# Patient Record
Sex: FEMALE | Race: WHITE | NOT HISPANIC OR LATINO
[De-identification: names, ages, dates, MRNs, and addresses within clinical notes are randomized per-mention and may not be internally consistent; named-entity substitution may affect disease eponyms.]

---

## 2018-02-27 ENCOUNTER — APPOINTMENT (OUTPATIENT)
Dept: MRI IMAGING | Facility: HOSPITAL | Age: 83
End: 2018-02-27
Payer: MEDICARE

## 2018-02-27 ENCOUNTER — OUTPATIENT (OUTPATIENT)
Dept: OUTPATIENT SERVICES | Facility: HOSPITAL | Age: 83
LOS: 1 days | End: 2018-02-27
Payer: MEDICARE

## 2018-02-27 DIAGNOSIS — Z00.8 ENCOUNTER FOR OTHER GENERAL EXAMINATION: ICD-10-CM

## 2018-02-27 DIAGNOSIS — Z98.89 OTHER SPECIFIED POSTPROCEDURAL STATES: Chronic | ICD-10-CM

## 2018-02-27 DIAGNOSIS — M24.842: ICD-10-CM

## 2018-02-27 PROCEDURE — 73218 MRI UPPER EXTREMITY W/O DYE: CPT | Mod: 26,LT

## 2018-02-27 PROCEDURE — 73218 MRI UPPER EXTREMITY W/O DYE: CPT

## 2018-03-08 ENCOUNTER — OUTPATIENT (OUTPATIENT)
Dept: OUTPATIENT SERVICES | Facility: HOSPITAL | Age: 83
LOS: 1 days | End: 2018-03-08
Payer: MEDICARE

## 2018-03-08 DIAGNOSIS — S66.822A LACERATION OF OTHER SPECIFIED MUSCLES, FASCIA AND TENDONS AT WRIST AND HAND LEVEL, LEFT HAND, INITIAL ENCOUNTER: ICD-10-CM

## 2018-03-08 DIAGNOSIS — Z01.818 ENCOUNTER FOR OTHER PREPROCEDURAL EXAMINATION: ICD-10-CM

## 2018-03-08 DIAGNOSIS — I10 ESSENTIAL (PRIMARY) HYPERTENSION: ICD-10-CM

## 2018-03-08 DIAGNOSIS — Z98.89 OTHER SPECIFIED POSTPROCEDURAL STATES: Chronic | ICD-10-CM

## 2018-03-08 PROCEDURE — 93010 ELECTROCARDIOGRAM REPORT: CPT | Mod: NC

## 2018-03-08 PROCEDURE — 85027 COMPLETE CBC AUTOMATED: CPT

## 2018-03-08 PROCEDURE — G0463: CPT

## 2018-03-08 PROCEDURE — 36415 COLL VENOUS BLD VENIPUNCTURE: CPT

## 2018-03-08 PROCEDURE — 93005 ELECTROCARDIOGRAM TRACING: CPT

## 2018-03-08 PROCEDURE — 80048 BASIC METABOLIC PNL TOTAL CA: CPT

## 2018-03-12 ENCOUNTER — APPOINTMENT (OUTPATIENT)
Dept: ORTHOPEDIC SURGERY | Facility: CLINIC | Age: 83
End: 2018-03-12

## 2018-03-13 ENCOUNTER — OUTPATIENT (OUTPATIENT)
Dept: OUTPATIENT SERVICES | Facility: HOSPITAL | Age: 83
LOS: 1 days | End: 2018-03-13
Payer: MEDICARE

## 2018-03-13 DIAGNOSIS — M17.0 BILATERAL PRIMARY OSTEOARTHRITIS OF KNEE: ICD-10-CM

## 2018-03-13 DIAGNOSIS — Z98.89 OTHER SPECIFIED POSTPROCEDURAL STATES: Chronic | ICD-10-CM

## 2018-03-13 DIAGNOSIS — H35.30 UNSPECIFIED MACULAR DEGENERATION: ICD-10-CM

## 2018-03-13 DIAGNOSIS — E03.9 HYPOTHYROIDISM, UNSPECIFIED: ICD-10-CM

## 2018-03-13 DIAGNOSIS — S66.222A LACERATION OF EXTENSOR MUSCLE, FASCIA AND TENDON OF LEFT THUMB AT WRIST AND HAND LEVEL, INITIAL ENCOUNTER: ICD-10-CM
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
## 2018-03-13 DIAGNOSIS — E78.00 PURE HYPERCHOLESTEROLEMIA, UNSPECIFIED: ICD-10-CM

## 2018-03-13 DIAGNOSIS — S66.822A LACERATION OF OTHER SPECIFIED MUSCLES, FASCIA AND TENDONS AT WRIST AND HAND LEVEL, LEFT HAND, INITIAL ENCOUNTER: ICD-10-CM

## 2018-03-13 DIAGNOSIS — I10 ESSENTIAL (PRIMARY) HYPERTENSION: ICD-10-CM
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
## 2018-03-14 ENCOUNTER — TRANSCRIPTION ENCOUNTER (OUTPATIENT)
Age: 83
End: 2018-03-14

## 2018-03-14 PROCEDURE — C1713: CPT

## 2018-03-14 PROCEDURE — 26418 REPAIR FINGER TENDON: CPT | Mod: FA

## 2018-08-16 ENCOUNTER — INPATIENT (INPATIENT)
Facility: HOSPITAL | Age: 83
LOS: 0 days | Discharge: HOME | DRG: 313 | End: 2018-08-17
Attending: INTERNAL MEDICINE | Admitting: INTERNAL MEDICINE
Payer: COMMERCIAL

## 2018-08-16 VITALS
RESPIRATION RATE: 18 BRPM | WEIGHT: 129.19 LBS | DIASTOLIC BLOOD PRESSURE: 79 MMHG | SYSTOLIC BLOOD PRESSURE: 139 MMHG | TEMPERATURE: 98 F | OXYGEN SATURATION: 94 % | HEART RATE: 70 BPM | HEIGHT: 63 IN

## 2018-08-16 DIAGNOSIS — R07.9 CHEST PAIN, UNSPECIFIED: ICD-10-CM

## 2018-08-16 DIAGNOSIS — Z98.89 OTHER SPECIFIED POSTPROCEDURAL STATES: Chronic | ICD-10-CM

## 2018-08-16 DIAGNOSIS — I10 ESSENTIAL (PRIMARY) HYPERTENSION: ICD-10-CM

## 2018-08-16 DIAGNOSIS — E78.5 HYPERLIPIDEMIA, UNSPECIFIED: ICD-10-CM

## 2018-08-16 DIAGNOSIS — Z29.9 ENCOUNTER FOR PROPHYLACTIC MEASURES, UNSPECIFIED: ICD-10-CM

## 2018-08-16 LAB
ALBUMIN SERPL ELPH-MCNC: 4.2 G/DL — SIGNIFICANT CHANGE UP (ref 3.3–5)
ALP SERPL-CCNC: 88 U/L — SIGNIFICANT CHANGE UP (ref 40–120)
ALT FLD-CCNC: 29 U/L DA — SIGNIFICANT CHANGE UP (ref 10–45)
ANION GAP SERPL CALC-SCNC: 11 MMOL/L — SIGNIFICANT CHANGE UP (ref 5–17)
AST SERPL-CCNC: 23 U/L — SIGNIFICANT CHANGE UP (ref 10–40)
BASOPHILS # BLD AUTO: 0 K/UL — SIGNIFICANT CHANGE UP (ref 0–0.2)
BASOPHILS NFR BLD AUTO: 0.6 % — SIGNIFICANT CHANGE UP (ref 0–2)
BILIRUB SERPL-MCNC: 0.9 MG/DL — SIGNIFICANT CHANGE UP (ref 0.2–1.2)
BUN SERPL-MCNC: 18 MG/DL — SIGNIFICANT CHANGE UP (ref 7–23)
CALCIUM SERPL-MCNC: 9.8 MG/DL — SIGNIFICANT CHANGE UP (ref 8.4–10.5)
CHLORIDE SERPL-SCNC: 105 MMOL/L — SIGNIFICANT CHANGE UP (ref 96–108)
CK SERPL-CCNC: 71 U/L — SIGNIFICANT CHANGE UP (ref 25–170)
CO2 SERPL-SCNC: 24 MMOL/L — SIGNIFICANT CHANGE UP (ref 22–31)
CREAT SERPL-MCNC: 0.72 MG/DL — SIGNIFICANT CHANGE UP (ref 0.5–1.3)
EOSINOPHIL # BLD AUTO: 0 K/UL — SIGNIFICANT CHANGE UP (ref 0–0.5)
EOSINOPHIL NFR BLD AUTO: 0.3 % — SIGNIFICANT CHANGE UP (ref 0–6)
GLUCOSE SERPL-MCNC: 104 MG/DL — HIGH (ref 70–99)
HCT VFR BLD CALC: 40 % — SIGNIFICANT CHANGE UP (ref 34.5–45)
HGB BLD-MCNC: 14.2 G/DL — SIGNIFICANT CHANGE UP (ref 11.5–15.5)
LYMPHOCYTES # BLD AUTO: 1.3 K/UL — SIGNIFICANT CHANGE UP (ref 1–3.3)
LYMPHOCYTES # BLD AUTO: 18.9 % — SIGNIFICANT CHANGE UP (ref 13–44)
MCHC RBC-ENTMCNC: 32.9 PG — SIGNIFICANT CHANGE UP (ref 27–34)
MCHC RBC-ENTMCNC: 35.6 GM/DL — SIGNIFICANT CHANGE UP (ref 32–36)
MCV RBC AUTO: 92.4 FL — SIGNIFICANT CHANGE UP (ref 80–100)
MONOCYTES # BLD AUTO: 0.4 K/UL — SIGNIFICANT CHANGE UP (ref 0–0.9)
MONOCYTES NFR BLD AUTO: 6 % — SIGNIFICANT CHANGE UP (ref 2–14)
NEUTROPHILS # BLD AUTO: 4.9 K/UL — SIGNIFICANT CHANGE UP (ref 1.8–7.4)
NEUTROPHILS NFR BLD AUTO: 74.1 % — SIGNIFICANT CHANGE UP (ref 43–77)
NT-PROBNP SERPL-SCNC: 287 PG/ML — SIGNIFICANT CHANGE UP (ref 0–300)
PLATELET # BLD AUTO: 203 K/UL — SIGNIFICANT CHANGE UP (ref 150–400)
POTASSIUM SERPL-MCNC: 3.8 MMOL/L — SIGNIFICANT CHANGE UP (ref 3.5–5.3)
POTASSIUM SERPL-SCNC: 3.8 MMOL/L — SIGNIFICANT CHANGE UP (ref 3.5–5.3)
PROT SERPL-MCNC: 7.5 G/DL — SIGNIFICANT CHANGE UP (ref 6–8.3)
RBC # BLD: 4.33 M/UL — SIGNIFICANT CHANGE UP (ref 3.8–5.2)
RBC # FLD: 11.7 % — SIGNIFICANT CHANGE UP (ref 10.3–14.5)
SODIUM SERPL-SCNC: 140 MMOL/L — SIGNIFICANT CHANGE UP (ref 135–145)
TROPONIN I SERPL-MCNC: <.017 NG/ML — LOW (ref 0.02–0.06)
WBC # BLD: 6.6 K/UL — SIGNIFICANT CHANGE UP (ref 3.8–10.5)
WBC # FLD AUTO: 6.6 K/UL — SIGNIFICANT CHANGE UP (ref 3.8–10.5)

## 2018-08-16 PROCEDURE — 71045 X-RAY EXAM CHEST 1 VIEW: CPT | Mod: 26

## 2018-08-16 PROCEDURE — 93010 ELECTROCARDIOGRAM REPORT: CPT

## 2018-08-16 PROCEDURE — 99222 1ST HOSP IP/OBS MODERATE 55: CPT

## 2018-08-16 PROCEDURE — 99285 EMERGENCY DEPT VISIT HI MDM: CPT

## 2018-08-16 PROCEDURE — 99223 1ST HOSP IP/OBS HIGH 75: CPT

## 2018-08-16 RX ORDER — LEVOTHYROXINE SODIUM 125 MCG
50 TABLET ORAL DAILY
Qty: 0 | Refills: 0 | Status: DISCONTINUED | OUTPATIENT
Start: 2018-08-16 | End: 2018-08-17

## 2018-08-16 RX ORDER — NITROGLYCERIN 6.5 MG
1 CAPSULE, EXTENDED RELEASE ORAL ONCE
Qty: 0 | Refills: 0 | Status: COMPLETED | OUTPATIENT
Start: 2018-08-16 | End: 2018-08-16

## 2018-08-16 RX ORDER — ATORVASTATIN CALCIUM 80 MG/1
20 TABLET, FILM COATED ORAL AT BEDTIME
Qty: 0 | Refills: 0 | Status: DISCONTINUED | OUTPATIENT
Start: 2018-08-16 | End: 2018-08-17

## 2018-08-16 RX ORDER — SODIUM CHLORIDE 9 MG/ML
3 INJECTION INTRAMUSCULAR; INTRAVENOUS; SUBCUTANEOUS ONCE
Qty: 0 | Refills: 0 | Status: COMPLETED | OUTPATIENT
Start: 2018-08-16 | End: 2018-08-16

## 2018-08-16 RX ORDER — REGADENOSON 0.08 MG/ML
0.4 INJECTION, SOLUTION INTRAVENOUS ONCE
Qty: 0 | Refills: 0 | Status: COMPLETED | OUTPATIENT
Start: 2018-08-16 | End: 2018-08-17

## 2018-08-16 RX ORDER — TELMISARTAN 20 MG/1
1 TABLET ORAL
Qty: 0 | Refills: 0 | COMMUNITY

## 2018-08-16 RX ORDER — SIMVASTATIN 20 MG/1
1 TABLET, FILM COATED ORAL
Qty: 0 | Refills: 0 | COMMUNITY

## 2018-08-16 RX ORDER — ASPIRIN/CALCIUM CARB/MAGNESIUM 324 MG
81 TABLET ORAL DAILY
Qty: 0 | Refills: 0 | Status: DISCONTINUED | OUTPATIENT
Start: 2018-08-16 | End: 2018-08-17

## 2018-08-16 RX ORDER — LEVOTHYROXINE SODIUM 125 MCG
40 TABLET ORAL
Qty: 0 | Refills: 0 | COMMUNITY

## 2018-08-16 RX ORDER — LOSARTAN POTASSIUM 100 MG/1
50 TABLET, FILM COATED ORAL DAILY
Qty: 0 | Refills: 0 | Status: DISCONTINUED | OUTPATIENT
Start: 2018-08-16 | End: 2018-08-17

## 2018-08-16 RX ORDER — ASPIRIN/CALCIUM CARB/MAGNESIUM 324 MG
325 TABLET ORAL ONCE
Qty: 0 | Refills: 0 | Status: COMPLETED | OUTPATIENT
Start: 2018-08-16 | End: 2018-08-16

## 2018-08-16 RX ADMIN — LOSARTAN POTASSIUM 50 MILLIGRAM(S): 100 TABLET, FILM COATED ORAL at 21:56

## 2018-08-16 RX ADMIN — Medication 1 INCH(S): at 12:27

## 2018-08-16 RX ADMIN — ATORVASTATIN CALCIUM 20 MILLIGRAM(S): 80 TABLET, FILM COATED ORAL at 21:55

## 2018-08-16 RX ADMIN — SODIUM CHLORIDE 3 MILLILITER(S): 9 INJECTION INTRAMUSCULAR; INTRAVENOUS; SUBCUTANEOUS at 11:50

## 2018-08-16 NOTE — ED ADULT NURSE NOTE - CHPI ED NUR SYMPTOMS NEG
no congestion/no chills/no nausea/no shortness of breath/no dizziness/no fever/no syncope/no vomiting/no diaphoresis/no back pain

## 2018-08-16 NOTE — ED ADULT NURSE REASSESSMENT NOTE - NS ED NURSE REASSESS COMMENT FT1
Pt placed in hospital bed, trop sent to lab, pending bed assignment. Pt made aware of plan of care. denies any concerns at this time, pt made comfortable, will continue to monitor.

## 2018-08-16 NOTE — ED PROVIDER NOTE - PMH
Acromegaly    Dyslipidemia    Hypertension    Parotid mass  left. s/p biopsy ("inconclusive) instructed to have repeat scan done in March 2015.being monitored by Dr. Sung Oscar (ENT)  Pituitary macroadenoma Attending Only

## 2018-08-16 NOTE — ED ADULT NURSE NOTE - OBJECTIVE STATEMENT
The patient is a 84y Female complaining of chest discomfort since yesterday. Pt stated it is not pain just discomfort. C/o nausea Denies any vomiting, HA, dizziness, fever, or any other concerns at this time.

## 2018-08-16 NOTE — H&P ADULT - NSHPREVIEWOFSYSTEMS_GEN_ALL_CORE
REVIEW OF SYSTEMS:  CONSTITUTIONAL: No fever, weight loss, or fatigue  RESPIRATORY: No cough, wheezing, chills or hemoptysis; No shortness of breath  CARDIOVASCULAR: + chest pain, No palpitations, + dizziness  GASTROINTESTINAL: +abdominal pain, +nausea, No vomiting, or hematemesis; No diarrhea or constipation  GENITOURINARY: No dysuria, frequency, hematuria, or incontinence  NEUROLOGICAL: No headaches, memory loss, loss of strength, numbness, or tremors  SKIN: No itching, burning, rashes, or lesions   MUSCULOSKELETAL: No joint pain or swelling; No muscle, back, or extremity pain          All other ROS reviewed and negative except as otherwise stated

## 2018-08-16 NOTE — CONSULT NOTE ADULT - ASSESSMENT
In summary the pt is an elderly woman with 2 known risk factors for cad with atypical prolonged chest pain    suggest:  serial troponin  serial ekg    if no further symptoms vmpi in am

## 2018-08-16 NOTE — H&P ADULT - PROBLEM SELECTOR PLAN 4
IMPROVE VTE Individual Risk Assessment    RISK                                                                Points    [  ] Previous VTE                                                  3    [  ] Thrombophilia                                               2    [  ] Lower limb paralysis                                      2        (unable to hold up >15 seconds)      [  ] Current Cancer                                              2         (within 6 months)  [  ] Immobilization   [  ] ICU/CCU stay > 24 hours                              1  [X  ] Age > 60                                                      1  IMPROVE VTE Score _________

## 2018-08-16 NOTE — H&P ADULT - NSHPLABSRESULTS_GEN_ALL_CORE
14.2   6.6   )-----------( 203      ( 16 Aug 2018 11:55 )             40.0       08-16    140  |  105  |  18  ----------------------------<  104<H>  3.8   |  24  |  0.72    Ca    9.8      16 Aug 2018 11:55    TPro  7.5  /  Alb  4.2  /  TBili  0.9  /  DBili  x   /  AST  23  /  ALT  29  /  AlkPhos  88  08-16          CARDIAC MARKERS ( 16 Aug 2018 11:55 )  <.017 ng/mL / x     / 71 U/L / x     / x          < from: Xray Chest 1 View AP/PA (08.16.18 @ 12:06) >    IMPRESSION: No acute lung finding. Other findings stable as above.    < end of copied text >    EKG- reviewed nsr

## 2018-08-16 NOTE — ED PROVIDER NOTE - NS ED ROS FT
Constitutional: (-) fever  (-)chills  (-)sweats  Eyes/ENT: (-) blurry vision, (-) epistaxis  (-)rhinorrhea   (-) sore throat    Cardiovascular: (+) chest pain, (-) palpitations (-) edema   Respiratory: (-) cough, (-) shortness of breath   Gastrointestinal: (-)nausea  (-)vomiting, (-) diarrhea  (-) abdominal pain   :  (-)dysuria, (-)frequency, (-)urgency, (-)hematuria  Musculoskeletal: (-) neck pain, (-) back pain, (-) joint pain  Integumentary: (-) rash, (-) edema  Neurological: (-) headache, (-) altered mental status  (-)LOC + dizziness

## 2018-08-16 NOTE — H&P ADULT - HISTORY OF PRESENT ILLNESS
85 y/o F with PMG hypothyroidism, HTN, HLD presents with chest pain associated with nausea and diaphoresis. Patient states it started yesterday while she was relaxing at home and the pain went across her chest and she felt nauseous and had abdominal pain along with it. Denies any SOB, GI symptoms, headache, fever, HA, chills, etc.

## 2018-08-16 NOTE — ED ADULT NURSE NOTE - NSIMPLEMENTINTERV_GEN_ALL_ED
Implemented All Universal Safety Interventions:  [place] to call system. Call bell, personal items and telephone within reach. Instruct patient to call for assistance. Room bathroom lighting operational. Non-slip footwear when patient is off stretcher. Physically safe environment: no spills, clutter or unnecessary equipment. Stretcher in lowest position, wheels locked, appropriate side rails in place.

## 2018-08-16 NOTE — ED ADULT NURSE NOTE - PMH
Acromegaly    Dyslipidemia    Hypertension    Parotid mass  left. s/p biopsy ("inconclusive) instructed to have repeat scan done in March 2015.being monitored by Dr. Sung Oscar (ENT)  Pituitary macroadenoma

## 2018-08-16 NOTE — H&P ADULT - FAMILY HISTORY
Sibling  Still living? Unknown  Family history of colon cancer, Age at diagnosis: Age Unknown  Family history of brain tumor, Age at diagnosis: Age Unknown  Family history of lung cancer, Age at diagnosis: Age Unknown

## 2018-08-16 NOTE — CONSULT NOTE ADULT - SUBJECTIVE AND OBJECTIVE BOX
This is an 84 yr old white woman with hx of htn and hyperlipidemia with sevral hours of squeezing left sided chest pain radiating to abdomen. Pain occurred yesterday but patient could not get anyone to drive her" until today. She had some assoc sob. No n/v but did have diaphoresis      PMH:   Acromegaly  Pituitary macroadenoma  Parotid mass  Dyslipidemia  Hypertension    PSH:   History of cervical discectomy    Family History:  FAMILY HISTORY:  Family history of lung cancer (Sibling)  Family history of brain tumor (Sibling)  Family history of colon cancer (Sibling)      Social History:  Smoking:NO  Alcohol:OCC  Drugs:NO    Allergies:  No Known Allergies      Medications:  aspirin  chewable 81 milliGRAM(s) Oral daily  atorvastatin 20 milliGRAM(s) Oral at bedtime  levothyroxine 50 MICROGram(s) Oral daily  losartan 50 milliGRAM(s) Oral daily      REVIEW OF SYSTEMS:  CONSTITUTIONAL: No fever, weight loss, or fatigue  EYES: No eye pain, visual disturbances, or discharge  ENMT:  No difficulty hearing, tinnitus, vertigo; No sinus or throat pain  NECK: No pain or stiffness  BREASTS: No pain, masses, or nipple discharge  RESPIRATORY: No cough, wheezing, chills or hemoptysis; No shortness of breath  CARDIOVASCULAR: as per hpi  GASTROINTESTINAL:as per hpi  GENITOURINARY: No dysuria, frequency, hematuria, or incontinence  NEUROLOGICAL: No headaches, memory loss, loss of strength, numbness, or tremors  SKIN: No itching, burning, rashes, or lesions   LYMPH NODES: No enlarged glands  ENDOCRINE: No heat or cold intolerance; No hair loss  MUSCULOSKELETAL: No joint pain or swelling; No muscle, back, or extremity pain  PSYCHIATRIC: No depression, anxiety, mood swings, or difficulty sleeping  HEME/LYMPH: No easy bruising, or bleeding gums  ALLERY AND IMMUNOLOGIC: No hives or eczema    Physical Exam:  T(C): 36.8 (08-16-18 @ 11:31), Max: 36.8 (08-16-18 @ 11:31)  HR: 59 (08-16-18 @ 16:05) (59 - 70)  BP: 150/73 (08-16-18 @ 16:05) (117/61 - 150/73)  RR: 17 (08-16-18 @ 16:05) (17 - 18)  SpO2: 100% (08-16-18 @ 16:05) (94% - 100%)  Wt(kg): --    GENERAL: NAD, well-groomed, well-developed  HEAD:  Atraumatic, Normocephalic  EYES: EOMI, conjunctiva and sclera clear  ENT: Moist mucous membranes,  NECK: Supple, No JVD, no bruits  CHEST/LUNG: Clear to percussion bilaterally; No rales, rhonchi, wheezing, or rubs  HEART: Regular rate and rhythm; No murmurs, rubs, or gallops PMI non displaced.  ABDOMEN: Soft, Nontender, Nondistended; Bowel sounds present  EXTREMITIES:  2+ Peripheral Pulses, No clubbing, cyanosis, or edema  SKIN: No rashes or lesions  NERVOUS SYSTEM:  Alert & Oriented X3, Good concentration; Motor Strength 5/5 B/L upper and lower extremities; DTRs 2+ intact and symmetric    Cardiovascular Diagnostic Testing:  ECG:NORMAL    Labs:                        14.2   6.6   )-----------( 203      ( 16 Aug 2018 11:55 )             40.0     08-16    140  |  105  |  18  ----------------------------<  104<H>  3.8   |  24  |  0.72    Ca    9.8      16 Aug 2018 11:55    TPro  7.5  /  Alb  4.2  /  TBili  0.9  /  DBili  x   /  AST  23  /  ALT  29  /  AlkPhos  88  08-16      CARDIAC MARKERS ( 16 Aug 2018 11:55 )  <.017 ng/mL / x     / 71 U/L / x     / x          Serum Pro-Brain Natriuretic Peptide: 287 pg/mL (08-16 @ 11:55)            Imaging:cxr normal

## 2018-08-17 ENCOUNTER — TRANSCRIPTION ENCOUNTER (OUTPATIENT)
Age: 83
End: 2018-08-17

## 2018-08-17 VITALS
TEMPERATURE: 98 F | DIASTOLIC BLOOD PRESSURE: 67 MMHG | RESPIRATION RATE: 16 BRPM | HEART RATE: 56 BPM | OXYGEN SATURATION: 98 % | SYSTOLIC BLOOD PRESSURE: 116 MMHG

## 2018-08-17 LAB
ANION GAP SERPL CALC-SCNC: 8 MMOL/L — SIGNIFICANT CHANGE UP (ref 5–17)
BUN SERPL-MCNC: 20 MG/DL — SIGNIFICANT CHANGE UP (ref 7–23)
CALCIUM SERPL-MCNC: 8.9 MG/DL — SIGNIFICANT CHANGE UP (ref 8.4–10.5)
CHLORIDE SERPL-SCNC: 109 MMOL/L — HIGH (ref 96–108)
CHOLEST SERPL-MCNC: 130 MG/DL — SIGNIFICANT CHANGE UP (ref 10–199)
CO2 SERPL-SCNC: 26 MMOL/L — SIGNIFICANT CHANGE UP (ref 22–31)
CREAT SERPL-MCNC: 0.69 MG/DL — SIGNIFICANT CHANGE UP (ref 0.5–1.3)
GLUCOSE SERPL-MCNC: 89 MG/DL — SIGNIFICANT CHANGE UP (ref 70–99)
HCT VFR BLD CALC: 36.1 % — SIGNIFICANT CHANGE UP (ref 34.5–45)
HDLC SERPL-MCNC: 54 MG/DL — SIGNIFICANT CHANGE UP
HGB BLD-MCNC: 12.5 G/DL — SIGNIFICANT CHANGE UP (ref 11.5–15.5)
LIPID PNL WITH DIRECT LDL SERPL: 61 MG/DL — SIGNIFICANT CHANGE UP
MCHC RBC-ENTMCNC: 32.5 PG — SIGNIFICANT CHANGE UP (ref 27–34)
MCHC RBC-ENTMCNC: 34.6 GM/DL — SIGNIFICANT CHANGE UP (ref 32–36)
MCV RBC AUTO: 93.9 FL — SIGNIFICANT CHANGE UP (ref 80–100)
PLATELET # BLD AUTO: 176 K/UL — SIGNIFICANT CHANGE UP (ref 150–400)
POTASSIUM SERPL-MCNC: 3.5 MMOL/L — SIGNIFICANT CHANGE UP (ref 3.5–5.3)
POTASSIUM SERPL-SCNC: 3.5 MMOL/L — SIGNIFICANT CHANGE UP (ref 3.5–5.3)
RBC # BLD: 3.84 M/UL — SIGNIFICANT CHANGE UP (ref 3.8–5.2)
RBC # FLD: 12 % — SIGNIFICANT CHANGE UP (ref 10.3–14.5)
SODIUM SERPL-SCNC: 143 MMOL/L — SIGNIFICANT CHANGE UP (ref 135–145)
TOTAL CHOLESTEROL/HDL RATIO MEASUREMENT: 2.4 RATIO — LOW (ref 3.3–7.1)
TRIGL SERPL-MCNC: 77 MG/DL — SIGNIFICANT CHANGE UP (ref 10–149)
WBC # BLD: 5.7 K/UL — SIGNIFICANT CHANGE UP (ref 3.8–10.5)
WBC # FLD AUTO: 5.7 K/UL — SIGNIFICANT CHANGE UP (ref 3.8–10.5)

## 2018-08-17 PROCEDURE — 82550 ASSAY OF CK (CPK): CPT

## 2018-08-17 PROCEDURE — 84484 ASSAY OF TROPONIN QUANT: CPT

## 2018-08-17 PROCEDURE — 80053 COMPREHEN METABOLIC PANEL: CPT

## 2018-08-17 PROCEDURE — 85027 COMPLETE CBC AUTOMATED: CPT

## 2018-08-17 PROCEDURE — 99285 EMERGENCY DEPT VISIT HI MDM: CPT | Mod: 25

## 2018-08-17 PROCEDURE — 93018 CV STRESS TEST I&R ONLY: CPT

## 2018-08-17 PROCEDURE — 80061 LIPID PANEL: CPT

## 2018-08-17 PROCEDURE — A9500: CPT

## 2018-08-17 PROCEDURE — 78452 HT MUSCLE IMAGE SPECT MULT: CPT | Mod: 26

## 2018-08-17 PROCEDURE — 93016 CV STRESS TEST SUPVJ ONLY: CPT

## 2018-08-17 PROCEDURE — 78452 HT MUSCLE IMAGE SPECT MULT: CPT

## 2018-08-17 PROCEDURE — 80048 BASIC METABOLIC PNL TOTAL CA: CPT

## 2018-08-17 PROCEDURE — 71045 X-RAY EXAM CHEST 1 VIEW: CPT

## 2018-08-17 PROCEDURE — 99232 SBSQ HOSP IP/OBS MODERATE 35: CPT | Mod: 25

## 2018-08-17 PROCEDURE — 93306 TTE W/DOPPLER COMPLETE: CPT | Mod: 26

## 2018-08-17 PROCEDURE — 93306 TTE W/DOPPLER COMPLETE: CPT

## 2018-08-17 PROCEDURE — 93005 ELECTROCARDIOGRAM TRACING: CPT

## 2018-08-17 PROCEDURE — 83880 ASSAY OF NATRIURETIC PEPTIDE: CPT

## 2018-08-17 RX ADMIN — Medication 1 INCH(S): at 00:05

## 2018-08-17 RX ADMIN — Medication 50 MICROGRAM(S): at 05:14

## 2018-08-17 RX ADMIN — Medication 81 MILLIGRAM(S): at 12:03

## 2018-08-17 RX ADMIN — LOSARTAN POTASSIUM 50 MILLIGRAM(S): 100 TABLET, FILM COATED ORAL at 05:14

## 2018-08-17 RX ADMIN — REGADENOSON 0.4 MILLIGRAM(S): 0.08 INJECTION, SOLUTION INTRAVENOUS at 09:55

## 2018-08-17 NOTE — PROGRESS NOTE ADULT - SUBJECTIVE AND OBJECTIVE BOX
Follow up for   chest pain    SUBJ:   No chest pain, dyspnea, palpitations    PMH  Acromegaly  Pituitary macroadenoma  Parotid mass  Dyslipidemia  Hypertension      MEDICATIONS  (STANDING):  aspirin  chewable 81 milliGRAM(s) Oral daily  atorvastatin 20 milliGRAM(s) Oral at bedtime  levothyroxine 50 MICROGram(s) Oral daily  losartan 50 milliGRAM(s) Oral daily  regadenoson Injectable 0.4 milliGRAM(s) IV Push once    MEDICATIONS  (PRN):        PHYSICAL EXAM:  Vital Signs Last 24 Hrs  T(C): 36.3 (17 Aug 2018 06:41), Max: 36.8 (16 Aug 2018 11:31)  T(F): 97.4 (17 Aug 2018 06:41), Max: 98.3 (16 Aug 2018 11:31)  HR: 64 (17 Aug 2018 06:41) (52 - 70)  BP: 126/72 (17 Aug 2018 06:41) (117/61 - 150/73)  BP(mean): --  RR: 16 (17 Aug 2018 06:41) (16 - 18)  SpO2: 99% (16 Aug 2018 21:18) (94% - 100%)    GENERAL: NAD, well-groomed, well-developed  HEAD:  Atraumatic, Normocephalic  EYES: EOMI, PERRLA, conjunctiva and sclera clear  ENT: Moist mucous membranes,  NECK: Supple, No JVD, no bruits  CHEST/LUNG: Clear to percussion and auscultation bilaterally; No rales, rhonchi, wheezing, or rubs  HEART: Regular rate and rhythm; No murmurs, rubs, or gallops PMI non displaced.  ABDOMEN: Soft, Nontender, Nondistended; Bowel sounds present  EXTREMITIES:   No clubbing, cyanosis, or edema  SKIN: No rashes or lesions  NERVOUS SYSTEM:  Alert & Oriented      TELEMETRY:   sinus bradycardia    ECG: < from: 12 Lead ECG (08.16.18 @ 11:42) >    Ventricular Rate 67 BPM    Atrial Rate 67 BPM    P-R Interval 158 ms    QRS Duration 90 ms    Q-T Interval 416 ms    QTC Calculation(Bezet) 439 ms    P Axis 69 degrees    R Axis -45 degrees    T Axis 44 degrees    Diagnosis Line Normal sinus rhythm  Left anterior fascicular block  borderline st t appearance  Abnormal ECG  When compared with ECG of 08-MAR-2018 11:11,  No significant change was found  Confirmed by YASMANY ORLANDO, MITESH MACIAS ([id number]) on 8/17/2018 8:13:36 AM    < end of copied text >      LABS:                        12.5   5.7   )-----------( 176      ( 17 Aug 2018 05:50 )             36.1     08-17    143  |  109<H>  |  20  ----------------------------<  89  3.5   |  26  |  0.69    Ca    8.9      17 Aug 2018 05:50    TPro  7.5  /  Alb  4.2  /  TBili  0.9  /  DBili  x   /  AST  23  /  ALT  29  /  AlkPhos  88  08-16    CARDIAC MARKERS ( 16 Aug 2018 22:10 )  <.017 ng/mL / x     / x     / x     / x      CARDIAC MARKERS ( 16 Aug 2018 18:35 )  <.017 ng/mL / x     / x     / x     / x      CARDIAC MARKERS ( 16 Aug 2018 11:55 )  <.017 ng/mL / x     / 71 U/L / x     / x              I&O's Summary    BNPSerum Pro-Brain Natriuretic Peptide: 287 pg/mL (08-16 @ 11:55)      RADIOLOGY & ADDITIONAL STUDIES:    < from: Xray Chest 1 View AP/PA (08.16.18 @ 12:06) >    EXAM:  XR CHEST AP OR PA 1V      PROCEDURE DATE:  08/16/2018        INTERPRETATION:  AP erect chest on August 16, 2018 at 11:54 AM. Patient   has chest pain since yesterday.    Heart is magnified by technique but could be enlarged.    There is mildapical scarring which does not suggest activity.    There is no sense of active lung or pleural finding.    The chest is similar to February 21, 2015.    IMPRESSION: No acute lung finding. Other findings stable as above.    < end of copied text >

## 2018-08-17 NOTE — DISCHARGE NOTE ADULT - MEDICATION SUMMARY - MEDICATIONS TO TAKE
I will START or STAY ON the medications listed below when I get home from the hospital:    losartan 50 mg oral tablet  -- 1 tab(s) by mouth once a day  -- Indication: For HTN    atorvastatin 20 mg oral tablet  -- 1 tab(s) by mouth once a day  -- Indication: For HLD    Synthroid 50 mcg (0.05 mg) oral tablet  -- 1 tab(s) by mouth once a day  -- Indication: For Hypothyroidism

## 2018-08-17 NOTE — DISCHARGE NOTE ADULT - CARE PROVIDER_API CALL
Katie Cabrera), Family Medicine  [address] [place] [address]  Phone: [phone]  Fax: [phone]

## 2018-08-17 NOTE — DISCHARGE NOTE ADULT - HOSPITAL COURSE
83 y/o female with PMH of HTN, HLD, Hypothyroidism, p/w chest pain. Cardiac enzymes negative x 3, nuclear stress test normal. pt. was cleared for discharge by cardiology - Dr. Rodrigues. Pt. stable for discharge, will follow up with PMD - Dr. Cabrera,

## 2018-08-17 NOTE — DISCHARGE NOTE ADULT - CARE PLAN
Principal Discharge DX:	Chest pain, unspecified type  Goal:	prevent cardiac ischemia  Assessment and plan of treatment:	Atypical chest pain suspect 2/2 musculoskeletal  trops negative x 3  Nculear stress test normal  Cleared for discharge by cardiology - Ravi Shah  Secondary Diagnosis:	Dyslipidemia  Goal:	maintain HDL - wnl  Assessment and plan of treatment:	Cont. statin  Secondary Diagnosis:	Essential hypertension  Goal:	Maintain BP within normal range  Assessment and plan of treatment:	Controlled on current regimen  Secondary Diagnosis:	Acquired hypothyroidism  Goal:	Stable  Assessment and plan of treatment:	Cont. synthroid

## 2018-08-17 NOTE — PROGRESS NOTE ADULT - SUBJECTIVE AND OBJECTIVE BOX
Patient is a 84y old  Female who presents with a chief complaint of Chest pain (16 Aug 2018 14:47)  Patient feeling ok. No further discomfort since Wed. Had felt the chest discomfort, nausea and almost faint on Wed that lasted a while. Had started after breakfast of tea and english muffin. Had no diarrhea or sweating. No radiation. Head did feel heavy    INTERVAL HPI/OVERNIGHT EVENTS:    HEALTH ISSUES - PROBLEM Dx:  Prophylactic measure: Prophylactic measure  Dyslipidemia: Dyslipidemia  Essential hypertension: Essential hypertension  Chest pain due to CAD: Chest pain due to CAD    FAMILY HISTORY:  Family history of lung cancer (Sibling)  Family history of brain tumor (Sibling)  Family history of colon cancer (Sibling)    Vital Signs Last 24 Hrs  T(C): 36.3 (17 Aug 2018 06:41), Max: 36.8 (16 Aug 2018 11:31)  T(F): 97.4 (17 Aug 2018 06:41), Max: 98.3 (16 Aug 2018 11:31)  HR: 64 (17 Aug 2018 06:41) (52 - 70)  BP: 126/72 (17 Aug 2018 06:41) (117/61 - 150/73)  BP(mean): --  RR: 16 (17 Aug 2018 06:41) (16 - 18)  SpO2: 99% (16 Aug 2018 21:18) (94% - 100%)  No Known Allergies    MEDICATIONS  (STANDING):  aspirin  chewable 81 milliGRAM(s) Oral daily  atorvastatin 20 milliGRAM(s) Oral at bedtime  levothyroxine 50 MICROGram(s) Oral daily  losartan 50 milliGRAM(s) Oral daily  regadenoson Injectable 0.4 milliGRAM(s) IV Push once    MEDICATIONS  (PRN):      PHYSICAL EXAM:  GENERAL: NAD, well-groomed, well-developed  HEAD:  Atraumatic, Normocephalic  NECK: Supple, No JVD, Normal thyroid  NERVOUS SYSTEM:  Alert & Oriented X3, Good concentration  CHEST/LUNG: Clear to percussion bilaterally; No rales, rhonchi, wheezing, or rubs  HEART: Regular rate and rhythm; No murmurs, rubs, or gallops  ABDOMEN: Soft, Nontender, Nondistended; Bowel sounds present  EXTREMITIES:  2+ Peripheral Pulses, No clubbing, cyanosis, or edema  LYMPH: No lymphadenopathy noted  SKIN: No rashes or lesions    Consultant(s) Notes Reviewed:  [x ] YES  [ ] NO  Care Discussed with Consultants/Other Providers [ x] YES  [ ] NO    LABS:                        12.5   5.7   )-----------( 176      ( 17 Aug 2018 05:50 )             36.1     08-17    143  |  109<H>  |  20  ----------------------------<  89  3.5   |  26  |  0.69    Ca    8.9      17 Aug 2018 05:50    TPro  7.5  /  Alb  4.2  /  TBili  0.9  /  DBili  x   /  AST  23  /  ALT  29  /  AlkPhos  88  08-16              RADIOLOGY & ADDITIONAL TESTS:    Imaging Personally Reviewed:  [ ] YES  [ ] NO

## 2018-08-17 NOTE — PROGRESS NOTE ADULT - ASSESSMENT
Chest pain, resolved, negative markers. Having nuclear stress today.  Echo pending  Discussed with patient and Dr. Cabrera.

## 2018-08-17 NOTE — DISCHARGE NOTE ADULT - PLAN OF CARE
Cont. synthroid prevent cardiac ischemia Atypical chest pain suspect 2/2 musculoskeletal  trops negative x 3  Nculear stress test normal  Cleared for discharge by cardiology - Ravi Shah maintain HDL - wnl Cont. statin Maintain BP within normal range Controlled on current regimen Stable

## 2018-08-17 NOTE — DISCHARGE NOTE ADULT - PATIENT PORTAL LINK FT
You can access the [facility] Patient Portal, offered by [facility], by registering with the following website: [email or web address]

## 2018-08-17 NOTE — DISCHARGE NOTE ADULT - CARE PROVIDERS DIRECT ADDRESSES
[email or web address]

## 2018-08-17 NOTE — PROGRESS NOTE ADULT - ASSESSMENT
chest pain - in process of nuclear stress test. Await results. Likely noncardiac.   hyperlipidemia - lipitor  hypothyroidism - synthroid  HTN - losartan.   If stress neg, will discharge later.

## 2019-01-22 ENCOUNTER — OUTPATIENT (OUTPATIENT)
Dept: OUTPATIENT SERVICES | Facility: HOSPITAL | Age: 84
LOS: 1 days | End: 2019-01-22
Payer: MEDICARE

## 2019-01-22 ENCOUNTER — APPOINTMENT (OUTPATIENT)
Dept: MRI IMAGING | Facility: HOSPITAL | Age: 84
End: 2019-01-22
Payer: MEDICARE

## 2019-01-22 DIAGNOSIS — Z98.89 OTHER SPECIFIED POSTPROCEDURAL STATES: Chronic | ICD-10-CM

## 2019-01-22 DIAGNOSIS — Z00.8 ENCOUNTER FOR OTHER GENERAL EXAMINATION: ICD-10-CM

## 2019-01-22 PROCEDURE — 70551 MRI BRAIN STEM W/O DYE: CPT | Mod: 26

## 2019-01-22 PROCEDURE — 70551 MRI BRAIN STEM W/O DYE: CPT

## 2019-03-07 ENCOUNTER — OUTPATIENT (OUTPATIENT)
Dept: OUTPATIENT SERVICES | Facility: HOSPITAL | Age: 84
LOS: 1 days | End: 2019-03-07
Payer: MEDICARE

## 2019-03-07 ENCOUNTER — APPOINTMENT (OUTPATIENT)
Dept: ULTRASOUND IMAGING | Facility: HOSPITAL | Age: 84
End: 2019-03-07
Payer: MEDICARE

## 2019-03-07 DIAGNOSIS — Z98.89 OTHER SPECIFIED POSTPROCEDURAL STATES: Chronic | ICD-10-CM

## 2019-03-07 DIAGNOSIS — Z00.8 ENCOUNTER FOR OTHER GENERAL EXAMINATION: ICD-10-CM

## 2019-03-07 PROCEDURE — 76700 US EXAM ABDOM COMPLETE: CPT

## 2019-03-07 PROCEDURE — 76700 US EXAM ABDOM COMPLETE: CPT | Mod: 26

## 2019-03-22 ENCOUNTER — APPOINTMENT (OUTPATIENT)
Dept: SURGERY | Facility: CLINIC | Age: 84
End: 2019-03-22
Payer: MEDICARE

## 2019-03-22 VITALS — BODY MASS INDEX: 23.21 KG/M2 | HEIGHT: 63 IN | WEIGHT: 131 LBS

## 2019-03-22 PROCEDURE — 99203 OFFICE O/P NEW LOW 30 MIN: CPT

## 2019-03-23 NOTE — HISTORY OF PRESENT ILLNESS
[de-identified] : This 84 year old woman experienced one week of nausea one week ago. She denied abdominal pain, fever or chills, nor previous similar problems. Ultrasonography revelaed a 4.5 cm., mobile gallstone without any evidence of cholecystitis. Presently, the patient is asymptomatic regarding her GI tract.

## 2019-03-23 NOTE — REVIEW OF SYSTEMS
[Heart Rate Is Slow] : the heart rate was not slow [Chest Pain] : no chest pain [Shortness Of Breath] : no shortness of breath [Abdominal Pain] : no abdominal pain [Vomiting] : no vomiting [Constipation] : no constipation [Negative] : Eyes

## 2019-03-23 NOTE — PHYSICAL EXAM
[Normal Breath Sounds] : Normal breath sounds [Normal Heart Sounds] : normal heart sounds [Normal Rate and Rhythm] : normal rate and rhythm [Abdominal Masses] : No abdominal masses [Abdomen Tenderness] : ~T ~M No abdominal tenderness [No Rash or Lesion] : No rash or lesion [de-identified] : nl [de-identified] : nl [de-identified] : nl

## 2019-03-23 NOTE — ASSESSMENT
[FreeTextEntry1] : Long discussion regarding all options and risks\par Advised to adhere to a low fat diet\par To return in one month\par No surgery recommended at this time.

## 2020-02-04 ENCOUNTER — EMERGENCY (EMERGENCY)
Facility: HOSPITAL | Age: 85
LOS: 1 days | Discharge: HOME | End: 2020-02-04
Attending: INTERNAL MEDICINE | Admitting: INTERNAL MEDICINE
Payer: MEDICARE

## 2020-02-04 VITALS
TEMPERATURE: 98 F | WEIGHT: 134.04 LBS | SYSTOLIC BLOOD PRESSURE: 141 MMHG | DIASTOLIC BLOOD PRESSURE: 75 MMHG | OXYGEN SATURATION: 99 % | HEIGHT: 63 IN | HEART RATE: 57 BPM | RESPIRATION RATE: 19 BRPM

## 2020-02-04 DIAGNOSIS — Z98.89 OTHER SPECIFIED POSTPROCEDURAL STATES: Chronic | ICD-10-CM

## 2020-02-04 DIAGNOSIS — M54.2 CERVICALGIA: ICD-10-CM

## 2020-02-04 PROCEDURE — 72040 X-RAY EXAM NECK SPINE 2-3 VW: CPT | Mod: 26

## 2020-02-04 PROCEDURE — 72110 X-RAY EXAM L-2 SPINE 4/>VWS: CPT

## 2020-02-04 PROCEDURE — 99284 EMERGENCY DEPT VISIT MOD MDM: CPT

## 2020-02-04 PROCEDURE — 73562 X-RAY EXAM OF KNEE 3: CPT | Mod: 26,LT

## 2020-02-04 PROCEDURE — 73562 X-RAY EXAM OF KNEE 3: CPT

## 2020-02-04 PROCEDURE — 73030 X-RAY EXAM OF SHOULDER: CPT | Mod: 26,LT

## 2020-02-04 PROCEDURE — 73030 X-RAY EXAM OF SHOULDER: CPT

## 2020-02-04 PROCEDURE — 72040 X-RAY EXAM NECK SPINE 2-3 VW: CPT

## 2020-02-04 PROCEDURE — 72110 X-RAY EXAM L-2 SPINE 4/>VWS: CPT | Mod: 26

## 2020-02-04 RX ORDER — ACETAMINOPHEN 500 MG
975 TABLET ORAL ONCE
Refills: 0 | Status: COMPLETED | OUTPATIENT
Start: 2020-02-04 | End: 2020-02-04

## 2020-02-04 RX ADMIN — Medication 975 MILLIGRAM(S): at 13:48

## 2020-02-04 RX ADMIN — Medication 975 MILLIGRAM(S): at 14:05

## 2020-02-04 NOTE — ED ADULT NURSE NOTE - NSIMPLEMENTINTERV_GEN_ALL_ED
Implemented All Fall Risk Interventions:  [place] to call system. Call bell, personal items and telephone within reach. Instruct patient to call for assistance. Room bathroom lighting operational. Non-slip footwear when patient is off stretcher. Physically safe environment: no spills, clutter or unnecessary equipment. Stretcher in lowest position, wheels locked, appropriate side rails in place. Provide visual cue, wrist band, yellow gown, etc. Monitor gait and stability. Monitor for mental status changes and reorient to person, place, and time. Review medications for side effects contributing to fall risk. Reinforce activity limits and safety measures with patient and family.

## 2020-02-04 NOTE — ED PROVIDER NOTE - CARE PLAN
Principal Discharge DX:	Accidental fall, initial encounter  Secondary Diagnosis:	Lumbar strain, initial encounter  Secondary Diagnosis:	Cervical strain, acute, initial encounter

## 2020-02-04 NOTE — ED ADULT NURSE NOTE - LOCATION
[facility]   Nephrology progress note  1/28/2020, 1:50 PM      Pt Name:    Mary Crane  MRN:     [id number]     YOB: 1953  Admit Date:    1/26/2020  5:28 PM  Primary Care Physician:  Harrison Pitts MD   Room number  5R-19/220-L    Chief Complaint: Nephrology following for GRANT    Subjective:  Patient seen and examined   seen earlier this morning during rounds  Tachycardic  Reports some palpitations  No chest pain  Excellent urine output in last 24 hours    Objective:  24HR INTAKE/OUTPUT:      Intake/Output Summary (Last 24 hours) at 1/28/2020 1350  Last data filed at 1/28/2020 1330  Gross per 24 hour   Intake 3537.37 ml   Output 3000 ml   Net 537.37 ml     I/O last 3 completed shifts: In: 4603.4 [P.O.:1490; I.V.:3113.4]  Out: 2500 [Urine:2500]  I/O this shift:  In: 8728 [P.O.:170; I.V.:1264]  Out: 800 [Urine:800]  Admission weight: 215 lb 8 oz (97.8 kg)  Wt Readings from Last 3 Encounters:   01/28/20 216 lb 12.8 oz (98.3 kg)   09/24/12 190 lb (86.2 kg)   07/05/12 173 lb 8 oz (78.7 kg)     Body mass index is 43.79 kg/m².     Physical examination  VITALS:   /68, , RR 18, T 98.4  Vitals:    01/28/20 1342   BP:    Pulse: 81   Resp:    Temp:    SpO2:      General Appearance: Somewhat anxious but appears comfortable, no distress  Mouth/Throat: Oral mucosa moist  Neck: No JVD  Lungs: Air entry B/L, no rales, no use of accessory muscles  Heart:  S1, S2 heard, irregular  GI: soft, non-tender, no guarding  Extremities: Trace LE edema      Lab Data  CBC:   Recent Labs     01/27/20  0056 01/27/20  0558 01/28/20  0742   WBC 13.3* 11.9* 9.4   HGB 12.2 12.2 11.7*   HCT 39.3 39.8 38.4    202 192     BMP:  Recent Labs     01/26/20  1919 01/27/20  0558 01/28/20  0742    140 143   K 4.5 5.2 4.3    106 110   CO2 21* 20* 21*   BUN 46* 51* 45*   CREATININE 2.1* 2.6* 1.4*   GLUCOSE 97 100 93   CALCIUM 8.4* 8.2* 8.0*   PHOS  --  4.7  --      Hepatic:   Recent Labs neck/shoulder/lower back

## 2020-02-04 NOTE — ED PROVIDER NOTE - PATIENT PORTAL LINK FT
You can access the FollowMyHealth Patient Portal offered by [facility] by registering at the following website: [email or web address]. By joining [employer]’s FollowMyHealth portal, you will also be able to view your health information using other applications (apps) compatible with our system.

## 2020-02-04 NOTE — ED ADULT TRIAGE NOTE - CHIEF COMPLAINT QUOTE
Pt c/o left sided shoulder, leg and hand pain, and lower back pain s/p fall on Saturday. Pt states that her leg gave out on her. Pt denies dizziness, LOC, or use of blood thinners.

## 2020-02-04 NOTE — ED PROVIDER NOTE - PHYSICAL EXAMINATION
+ c7 midline tenderness , + l spine midline tenderness + L shoulder tenderness full rom , + tenderness L knee  full rom + wt bearing

## 2020-02-04 NOTE — ED PROVIDER NOTE - CHPI ED SYMPTOMS NEG
no weakness/no confusion/no vomiting/no loss of consciousness/no numbness/no tingling/no fever/no deformity/no bleeding/no abrasion

## 2020-02-04 NOTE — ED PROVIDER NOTE - CARE PROVIDER_API CALL
PRINCIPAL DISCHARGE DIAGNOSIS  Diagnosis: Acute respiratory failure, unspecified whether with hypoxia or hypercapnia  Assessment and Plan of Treatment: Acute respiratory failure, unspecified whether with hypoxia or hypercapnia      SECONDARY DISCHARGE DIAGNOSES  Diagnosis: Fluid overload  Assessment and Plan of Treatment:     Diagnosis: Chronic systolic right heart failure  Assessment and Plan of Treatment:     Diagnosis: Adjustment disorder with depressed mood  Assessment and Plan of Treatment:     Diagnosis: Acute deep vein thrombosis (DVT) of femoral vein of left lower extremity  Assessment and Plan of Treatment: Acute deep vein thrombosis (DVT) of femoral vein of left lower extremity    Diagnosis: Essential hypertension  Assessment and Plan of Treatment: Essential hypertension    Diagnosis: Acquired hypothyroidism  Assessment and Plan of Treatment: Acquired hypothyroidism    Diagnosis: Pulmonary hypertension  Assessment and Plan of Treatment: Pulmonary hypertension    Diagnosis: Hyponatremia  Assessment and Plan of Treatment: Hyponatremia
Sung Colby)  Orthopaedic Surgery  [address], [address]  [place], [place] [id number]  Phone: [phone]  Fax: [phone]  Follow Up Time:

## 2020-02-14 ENCOUNTER — OUTPATIENT (OUTPATIENT)
Dept: OUTPATIENT SERVICES | Facility: HOSPITAL | Age: 85
LOS: 1 days | End: 2020-02-14
Payer: MEDICARE

## 2020-02-14 ENCOUNTER — APPOINTMENT (OUTPATIENT)
Dept: MRI IMAGING | Facility: HOSPITAL | Age: 85
End: 2020-02-14
Payer: MEDICARE

## 2020-02-14 DIAGNOSIS — M51.36 OTHER INTERVERTEBRAL DISC DEGENERATION, LUMBAR REGION: ICD-10-CM

## 2020-02-14 DIAGNOSIS — Z98.89 OTHER SPECIFIED POSTPROCEDURAL STATES: Chronic | ICD-10-CM

## 2020-02-14 PROCEDURE — 72148 MRI LUMBAR SPINE W/O DYE: CPT | Mod: 26

## 2020-02-14 PROCEDURE — 72148 MRI LUMBAR SPINE W/O DYE: CPT

## 2020-03-03 ENCOUNTER — APPOINTMENT (OUTPATIENT)
Dept: ULTRASOUND IMAGING | Facility: HOSPITAL | Age: 85
End: 2020-03-03
Payer: MEDICARE

## 2020-03-03 ENCOUNTER — OUTPATIENT (OUTPATIENT)
Dept: OUTPATIENT SERVICES | Facility: HOSPITAL | Age: 85
LOS: 1 days | End: 2020-03-03
Payer: MEDICARE

## 2020-03-03 DIAGNOSIS — R42 DIZZINESS AND GIDDINESS: ICD-10-CM

## 2020-03-03 DIAGNOSIS — Z98.89 OTHER SPECIFIED POSTPROCEDURAL STATES: Chronic | ICD-10-CM

## 2020-03-03 PROCEDURE — 93880 EXTRACRANIAL BILAT STUDY: CPT | Mod: 26

## 2020-03-03 PROCEDURE — 93880 EXTRACRANIAL BILAT STUDY: CPT

## 2020-07-10 ENCOUNTER — EMERGENCY (EMERGENCY)
Facility: HOSPITAL | Age: 85
LOS: 1 days | Discharge: HOME | End: 2020-07-10
Attending: INTERNAL MEDICINE | Admitting: INTERNAL MEDICINE
Payer: MEDICARE

## 2020-07-10 VITALS
DIASTOLIC BLOOD PRESSURE: 88 MMHG | RESPIRATION RATE: 17 BRPM | HEART RATE: 81 BPM | OXYGEN SATURATION: 96 % | SYSTOLIC BLOOD PRESSURE: 135 MMHG

## 2020-07-10 VITALS
WEIGHT: 130.07 LBS | SYSTOLIC BLOOD PRESSURE: 139 MMHG | DIASTOLIC BLOOD PRESSURE: 85 MMHG | OXYGEN SATURATION: 96 % | HEART RATE: 85 BPM | TEMPERATURE: 98 F | HEIGHT: 63 IN | RESPIRATION RATE: 15 BRPM

## 2020-07-10 DIAGNOSIS — Z98.89 OTHER SPECIFIED POSTPROCEDURAL STATES: Chronic | ICD-10-CM

## 2020-07-10 DIAGNOSIS — R11.0 NAUSEA: ICD-10-CM

## 2020-07-10 LAB
ALBUMIN SERPL ELPH-MCNC: 4 G/DL — SIGNIFICANT CHANGE UP (ref 3.3–5)
ALP SERPL-CCNC: 79 U/L — SIGNIFICANT CHANGE UP (ref 40–120)
ALT FLD-CCNC: 37 U/L — SIGNIFICANT CHANGE UP (ref 10–45)
ANION GAP SERPL CALC-SCNC: 12 MMOL/L — SIGNIFICANT CHANGE UP (ref 5–17)
AST SERPL-CCNC: 29 U/L — SIGNIFICANT CHANGE UP (ref 10–40)
BASOPHILS # BLD AUTO: 0.03 K/UL — SIGNIFICANT CHANGE UP (ref 0–0.2)
BASOPHILS NFR BLD AUTO: 0.4 % — SIGNIFICANT CHANGE UP (ref 0–2)
BILIRUB SERPL-MCNC: 0.8 MG/DL — SIGNIFICANT CHANGE UP (ref 0.2–1.2)
BUN SERPL-MCNC: 20 MG/DL — SIGNIFICANT CHANGE UP (ref 7–23)
CALCIUM SERPL-MCNC: 9.2 MG/DL — SIGNIFICANT CHANGE UP (ref 8.4–10.5)
CHLORIDE SERPL-SCNC: 106 MMOL/L — SIGNIFICANT CHANGE UP (ref 96–108)
CO2 SERPL-SCNC: 25 MMOL/L — SIGNIFICANT CHANGE UP (ref 22–31)
CREAT SERPL-MCNC: 0.86 MG/DL — SIGNIFICANT CHANGE UP (ref 0.5–1.3)
EOSINOPHIL # BLD AUTO: 0.02 K/UL — SIGNIFICANT CHANGE UP (ref 0–0.5)
EOSINOPHIL NFR BLD AUTO: 0.3 % — SIGNIFICANT CHANGE UP (ref 0–6)
GLUCOSE SERPL-MCNC: 108 MG/DL — HIGH (ref 70–99)
HCT VFR BLD CALC: 39.6 % — SIGNIFICANT CHANGE UP (ref 34.5–45)
HGB BLD-MCNC: 13.6 G/DL — SIGNIFICANT CHANGE UP (ref 11.5–15.5)
IMM GRANULOCYTES NFR BLD AUTO: 0.3 % — SIGNIFICANT CHANGE UP (ref 0–1.5)
LIDOCAIN IGE QN: 241 U/L — SIGNIFICANT CHANGE UP (ref 73–393)
LYMPHOCYTES # BLD AUTO: 1 K/UL — SIGNIFICANT CHANGE UP (ref 1–3.3)
LYMPHOCYTES # BLD AUTO: 14.1 % — SIGNIFICANT CHANGE UP (ref 13–44)
MCHC RBC-ENTMCNC: 31.8 PG — SIGNIFICANT CHANGE UP (ref 27–34)
MCHC RBC-ENTMCNC: 34.3 GM/DL — SIGNIFICANT CHANGE UP (ref 32–36)
MCV RBC AUTO: 92.5 FL — SIGNIFICANT CHANGE UP (ref 80–100)
MONOCYTES # BLD AUTO: 0.48 K/UL — SIGNIFICANT CHANGE UP (ref 0–0.9)
MONOCYTES NFR BLD AUTO: 6.8 % — SIGNIFICANT CHANGE UP (ref 2–14)
NEUTROPHILS # BLD AUTO: 5.52 K/UL — SIGNIFICANT CHANGE UP (ref 1.8–7.4)
NEUTROPHILS NFR BLD AUTO: 78.1 % — HIGH (ref 43–77)
NRBC # BLD: 0 /100 WBCS — SIGNIFICANT CHANGE UP (ref 0–0)
PLATELET # BLD AUTO: 210 K/UL — SIGNIFICANT CHANGE UP (ref 150–400)
POTASSIUM SERPL-MCNC: 3.7 MMOL/L — SIGNIFICANT CHANGE UP (ref 3.5–5.3)
POTASSIUM SERPL-SCNC: 3.7 MMOL/L — SIGNIFICANT CHANGE UP (ref 3.5–5.3)
PROT SERPL-MCNC: 6.5 G/DL — SIGNIFICANT CHANGE UP (ref 6–8.3)
RBC # BLD: 4.28 M/UL — SIGNIFICANT CHANGE UP (ref 3.8–5.2)
RBC # FLD: 12.6 % — SIGNIFICANT CHANGE UP (ref 10.3–14.5)
SODIUM SERPL-SCNC: 143 MMOL/L — SIGNIFICANT CHANGE UP (ref 135–145)
TROPONIN I SERPL-MCNC: <.017 NG/ML — LOW (ref 0.02–0.06)
WBC # BLD: 7.07 K/UL — SIGNIFICANT CHANGE UP (ref 3.8–10.5)
WBC # FLD AUTO: 7.07 K/UL — SIGNIFICANT CHANGE UP (ref 3.8–10.5)

## 2020-07-10 PROCEDURE — 99285 EMERGENCY DEPT VISIT HI MDM: CPT

## 2020-07-10 PROCEDURE — 71045 X-RAY EXAM CHEST 1 VIEW: CPT | Mod: 26

## 2020-07-10 PROCEDURE — 84484 ASSAY OF TROPONIN QUANT: CPT

## 2020-07-10 PROCEDURE — 76705 ECHO EXAM OF ABDOMEN: CPT | Mod: 26

## 2020-07-10 PROCEDURE — 36415 COLL VENOUS BLD VENIPUNCTURE: CPT

## 2020-07-10 PROCEDURE — 99284 EMERGENCY DEPT VISIT MOD MDM: CPT | Mod: 25

## 2020-07-10 PROCEDURE — 83690 ASSAY OF LIPASE: CPT

## 2020-07-10 PROCEDURE — 93010 ELECTROCARDIOGRAM REPORT: CPT

## 2020-07-10 PROCEDURE — 80053 COMPREHEN METABOLIC PANEL: CPT

## 2020-07-10 PROCEDURE — 93005 ELECTROCARDIOGRAM TRACING: CPT

## 2020-07-10 PROCEDURE — 96361 HYDRATE IV INFUSION ADD-ON: CPT

## 2020-07-10 PROCEDURE — 85027 COMPLETE CBC AUTOMATED: CPT

## 2020-07-10 PROCEDURE — 96375 TX/PRO/DX INJ NEW DRUG ADDON: CPT

## 2020-07-10 PROCEDURE — 96365 THER/PROPH/DIAG IV INF INIT: CPT

## 2020-07-10 PROCEDURE — 76705 ECHO EXAM OF ABDOMEN: CPT

## 2020-07-10 PROCEDURE — 71045 X-RAY EXAM CHEST 1 VIEW: CPT

## 2020-07-10 RX ORDER — ONDANSETRON 8 MG/1
1 TABLET, FILM COATED ORAL
Qty: 21 | Refills: 0
Start: 2020-07-10 | End: 2020-07-16

## 2020-07-10 RX ORDER — SODIUM CHLORIDE 9 MG/ML
1850 INJECTION INTRAMUSCULAR; INTRAVENOUS; SUBCUTANEOUS ONCE
Refills: 0 | Status: COMPLETED | OUTPATIENT
Start: 2020-07-10 | End: 2020-07-10

## 2020-07-10 RX ORDER — FAMOTIDINE 10 MG/ML
1 INJECTION INTRAVENOUS
Qty: 20 | Refills: 0
Start: 2020-07-10 | End: 2020-07-19

## 2020-07-10 RX ORDER — ONDANSETRON 8 MG/1
4 TABLET, FILM COATED ORAL ONCE
Refills: 0 | Status: COMPLETED | OUTPATIENT
Start: 2020-07-10 | End: 2020-07-10

## 2020-07-10 RX ORDER — FAMOTIDINE 10 MG/ML
20 INJECTION INTRAVENOUS ONCE
Refills: 0 | Status: COMPLETED | OUTPATIENT
Start: 2020-07-10 | End: 2020-07-10

## 2020-07-10 RX ADMIN — SODIUM CHLORIDE 1850 MILLILITER(S): 9 INJECTION INTRAMUSCULAR; INTRAVENOUS; SUBCUTANEOUS at 11:50

## 2020-07-10 RX ADMIN — FAMOTIDINE 100 MILLIGRAM(S): 10 INJECTION INTRAVENOUS at 11:50

## 2020-07-10 RX ADMIN — ONDANSETRON 4 MILLIGRAM(S): 8 TABLET, FILM COATED ORAL at 11:50

## 2020-07-10 RX ADMIN — SODIUM CHLORIDE 1850 MILLILITER(S): 9 INJECTION INTRAMUSCULAR; INTRAVENOUS; SUBCUTANEOUS at 13:55

## 2020-07-10 RX ADMIN — FAMOTIDINE 20 MILLIGRAM(S): 10 INJECTION INTRAVENOUS at 12:20

## 2020-07-10 NOTE — ED ADULT NURSE NOTE - OBJECTIVE STATEMENT
Pt sent in by PCP from home for constant nausea and stomach upset for about 1 week. pt reports she feels like she has to vomit but cannot and reports she has not been able to eat or drink much of anything the last few days. Pt reports "I've had gall bladder problems in the past". pt with PMH of HLD, HRN and PSH of neck surgery in the past. Pt denies any v/d, chest pain, SOB, blurred vision, headache, dizziness, fever, chills or any other complaint at this time. Pt sent in by PCP from home for constant nausea, and intermittent RUQ pain for about 1 week. pt reports she feels like she has to vomit but cannot and reports she has not been able to eat or drink much of anything the last few days. Pt reports "I've had gall bladder problems in the past". pt with PMH of HLD, HRN and PSH of neck surgery in the past. Pt denies any v/d, chest pain, SOB, blurred vision, headache, dizziness, fever, chills or any other complaint at this time.

## 2020-07-10 NOTE — ED PROVIDER NOTE - PATIENT PORTAL LINK FT
You can access the FollowMyHealth Patient Portal offered by [facility] by registering at the following website: [email or web address]. By joining [employer]’s FollowMyHealth portal, you will also be able to view your health information using other applications (apps) compatible with our system.

## 2020-07-10 NOTE — ED PROVIDER NOTE - CARE PROVIDER_API CALL
Jose Leiva  COLON/RECTAL SURGERY  [address] [id number]  Phone: [phone]  Fax: [phone]  Follow Up Time:

## 2020-07-10 NOTE — ED ADULT NURSE REASSESSMENT NOTE - NS ED NURSE REASSESS COMMENT FT1
pt taken by transport to US at this time for US of abdomen. pt resting comfortably in stretcher, will continue to monitor closely when returns.

## 2020-07-17 ENCOUNTER — APPOINTMENT (OUTPATIENT)
Dept: SURGERY | Facility: CLINIC | Age: 85
End: 2020-07-17
Payer: MEDICARE

## 2020-07-17 DIAGNOSIS — E78.00 PURE HYPERCHOLESTEROLEMIA, UNSPECIFIED: ICD-10-CM

## 2020-07-17 DIAGNOSIS — Z86.79 PERSONAL HISTORY OF OTHER DISEASES OF THE CIRCULATORY SYSTEM: ICD-10-CM

## 2020-07-17 PROCEDURE — 99214 OFFICE O/P EST MOD 30 MIN: CPT

## 2020-07-18 NOTE — ASSESSMENT
[FreeTextEntry1] : Long discussion regarding all options and risks\par Scheduled for [facility] on 7/23/20

## 2020-07-18 NOTE — HISTORY OF PRESENT ILLNESS
[de-identified] : The patient continues with intermittent nausea and epigastric discomfort. She denies vomiting, fever or chills

## 2020-07-18 NOTE — PHYSICAL EXAM
[Normal Rate and Rhythm] : normal rate and rhythm [Normal Breath Sounds] : Normal breath sounds [Normal Heart Sounds] : normal heart sounds [No Rash or Lesion] : No rash or lesion [Abdomen Tenderness] : ~T ~M No abdominal tenderness [Abdominal Masses] : No abdominal masses [de-identified] : nl [de-identified] : nl

## 2020-07-20 ENCOUNTER — LABORATORY RESULT (OUTPATIENT)
Age: 85
End: 2020-07-20

## 2020-07-21 ENCOUNTER — OUTPATIENT (OUTPATIENT)
Dept: OUTPATIENT SERVICES | Facility: HOSPITAL | Age: 85
LOS: 1 days | End: 2020-07-21
Payer: MEDICARE

## 2020-07-21 VITALS
TEMPERATURE: 98 F | RESPIRATION RATE: 16 BRPM | DIASTOLIC BLOOD PRESSURE: 81 MMHG | SYSTOLIC BLOOD PRESSURE: 127 MMHG | WEIGHT: 138.89 LBS | HEIGHT: 63.5 IN | OXYGEN SATURATION: 97 % | HEART RATE: 58 BPM

## 2020-07-21 DIAGNOSIS — Z98.89 OTHER SPECIFIED POSTPROCEDURAL STATES: Chronic | ICD-10-CM

## 2020-07-21 DIAGNOSIS — I10 ESSENTIAL (PRIMARY) HYPERTENSION: ICD-10-CM

## 2020-07-21 DIAGNOSIS — Z98.890 OTHER SPECIFIED POSTPROCEDURAL STATES: Chronic | ICD-10-CM

## 2020-07-21 DIAGNOSIS — D49.7 NEOPLASM OF UNSPECIFIED BEHAVIOR OF ENDOCRINE GLANDS AND OTHER PARTS OF NERVOUS SYSTEM: Chronic | ICD-10-CM

## 2020-07-21 DIAGNOSIS — Z01.818 ENCOUNTER FOR OTHER PREPROCEDURAL EXAMINATION: ICD-10-CM

## 2020-07-21 DIAGNOSIS — K80.20 CALCULUS OF GALLBLADDER WITHOUT CHOLECYSTITIS WITHOUT OBSTRUCTION: ICD-10-CM

## 2020-07-21 DIAGNOSIS — E03.9 HYPOTHYROIDISM, UNSPECIFIED: ICD-10-CM

## 2020-07-21 DIAGNOSIS — E78.5 HYPERLIPIDEMIA, UNSPECIFIED: ICD-10-CM

## 2020-07-21 DIAGNOSIS — K81.1 CHRONIC CHOLECYSTITIS: ICD-10-CM

## 2020-07-21 LAB — BLD GP AB SCN SERPL QL: SIGNIFICANT CHANGE UP

## 2020-07-21 PROCEDURE — 86900 BLOOD TYPING SEROLOGIC ABO: CPT

## 2020-07-21 PROCEDURE — 86850 RBC ANTIBODY SCREEN: CPT

## 2020-07-21 PROCEDURE — G0463: CPT

## 2020-07-21 PROCEDURE — 86901 BLOOD TYPING SEROLOGIC RH(D): CPT

## 2020-07-21 PROCEDURE — 36415 COLL VENOUS BLD VENIPUNCTURE: CPT

## 2020-07-21 RX ORDER — ATORVASTATIN CALCIUM 80 MG/1
1 TABLET, FILM COATED ORAL
Qty: 0 | Refills: 0 | DISCHARGE

## 2020-07-21 RX ORDER — LOSARTAN POTASSIUM 100 MG/1
1 TABLET, FILM COATED ORAL
Qty: 0 | Refills: 0 | DISCHARGE

## 2020-07-21 NOTE — H&P PST ADULT - NSICDXFAMILYHX_GEN_ALL_CORE_FT
FAMILY HISTORY:  Sibling  Still living? Unknown  Family history of brain tumor, Age at diagnosis: Age Unknown  Family history of colon cancer, Age at diagnosis: Age Unknown  Family history of lung cancer, Age at diagnosis: Age Unknown

## 2020-07-21 NOTE — H&P PST ADULT - NEGATIVE CARDIOVASCULAR SYMPTOMS
no dyspnea on exertion/no paroxysmal nocturnal dyspnea/no chest pain/no orthopnea/no peripheral edema/no claudication/no palpitations

## 2020-07-21 NOTE — H&P PST ADULT - PSYCHIATRIC
details… Affect and characteristics of appearance, verbalizations, behaviors are appropriate negative detailed exam

## 2020-07-21 NOTE — H&P PST ADULT - NSICDXPASTSURGICALHX_GEN_ALL_CORE_FT
PAST SURGICAL HISTORY:  H/O thumb surgery left    History of cervical discectomy and fusion in 2007    Neoplasm of pituitary gland

## 2020-07-21 NOTE — H&P PST ADULT - MUSCULOSKELETAL
details… detailed exam ROM intact/normal strength no joint swelling/no joint erythema/normal strength/no joint warmth/ROM intact/no calf tenderness

## 2020-07-21 NOTE — H&P PST ADULT - NEGATIVE NEUROLOGICAL SYMPTOMS
no focal seizures/no syncope/no headache/no vertigo/no generalized seizures/no loss of sensation/no loss of consciousness/no transient paralysis/no tremors/no weakness/no paresthesias

## 2020-07-21 NOTE — H&P PST ADULT - NSANTHOSAYNRD_GEN_A_CORE
neck 13.5inches/No. ADRIENNE screening performed.  STOP BANG Legend: 0-2 = LOW Risk; 3-4 = INTERMEDIATE Risk; 5-8 = HIGH Risk

## 2020-07-21 NOTE — H&P PST ADULT - NSICDXPASTMEDICALHX_GEN_ALL_CORE_FT
PAST MEDICAL HISTORY:  Acromegaly     Calculus of gallbladder without cholecystitis without obstruction     Dyslipidemia     Hypertension     Parotid mass left. s/p biopsy ("inconclusive) instructed to have repeat scan done in March 2015.being monitored by Dr. Sung Oscar (ENT)    Pituitary macroadenoma PAST MEDICAL HISTORY:  Acromegaly     Calculus of gallbladder without cholecystitis without obstruction     Dyslipidemia     Hypertension     Murmur cardiac    Parotid mass left. s/p biopsy ("inconclusive) instructed to have repeat scan done in March 2015.being monitored by Dr. Sung Oscar (ENT)    Pituitary macroadenoma

## 2020-07-21 NOTE — H&P PST ADULT - NEGATIVE MUSCULOSKELETAL SYMPTOMS
no neck pain/no myalgia/no muscle cramps/no joint swelling/no muscle weakness/no arthralgia/no arm pain L

## 2020-07-21 NOTE — H&P PST ADULT - ACTIVITY
2 flights of stairs (16 steps) walk at least 5x/day including carrying groceries and laundry adls, chores, walks with a cane

## 2020-07-21 NOTE — H&P PST ADULT - NSICDXPROBLEM_GEN_ALL_CORE_FT
PROBLEM DIAGNOSES  Problem: Calculus of gallbladder without cholecystitis without obstruction  Assessment and Plan: Pre-op instructions given. Pt verbalized understanding  Chlorhexidine wash instructions given  Pending: M/C + Covid results, done 7/20    Problem: Hypothyroid  Assessment and Plan: Pt instructed to take meds as prescribed     Problem: Hypertension  Assessment and Plan: Pt instructed to take meds as prescribed     Problem: Hyperlipidemia  Assessment and Plan: Pt instructed to take meds as prescribed

## 2020-07-21 NOTE — H&P PST ADULT - HISTORY OF PRESENT ILLNESS
79 y/o F PMHx HTN, dyslipidemia, hypothyroid reports left parotid mass with MRI incidental finding of pituitary maccroadenoma. pt reports recent notice of acromegaly of bilateral hands. Plan for steoreotactic endoscopic endonasal resection of pituitary tumor 84yo female with medical h/o HTN, Dyslipidemia, Hypothyroid and Calculus of gallbladder. Pt reports recent bout of abdominal pain and was evaluated in ER for and d/c. Pt presents today for PST for Laparoscopic Cholecystectomy, Possible Open scheduled for 7/23/2020

## 2020-07-21 NOTE — H&P PST ADULT - MALLAMPATI CLASS
poor visualization with phonation/Class IV (difficult) - the soft palate is not visible at all Class II - visualization of the soft palate, fauces, and uvula

## 2020-07-21 NOTE — H&P PST ADULT - VISION (WITH CORRECTIVE LENSES IF THE PATIENT USUALLY WEARS THEM):
+ corrective glasses/Partially impaired: cannot see medication labels or newsprint, but can see obstacles in path, and the surrounding layout; can count fingers at arm's length

## 2020-07-22 ENCOUNTER — TRANSCRIPTION ENCOUNTER (OUTPATIENT)
Age: 85
End: 2020-07-22

## 2020-07-23 ENCOUNTER — RESULT REVIEW (OUTPATIENT)
Age: 85
End: 2020-07-23

## 2020-07-23 ENCOUNTER — OUTPATIENT (OUTPATIENT)
Dept: OUTPATIENT SERVICES | Facility: HOSPITAL | Age: 85
LOS: 1 days | End: 2020-07-23
Payer: MEDICARE

## 2020-07-23 VITALS
HEART RATE: 60 BPM | RESPIRATION RATE: 16 BRPM | TEMPERATURE: 98 F | DIASTOLIC BLOOD PRESSURE: 64 MMHG | HEIGHT: 63.5 IN | OXYGEN SATURATION: 98 % | SYSTOLIC BLOOD PRESSURE: 119 MMHG | WEIGHT: 138.89 LBS

## 2020-07-23 VITALS
SYSTOLIC BLOOD PRESSURE: 165 MMHG | TEMPERATURE: 97 F | HEART RATE: 62 BPM | OXYGEN SATURATION: 98 % | DIASTOLIC BLOOD PRESSURE: 85 MMHG | RESPIRATION RATE: 16 BRPM

## 2020-07-23 DIAGNOSIS — Z98.890 OTHER SPECIFIED POSTPROCEDURAL STATES: Chronic | ICD-10-CM

## 2020-07-23 DIAGNOSIS — D49.7 NEOPLASM OF UNSPECIFIED BEHAVIOR OF ENDOCRINE GLANDS AND OTHER PARTS OF NERVOUS SYSTEM: Chronic | ICD-10-CM

## 2020-07-23 DIAGNOSIS — Z98.89 OTHER SPECIFIED POSTPROCEDURAL STATES: Chronic | ICD-10-CM

## 2020-07-23 DIAGNOSIS — K80.20 CALCULUS OF GALLBLADDER WITHOUT CHOLECYSTITIS WITHOUT OBSTRUCTION: ICD-10-CM

## 2020-07-23 DIAGNOSIS — K81.1 CHRONIC CHOLECYSTITIS: ICD-10-CM

## 2020-07-23 PROCEDURE — 47562 LAPAROSCOPIC CHOLECYSTECTOMY: CPT

## 2020-07-23 PROCEDURE — 47562 LAPAROSCOPIC CHOLECYSTECTOMY: CPT | Mod: AS

## 2020-07-23 PROCEDURE — 88304 TISSUE EXAM BY PATHOLOGIST: CPT

## 2020-07-23 PROCEDURE — 88304 TISSUE EXAM BY PATHOLOGIST: CPT | Mod: 26

## 2020-07-23 PROCEDURE — C1889: CPT

## 2020-07-23 RX ORDER — ONDANSETRON 8 MG/1
4 TABLET, FILM COATED ORAL ONCE
Refills: 0 | Status: DISCONTINUED | OUTPATIENT
Start: 2020-07-23 | End: 2020-07-23

## 2020-07-23 RX ORDER — SODIUM CHLORIDE 9 MG/ML
1000 INJECTION, SOLUTION INTRAVENOUS
Refills: 0 | Status: DISCONTINUED | OUTPATIENT
Start: 2020-07-23 | End: 2020-07-23

## 2020-07-23 RX ORDER — HYDROMORPHONE HYDROCHLORIDE 2 MG/ML
0.25 INJECTION INTRAMUSCULAR; INTRAVENOUS; SUBCUTANEOUS ONCE
Refills: 0 | Status: DISCONTINUED | OUTPATIENT
Start: 2020-07-23 | End: 2020-07-23

## 2020-07-23 RX ORDER — ACETAMINOPHEN 500 MG
650 TABLET ORAL EVERY 6 HOURS
Refills: 0 | Status: DISCONTINUED | OUTPATIENT
Start: 2020-07-23 | End: 2020-08-07

## 2020-07-23 RX ORDER — OXYCODONE HYDROCHLORIDE 5 MG/1
5 TABLET ORAL EVERY 4 HOURS
Refills: 0 | Status: DISCONTINUED | OUTPATIENT
Start: 2020-07-23 | End: 2020-07-23

## 2020-07-23 RX ORDER — OXYCODONE AND ACETAMINOPHEN 5; 325 MG/1; MG/1
1 TABLET ORAL
Qty: 12 | Refills: 0
Start: 2020-07-23 | End: 2020-07-25

## 2020-07-23 RX ORDER — HYDROMORPHONE HYDROCHLORIDE 2 MG/ML
0.5 INJECTION INTRAMUSCULAR; INTRAVENOUS; SUBCUTANEOUS ONCE
Refills: 0 | Status: DISCONTINUED | OUTPATIENT
Start: 2020-07-23 | End: 2020-07-23

## 2020-07-23 RX ADMIN — SODIUM CHLORIDE 30 MILLILITER(S): 9 INJECTION, SOLUTION INTRAVENOUS at 10:39

## 2020-07-23 RX ADMIN — HYDROMORPHONE HYDROCHLORIDE 0.5 MILLIGRAM(S): 2 INJECTION INTRAMUSCULAR; INTRAVENOUS; SUBCUTANEOUS at 15:00

## 2020-07-23 RX ADMIN — HYDROMORPHONE HYDROCHLORIDE 0.25 MILLIGRAM(S): 2 INJECTION INTRAMUSCULAR; INTRAVENOUS; SUBCUTANEOUS at 15:31

## 2020-07-23 RX ADMIN — HYDROMORPHONE HYDROCHLORIDE 0.5 MILLIGRAM(S): 2 INJECTION INTRAMUSCULAR; INTRAVENOUS; SUBCUTANEOUS at 14:50

## 2020-07-23 NOTE — ASU PATIENT PROFILE, ADULT - PMH
Acromegaly    Calculus of gallbladder without cholecystitis without obstruction    Dyslipidemia    Hypertension    Murmur  cardiac  Parotid mass  left. s/p biopsy ("inconclusive) instructed to have repeat scan done in March 2015.being monitored by Dr. Sung Oscar (ENT)  Pituitary macroadenoma

## 2020-07-23 NOTE — BRIEF OPERATIVE NOTE - NSICDXBRIEFPOSTOP_GEN_ALL_CORE_FT
POST-OP DIAGNOSIS:  S/P laparoscopic cholecystectomy 23-Jul-2020 14:26:57  Rosa Whitt  Calculus of gallbladder with acute on chronic cholecystitis 23-Jul-2020 14:26:37  Rosa Whitt

## 2020-07-23 NOTE — ASU DISCHARGE PLAN (ADULT/PEDIATRIC) - ASU DC SPECIAL INSTRUCTIONSFT
Keep dressings dry for 24 to 48 hours then may shower over steri strips.  If they fall off may replace with bandaids if drainage.  Drink plenty of fluids and take a stool softener or laxative to avoid constipation while taking   narcotics.    Expect some right shoulder pain from the gas insufflation during procedure should resolve in 24 -48 hours  Take Percocet for moderate to severe pain, if mild pain take only tylenol   Follow up with Dr Leiva in one week call office for appointment

## 2020-07-23 NOTE — ASU DISCHARGE PLAN (ADULT/PEDIATRIC) - CARE PROVIDER_API CALL
Jose Leiva  COLON/RECTAL SURGERY  [address] [id number]  Phone: [phone]  Fax: [phone]  Follow Up Time: 1 week

## 2020-07-23 NOTE — ASU PATIENT PROFILE, ADULT - VISION (WITH CORRECTIVE LENSES IF THE PATIENT USUALLY WEARS THEM):
+ corrective glasses/Partially impaired: cannot see medication labels or newsprint, but can see obstacles in path, and the surrounding layout; can count fingers at arm's length + corrective glasses/Normal vision: sees adequately in most situations; can see medication labels, newsprint

## 2020-07-23 NOTE — ASU PREOP CHECKLIST - LOOSE TEETH
Pt arrived amb for Tecentriq infusion  Using personal O2 @ 2L via nasal cannula  Made comfortable  Denies c/o  Port accessed LCW, NSS to kvo  Waiting for med from pharmacy 
Pt tolerated tecentriq infusion with no adverse reactions  Pt educated to notify MD office s/s of reaction or concerns  Pt then d/c'd home with  
no

## 2020-07-23 NOTE — PRE-ANESTHESIA EVALUATION ADULT - NSANTHADDINFOFT_GEN_ALL_CORE
Discussed GA with ETT in detail with patient and all questions sought and answered. Pt. agrees to all plans and wishes to proceed with surgical care.    Medical evaluation/optimization and clearance noted and appreciated.

## 2020-07-23 NOTE — ASU PATIENT PROFILE, ADULT - PSH
H/O thumb surgery  left  History of cervical discectomy  and fusion in 2007  Neoplasm of pituitary gland

## 2020-07-28 LAB — SURGICAL PATHOLOGY STUDY: SIGNIFICANT CHANGE UP

## 2020-07-30 PROBLEM — R01.1 CARDIAC MURMUR, UNSPECIFIED: Chronic | Status: ACTIVE | Noted: 2020-07-21

## 2020-07-30 PROBLEM — K80.20 CALCULUS OF GALLBLADDER WITHOUT CHOLECYSTITIS WITHOUT OBSTRUCTION: Chronic | Status: ACTIVE | Noted: 2020-07-21

## 2020-07-31 ENCOUNTER — APPOINTMENT (OUTPATIENT)
Dept: SURGERY | Facility: CLINIC | Age: 85
End: 2020-07-31
Payer: MEDICARE

## 2020-07-31 DIAGNOSIS — R10.13 EPIGASTRIC PAIN: ICD-10-CM

## 2020-07-31 PROCEDURE — 99024 POSTOP FOLLOW-UP VISIT: CPT

## 2020-08-02 PROBLEM — R10.13 ABDOMINAL PAIN, EPIGASTRIC: Status: ACTIVE | Noted: 2020-07-18

## 2020-08-02 NOTE — PHYSICAL EXAM
[de-identified] : wounds healing well [Abdomen Tenderness] : ~T ~M No abdominal tenderness [Abdominal Masses] : No abdominal masses

## 2020-11-15 NOTE — DISCHARGE NOTE ADULT - VISION (WITH CORRECTIVE LENSES IF THE PATIENT USUALLY WEARS THEM):
BP dropping. Order to restart levophed at 0.01mcg/kg/hr   Normal vision: sees adequately in most situations; can see medication labels, newsprint

## 2021-09-29 ENCOUNTER — APPOINTMENT (OUTPATIENT)
Dept: FAMILY MEDICINE | Facility: CLINIC | Age: 86
End: 2021-09-29
Payer: MEDICARE

## 2021-09-29 VITALS
OXYGEN SATURATION: 99 % | SYSTOLIC BLOOD PRESSURE: 130 MMHG | WEIGHT: 135 LBS | BODY MASS INDEX: 23.91 KG/M2 | RESPIRATION RATE: 16 BRPM | DIASTOLIC BLOOD PRESSURE: 80 MMHG | TEMPERATURE: 96.4 F | HEART RATE: 64 BPM

## 2021-09-29 DIAGNOSIS — Z82.49 FAMILY HISTORY OF ISCHEMIC HEART DISEASE AND OTHER DISEASES OF THE CIRCULATORY SYSTEM: ICD-10-CM

## 2021-09-29 DIAGNOSIS — D49.7 NEOPLASM OF UNSPECIFIED BEHAVIOR OF ENDOCRINE GLANDS AND OTHER PARTS OF NERVOUS SYSTEM: ICD-10-CM

## 2021-09-29 DIAGNOSIS — Z87.898 PERSONAL HISTORY OF OTHER SPECIFIED CONDITIONS: ICD-10-CM

## 2021-09-29 PROCEDURE — 99204 OFFICE O/P NEW MOD 45 MIN: CPT

## 2021-09-29 RX ORDER — RANITIDINE 150 MG/1
150 TABLET ORAL
Qty: 60 | Refills: 0 | Status: DISCONTINUED | COMMUNITY
Start: 2019-02-06 | End: 2021-09-29

## 2021-09-30 LAB — PROLACTIN SERPL-MCNC: 5 NG/ML

## 2021-10-03 NOTE — ASSESSMENT
[FreeTextEntry1] : prolactin level drawn in office\par referral to [facility] care program, patient states she cannot come in to office for visits\par RTO 3 months for f/u if unable to start home care program

## 2021-10-03 NOTE — PHYSICAL EXAM
[No Acute Distress] : no acute distress [Well Nourished] : well nourished [Well Developed] : well developed [Well-Appearing] : well-appearing [Normal Voice/Communication] : normal voice/communication [Normal Sclera/Conjunctiva] : normal sclera/conjunctiva [PERRL] : pupils equal round and reactive to light [Normal Oropharynx] : the oropharynx was normal [No Respiratory Distress] : no respiratory distress  [Supple] : supple [No Accessory Muscle Use] : no accessory muscle use [Clear to Auscultation] : lungs were clear to auscultation bilaterally [Normal Rate] : normal rate  [Regular Rhythm] : with a regular rhythm [Normal S1, S2] : normal S1 and S2 [No Edema] : there was no peripheral edema [Speech Grossly Normal] : speech grossly normal [Memory Grossly Normal] : memory grossly normal [Normal Affect] : the affect was normal [Alert and Oriented x3] : oriented to person, place, and time [Normal Mood] : the mood was normal [Normal Insight/Judgement] : insight and judgment were intact [de-identified] : toe web spaces: sl macerated

## 2021-11-09 RX ORDER — LEVOTHYROXINE SODIUM 0.05 MG/1
50 TABLET ORAL
Qty: 90 | Refills: 0 | Status: DISCONTINUED | COMMUNITY
Start: 2018-09-06 | End: 2021-11-09

## 2022-01-05 ENCOUNTER — APPOINTMENT (OUTPATIENT)
Dept: FAMILY MEDICINE | Facility: CLINIC | Age: 87
End: 2022-01-05
Payer: MEDICARE

## 2022-01-05 VITALS
SYSTOLIC BLOOD PRESSURE: 132 MMHG | RESPIRATION RATE: 14 BRPM | BODY MASS INDEX: 23.74 KG/M2 | TEMPERATURE: 96.8 F | HEART RATE: 60 BPM | DIASTOLIC BLOOD PRESSURE: 78 MMHG | WEIGHT: 134 LBS | OXYGEN SATURATION: 100 %

## 2022-01-05 DIAGNOSIS — E03.9 HYPOTHYROIDISM, UNSPECIFIED: ICD-10-CM

## 2022-01-05 DIAGNOSIS — G62.9 POLYNEUROPATHY, UNSPECIFIED: ICD-10-CM

## 2022-01-05 DIAGNOSIS — Z13.220 ENCOUNTER FOR SCREENING FOR LIPOID DISORDERS: ICD-10-CM

## 2022-01-05 PROCEDURE — 99214 OFFICE O/P EST MOD 30 MIN: CPT

## 2022-01-05 NOTE — HISTORY OF PRESENT ILLNESS
[FreeTextEntry1] : Nita presents with her aid for f/u HTN and hypothyroid. She states she is feeling ok, has not yet been able to start with a homecare physician. She states she usually has home blood draws as it is hard for her to walk to get to appointments. No ha, blurry vision or epistaxis reported. She was told by previous physician that she had nerve problems in her legs. \par \par ROS: negative except as noted above\par

## 2022-01-05 NOTE — PHYSICAL EXAM
[No Acute Distress] : no acute distress [Well Nourished] : well nourished [Well Developed] : well developed [Well-Appearing] : well-appearing [Normal Voice/Communication] : normal voice/communication [Normal Sclera/Conjunctiva] : normal sclera/conjunctiva [Normal Outer Ear/Nose] : the outer ears and nose were normal in appearance [Supple] : supple [No Respiratory Distress] : no respiratory distress  [No Accessory Muscle Use] : no accessory muscle use [Clear to Auscultation] : lungs were clear to auscultation bilaterally [Normal Rate] : normal rate  [Regular Rhythm] : with a regular rhythm [Normal S1, S2] : normal S1 and S2 [de-identified] : refusing to remove her boots for foot exam. Using cane to ambulate, also with assistance of aid.

## 2022-01-05 NOTE — ASSESSMENT
[FreeTextEntry1] : blood work ordered, home draw to be set up by \par referal to [facility] homecare program\par RTO 3 months if unable to connect with home care doctor team

## 2022-01-21 DIAGNOSIS — E78.00 PURE HYPERCHOLESTEROLEMIA, UNSPECIFIED: ICD-10-CM

## 2022-01-21 DIAGNOSIS — G47.00 INSOMNIA, UNSPECIFIED: ICD-10-CM

## 2022-01-21 LAB
ALBUMIN SERPL ELPH-MCNC: 4.4 G/DL
ALP BLD-CCNC: 105 U/L
ALT SERPL-CCNC: 16 U/L
ANION GAP SERPL CALC-SCNC: 11 MMOL/L
AST SERPL-CCNC: 21 U/L
BASOPHILS # BLD AUTO: 0.04 K/UL
BASOPHILS NFR BLD AUTO: 0.5 %
BILIRUB SERPL-MCNC: 0.6 MG/DL
BUN SERPL-MCNC: 25 MG/DL
CALCIUM SERPL-MCNC: 10.3 MG/DL
CHLORIDE SERPL-SCNC: 105 MMOL/L
CHOLEST SERPL-MCNC: 167 MG/DL
CO2 SERPL-SCNC: 25 MMOL/L
CREAT SERPL-MCNC: 0.75 MG/DL
EOSINOPHIL # BLD AUTO: 0.1 K/UL
EOSINOPHIL NFR BLD AUTO: 1.2 %
GLUCOSE SERPL-MCNC: 91 MG/DL
HCT VFR BLD CALC: 42.3 %
HDLC SERPL-MCNC: 58 MG/DL
HGB BLD-MCNC: 13.4 G/DL
IMM GRANULOCYTES NFR BLD AUTO: 0.2 %
LDLC SERPL CALC-MCNC: 89 MG/DL
LYMPHOCYTES # BLD AUTO: 1.82 K/UL
LYMPHOCYTES NFR BLD AUTO: 21.6 %
MAN DIFF?: NORMAL
MCHC RBC-ENTMCNC: 30.9 PG
MCHC RBC-ENTMCNC: 31.7 GM/DL
MCV RBC AUTO: 97.5 FL
MONOCYTES # BLD AUTO: 0.67 K/UL
MONOCYTES NFR BLD AUTO: 7.9 %
NEUTROPHILS # BLD AUTO: 5.78 K/UL
NEUTROPHILS NFR BLD AUTO: 68.6 %
NONHDLC SERPL-MCNC: 109 MG/DL
PLATELET # BLD AUTO: 270 K/UL
POTASSIUM SERPL-SCNC: 4.7 MMOL/L
PROT SERPL-MCNC: 6.4 G/DL
RBC # BLD: 4.34 M/UL
RBC # FLD: 12.9 %
SODIUM SERPL-SCNC: 141 MMOL/L
TRIGL SERPL-MCNC: 101 MG/DL
TSH SERPL-ACNC: 1.91 UIU/ML
WBC # FLD AUTO: 8.43 K/UL

## 2022-01-21 RX ORDER — GABAPENTIN 100 MG/1
100 CAPSULE ORAL
Qty: 30 | Refills: 0 | Status: DISCONTINUED | COMMUNITY
Start: 2022-01-05 | End: 2022-01-21

## 2022-01-21 RX ORDER — ATORVASTATIN CALCIUM 20 MG/1
20 TABLET, FILM COATED ORAL
Qty: 90 | Refills: 0 | Status: DISCONTINUED | COMMUNITY
Start: 2018-11-09 | End: 2022-01-21

## 2022-04-25 ENCOUNTER — APPOINTMENT (OUTPATIENT)
Dept: FAMILY MEDICINE | Facility: CLINIC | Age: 87
End: 2022-04-25
Payer: MEDICARE

## 2022-04-25 VITALS
OXYGEN SATURATION: 98 % | RESPIRATION RATE: 14 BRPM | TEMPERATURE: 97.1 F | SYSTOLIC BLOOD PRESSURE: 155 MMHG | HEART RATE: 66 BPM | DIASTOLIC BLOOD PRESSURE: 77 MMHG | BODY MASS INDEX: 24.66 KG/M2 | WEIGHT: 134 LBS | HEIGHT: 62 IN

## 2022-04-25 DIAGNOSIS — H35.30 UNSPECIFIED MACULAR DEGENERATION: ICD-10-CM

## 2022-04-25 PROCEDURE — 99214 OFFICE O/P EST MOD 30 MIN: CPT

## 2022-05-02 NOTE — HISTORY OF PRESENT ILLNESS
[FreeTextEntry1] : Presents with aid c/o lower back pain bothering her more lately. Had bothered her during physical therapy. Last 3 days lower back is worse. No bowel or bladder incontinence, no urinary retention, no numbness or tingling in legs, no weakness. H/o surgery on neck with Dr. Cohn. \par \par will order MRI LS spine\par \par ROS: negative except as noted above\par

## 2022-05-02 NOTE — ASSESSMENT
[FreeTextEntry1] : MRI ls spine ordered \par RTO 3 months for f/u earlier if symptoms worsen or persist

## 2022-05-02 NOTE — PHYSICAL EXAM
[No Acute Distress] : no acute distress [Well Nourished] : well nourished [Well Developed] : well developed [Well-Appearing] : well-appearing [Normal Voice/Communication] : normal voice/communication [Normal Sclera/Conjunctiva] : normal sclera/conjunctiva [Normal Outer Ear/Nose] : the outer ears and nose were normal in appearance [Normal Oropharynx] : the oropharynx was normal [No Lymphadenopathy] : no lymphadenopathy [Supple] : supple [No Respiratory Distress] : no respiratory distress  [No Accessory Muscle Use] : no accessory muscle use [Clear to Auscultation] : lungs were clear to auscultation bilaterally [Normal Rate] : normal rate  [Regular Rhythm] : with a regular rhythm [Normal S1, S2] : normal S1 and S2 [Coordination Grossly Intact] : coordination grossly intact [No Focal Deficits] : no focal deficits [Speech Grossly Normal] : speech grossly normal [Memory Grossly Normal] : memory grossly normal [Normal Affect] : the affect was normal [Alert and Oriented x3] : oriented to person, place, and time [Normal Mood] : the mood was normal [Normal Insight/Judgement] : insight and judgment were intact [de-identified] : using cane to ambulate, aid also assists patient to walk [de-identified] : 5/5 strength b/l LE, unsteady gait [de-identified] : using cane and assistance to ambulate

## 2022-05-04 ENCOUNTER — APPOINTMENT (OUTPATIENT)
Dept: MRI IMAGING | Facility: HOSPITAL | Age: 87
End: 2022-05-04
Payer: MEDICARE

## 2022-05-04 ENCOUNTER — RESULT REVIEW (OUTPATIENT)
Age: 87
End: 2022-05-04

## 2022-05-04 ENCOUNTER — OUTPATIENT (OUTPATIENT)
Dept: OUTPATIENT SERVICES | Facility: HOSPITAL | Age: 87
LOS: 1 days | End: 2022-05-04
Payer: MEDICARE

## 2022-05-04 DIAGNOSIS — Z98.890 OTHER SPECIFIED POSTPROCEDURAL STATES: Chronic | ICD-10-CM

## 2022-05-04 DIAGNOSIS — D49.7 NEOPLASM OF UNSPECIFIED BEHAVIOR OF ENDOCRINE GLANDS AND OTHER PARTS OF NERVOUS SYSTEM: Chronic | ICD-10-CM

## 2022-05-04 DIAGNOSIS — Z98.89 OTHER SPECIFIED POSTPROCEDURAL STATES: Chronic | ICD-10-CM

## 2022-05-04 DIAGNOSIS — M54.50 LOW BACK PAIN, UNSPECIFIED: ICD-10-CM

## 2022-05-04 PROCEDURE — 72148 MRI LUMBAR SPINE W/O DYE: CPT | Mod: 26,MH

## 2022-05-04 PROCEDURE — 72148 MRI LUMBAR SPINE W/O DYE: CPT

## 2022-06-09 ENCOUNTER — APPOINTMENT (OUTPATIENT)
Dept: ULTRASOUND IMAGING | Facility: HOSPITAL | Age: 87
End: 2022-06-09
Payer: MEDICARE

## 2022-06-09 ENCOUNTER — OUTPATIENT (OUTPATIENT)
Dept: OUTPATIENT SERVICES | Facility: HOSPITAL | Age: 87
LOS: 1 days | End: 2022-06-09
Payer: MEDICARE

## 2022-06-09 DIAGNOSIS — N94.9 UNSPECIFIED CONDITION ASSOCIATED WITH FEMALE GENITAL ORGANS AND MENSTRUAL CYCLE: ICD-10-CM

## 2022-06-09 DIAGNOSIS — Z98.890 OTHER SPECIFIED POSTPROCEDURAL STATES: Chronic | ICD-10-CM

## 2022-06-09 DIAGNOSIS — Z98.89 OTHER SPECIFIED POSTPROCEDURAL STATES: Chronic | ICD-10-CM

## 2022-06-09 DIAGNOSIS — D49.7 NEOPLASM OF UNSPECIFIED BEHAVIOR OF ENDOCRINE GLANDS AND OTHER PARTS OF NERVOUS SYSTEM: Chronic | ICD-10-CM

## 2022-06-09 PROCEDURE — 76856 US EXAM PELVIC COMPLETE: CPT

## 2022-06-09 PROCEDURE — 76856 US EXAM PELVIC COMPLETE: CPT | Mod: 26

## 2022-06-27 ENCOUNTER — OUTPATIENT (OUTPATIENT)
Dept: OUTPATIENT SERVICES | Facility: HOSPITAL | Age: 87
LOS: 1 days | End: 2022-06-27
Payer: MEDICARE

## 2022-06-27 ENCOUNTER — APPOINTMENT (OUTPATIENT)
Dept: MRI IMAGING | Facility: HOSPITAL | Age: 87
End: 2022-06-27

## 2022-06-27 DIAGNOSIS — Z98.890 OTHER SPECIFIED POSTPROCEDURAL STATES: Chronic | ICD-10-CM

## 2022-06-27 DIAGNOSIS — N94.9 UNSPECIFIED CONDITION ASSOCIATED WITH FEMALE GENITAL ORGANS AND MENSTRUAL CYCLE: ICD-10-CM

## 2022-06-27 DIAGNOSIS — D49.7 NEOPLASM OF UNSPECIFIED BEHAVIOR OF ENDOCRINE GLANDS AND OTHER PARTS OF NERVOUS SYSTEM: Chronic | ICD-10-CM

## 2022-06-27 DIAGNOSIS — Z98.89 OTHER SPECIFIED POSTPROCEDURAL STATES: Chronic | ICD-10-CM

## 2022-06-27 PROCEDURE — 72195 MRI PELVIS W/O DYE: CPT

## 2022-06-27 PROCEDURE — 72195 MRI PELVIS W/O DYE: CPT | Mod: 26,MH

## 2022-07-06 ENCOUNTER — TRANSCRIPTION ENCOUNTER (OUTPATIENT)
Age: 87
End: 2022-07-06

## 2022-07-11 ENCOUNTER — APPOINTMENT (OUTPATIENT)
Dept: FAMILY MEDICINE | Facility: CLINIC | Age: 87
End: 2022-07-11

## 2022-07-11 VITALS
SYSTOLIC BLOOD PRESSURE: 129 MMHG | WEIGHT: 130 LBS | DIASTOLIC BLOOD PRESSURE: 71 MMHG | RESPIRATION RATE: 14 BRPM | TEMPERATURE: 97.1 F | HEART RATE: 69 BPM | BODY MASS INDEX: 23.78 KG/M2

## 2022-07-11 DIAGNOSIS — I10 ESSENTIAL (PRIMARY) HYPERTENSION: ICD-10-CM

## 2022-07-11 DIAGNOSIS — N83.8 OTHER NONINFLAMMATORY DISORDERS OF OVARY, FALLOPIAN TUBE AND BROAD LIGAMENT: ICD-10-CM

## 2022-07-11 PROCEDURE — 99214 OFFICE O/P EST MOD 30 MIN: CPT

## 2022-07-15 PROBLEM — N83.8 OVARIAN MASS: Status: ACTIVE | Noted: 2022-07-15

## 2022-07-15 PROBLEM — I10 BENIGN ESSENTIAL HYPERTENSION: Status: ACTIVE | Noted: 2021-10-19

## 2022-07-15 NOTE — HISTORY OF PRESENT ILLNESS
[FreeTextEntry1] : Nita presents with aid for f/u HTN and ovarian mass. Has not yet made appointment with gyn, Dr. Josseline Watters. No ha, blurry vision or epistaxis. Feels well. \par \par ROS: negative except as noted above\par

## 2022-07-15 NOTE — PHYSICAL EXAM
[No Acute Distress] : no acute distress [Well Nourished] : well nourished [Well Developed] : well developed [Well-Appearing] : well-appearing [Normal Voice/Communication] : normal voice/communication [Normal Sclera/Conjunctiva] : normal sclera/conjunctiva [Normal Outer Ear/Nose] : the outer ears and nose were normal in appearance [Supple] : supple [No Respiratory Distress] : no respiratory distress  [No Accessory Muscle Use] : no accessory muscle use [Clear to Auscultation] : lungs were clear to auscultation bilaterally [Normal Rate] : normal rate  [Regular Rhythm] : with a regular rhythm [Normal S1, S2] : normal S1 and S2 [de-identified] : using cane to ambulate

## 2022-07-15 NOTE — ASSESSMENT
[FreeTextEntry1] : d/w Nita importance of consultation/visit with gyn re: ovarian mass, risk of cancer\par RTO 2 months for f/u\par

## 2022-08-16 ENCOUNTER — APPOINTMENT (OUTPATIENT)
Dept: OBGYN | Facility: CLINIC | Age: 87
End: 2022-08-16

## 2022-08-16 VITALS
TEMPERATURE: 97.6 F | HEART RATE: 65 BPM | WEIGHT: 130 LBS | HEIGHT: 62 IN | DIASTOLIC BLOOD PRESSURE: 68 MMHG | BODY MASS INDEX: 23.92 KG/M2 | SYSTOLIC BLOOD PRESSURE: 110 MMHG | OXYGEN SATURATION: 98 %

## 2022-08-16 DIAGNOSIS — N83.209 UNSPECIFIED OVARIAN CYST, UNSPECIFIED SIDE: ICD-10-CM

## 2022-08-16 PROCEDURE — 99203 OFFICE O/P NEW LOW 30 MIN: CPT

## 2022-08-16 NOTE — PLAN
[FreeTextEntry1] : Simple ROV cyst noted on Pelvic non contrast MRI. Pt asymptomatic, would fu with serum tumor markers. If negative and patient asymptomatic, would fu with TVUS at 6 mo then yearly.\par \par I spent the time noted on the day of this patient encounter preparing for, providing and documenting the above E/M service and counseling and educate patient on differential, workup, disease course, and treatment/management. Education was provided to the patient during this encounter. All questions and concerns were answered and addressed in detail.\par \par Josseline Watters MD\par

## 2022-08-16 NOTE — HISTORY OF PRESENT ILLNESS
[FreeTextEntry1] : 89 yo P3 with LMP 50s presents today for consultation for incidental finding of ROV cyst (simple ~3.4 cm) on MRI. No evidence of LAD. Denies pain, bloating, night sweats, fever, nausea, vomiting or unintentional weight loss. \par \par \par POB: SVDx3\par PGYN: , denies abl pap\par PMH: hypothyroid, HLD, HTN\par PSH: hanane, laminectomy C spine\par Med: per MAR\par All: NKMA\par SH: denies \par FH: father with throat CA, sister lung ca\par

## 2022-08-16 NOTE — COUNSELING
[Nutrition/ Exercise/ Weight Management] : nutrition, exercise, weight management [Breast Self Exam] : breast self exam [Bladder Hygiene] : bladder hygiene [Confidentiality] : confidentiality [STD (testing, results, tx)] : STD (testing, results, tx) [Lab Results] : lab results

## 2022-08-29 ENCOUNTER — TRANSCRIPTION ENCOUNTER (OUTPATIENT)
Age: 87
End: 2022-08-29

## 2022-08-29 ENCOUNTER — APPOINTMENT (OUTPATIENT)
Dept: FAMILY MEDICINE | Facility: CLINIC | Age: 87
End: 2022-08-29

## 2022-08-29 DIAGNOSIS — N76.0 ACUTE VAGINITIS: ICD-10-CM

## 2022-08-29 DIAGNOSIS — B96.89 ACUTE VAGINITIS: ICD-10-CM

## 2022-08-29 LAB
C TRACH RRNA SPEC QL NAA+PROBE: NOT DETECTED
CANDIDA VAG CYTO: NOT DETECTED
CYTOLOGY CVX/VAG DOC THIN PREP: ABNORMAL
G VAGINALIS+PREV SP MTYP VAG QL MICRO: DETECTED
HPV HIGH+LOW RISK DNA PNL CVX: NOT DETECTED
N GONORRHOEA RRNA SPEC QL NAA+PROBE: NOT DETECTED
SOURCE AMPLIFICATION: NORMAL
T VAGINALIS VAG QL WET PREP: NOT DETECTED

## 2022-08-29 RX ORDER — METRONIDAZOLE 7.5 MG/G
0.75 GEL VAGINAL
Qty: 1 | Refills: 0 | Status: ACTIVE | COMMUNITY
Start: 2022-08-29 | End: 1900-01-01

## 2022-09-11 NOTE — ED ADULT NURSE NOTE - PSH
Physician Documentation                                                                           

 [facility]                                                                 

Name: Jh Fuentes                                                                               

Age: 18 months                                                                                    

Sex: Male                                                                                         

: 2021                                                                                   

MRN: [id number]                                                                                   

Arrival Date: 2022                                                                          

Time: 20:34                                                                                       

Account#: [id number]                                                                            

Bed 9                                                                                             

Private MD:                                                                                       

ED Physician Javid Ortiz                                                                         

HPI:                                                                                              

                                                                                             

22:29 This 18 months old Male presents to ER via [facility] with complaints of Cat Bite, Cough,  snw 

      Fever, Diarrhea.                                                                            

22:29 The patient was bitten on the right hand, by a cat, for an unknown reason, in an        snw 

      unprovoked manner, on a street or driveway. Onset: The symptoms/episode began/occurred      

      suddenly, yesterday. Animal information: Patient/Caregiver unable to provide                

      information related to the animal. Secondary to the bite the patient reports multiple       

      puncture wounds, that are deep. Associated signs and symptoms: The patient has no           

      apparent associated signs or symptoms. Severity of symptoms: in the emergency               

      department the symptoms area with some surrounding erythema. The patient has not            

      experienced similar symptoms in the past. It is unknown whether or not the patient has      

      recently seen a physician.                                                                  

                                                                                                  

Historical:                                                                                       

- Allergies:                                                                                      

20:49 No Known Allergies;                                                                     ha1 

- Home Meds:                                                                                      

20:49 None [Active];                                                                          ha1 

- PMHx:                                                                                           

20:49 None;                                                                                   ha1 

                                                                                                  

- Immunization history:: Childhood immunizations are not up to date.                              

                                                                                                  

                                                                                                  

ROS:                                                                                              

22:27 Constitutional: Negative for fever, chills, and weight loss, Eyes: Negative for injury, snw 

      pain, redness, and discharge, ENT: Negative for injury, pain, and discharge, positive       

      for congestion Neck: Negative for injury, pain, and swelling, Cardiovascular: Negative      

      for chest pain, palpitations, and edema, Abdomen/GI: Negative for abdominal pain,           

      nausea, vomiting, diarrhea, and constipation, Back: Negative for injury and pain, :       

      Negative for injury, bleeding, discharge, and swelling, MS/Extremity: Negative for          

      injury and deformity, Neuro: Negative for headache, weakness, numbness, tingling, and       

      seizure, Psych: Negative for depression, anxiety, suicide ideation, homicidal ideation,     

      and hallucinations.                                                                         

22:27 Respiratory: Positive for cough.                                                            

22:27 Skin: Positive for Cat bite to right hand yesterday in [place], [place]. Pt 's Mom        

      cleansed wound with alcohol and placed Neosporin on the wound. The cat ran away. Pt is      

      due one immunization.                                                                       

                                                                                                  

Exam:                                                                                             

21:49 Head/Face:  Normocephalic, atraumatic. Eyes:  Pupils equal round and reactive to light, snw 

      extra-ocular motions intact.  Lids and lashes normal.  Conjunctiva and sclera are           

      non-icteric and not injected.  Cornea within normal limits.  Periorbital areas with no      

      swelling, redness, or edema. ENT:  Nares patent. No nasal discharge, no septal              

      abnormalities noted.  Tympanic membranes are normal and external auditory canals are        

      clear.  Oropharynx with no redness, swelling, or masses, exudates, or evidence of           

      obstruction, uvula midline.  Mucous membranes moist. Neck:  Trachea midline, no             

      thyromegaly or masses palpated, and no cervical lymphadenopathy.  Supple, full range of     

      motion without nuchal rigidity, or vertebral point tenderness.  No Meningismus.             

      Chest/axilla:  Normal symmetrical motion.  No tenderness.  No crepitus.  No axillary        

      masses or tenderness. Cardiovascular:  Regular rate and rhythm with a normal S1 and S2.     

       No gallops, murmurs, or rubs.  Normal PMI, no JVD.  No pulse deficits.                     

21:49 Abdomen/GI:  Soft, non-tender with normal bowel sounds.  No distension, tympany or          

      bruits.  No guarding, rebound or rigidity.  No palpable masses or evidence of               

      tenderness with thorough palpation. Back:  No spinal tenderness.  No costovertebral         

      tenderness.  Full range of motion. MS/ Extremity:  Pulses equal, no cyanosis.               

      Neurovascular intact.  Full, normal range of motion. Neuro:  Awake and alert, GCS 15,       

      responds to parent.  Cranial nerves II-XII grossly intact.  Motor strength 5/5 in all       

      extremities.  Sensory grossly intact.  Cerebellar exam normal.  Normal tone. Psych:         

      Behavior, mood, response, and affect are appropriate for age.                               

21:49 Constitutional: The patient appears alert, awake, comfortable.                              

21:49 Respiratory: the patient does not display signs of respiratory distress,  Respirations:     

      shallow respirations, Breath sounds: bronchial sounds, that are moderate, are heard         

      diffusely, worse on the right.                                                              

21:49 Skin: Appearance: normal except for affected area, cellulitis, that is minimal, well        

      demarcated, on the  right hand s/p cat bite (in [place]) no info on cat.                

                                                                                                  

Vital Signs:                                                                                      

20:54 Temp 97.8(A); Weight 10.3 kg;                                                           jb4 

23:01 Pulse 127; Resp 24; Pulse Ox 98% ;                                                      hb  

                                                                                                  

MDM:                                                                                              

20:57 Patient medically screened.                                                             snw 

22:56 Data reviewed: vital signs, nurses notes. Data interpreted: Pulse oximetry: on room air snw 

      is 98 %. Interpretation: normal. Counseling: I had a detailed discussion with the           

      patient and/or guardian regarding: the historical points, exam findings, and any            

      diagnostic results supporting the discharge/admit diagnosis, lab results, the need for      

      outpatient follow up, for definitive care. Response to treatment: There is no               

      appreciated change of the patient's symptoms at this time. Special discussion: I            

      discussed in detail with the patient the higher chance of wound infection based on his      

      presenting history. Based on the history and exam findings, there is no indication for      

      further emergent testing or inpatient evaluation. I discussed with the patient/guardian     

      the need to see the pediatrician for further evaluation of the symptoms.                    

                                                                                                  

                                                                                             

21:12 Order name: Flu                                                                         snw 

                                                                                             

21:12 Order name: RSV                                                                         snw 

                                                                                             

21:12 Order name: SARS-COV-2 RT PCR (Document "Date of Onset" if Symptomatic)                 snw 

                                                                                             

22:31 Order name: SARS-COV-2 RT PCR                                                           EDMS

                                                                                             

22:51 Order name: Influenza Screen (A                                                         EDMS

                                                                                             

22:51 Order name: Respiratory Syncytial Virus Ag                                              EDMS

                                                                                                  

Administered Medications:                                                                         

No medications were administered                                                                  

                                                                                                  

                                                                                                  

Disposition:                                                                                      

                                                                                             

02:32 Co-signature as Attending Physician, Javid Ortiz DO I agree with the assessment and     ms3 

      plan of care.                                                                               

                                                                                                  

Disposition Summary:                                                                              

22 22:54                                                                                    

Discharge Ordered                                                                                 

      Location: Home                                                                          snw 

      Condition: Stable                                                                       snw 

      Diagnosis                                                                                   

        - Bitten by cat                                                                       snw 

        - Acute bronchiolitis, unspecified                                                    snw 

      Followup:                                                                               snw 

        - With: Private Physician                                                                  

        - When: 2 - 3 days                                                                         

        - Reason: Recheck today's complaints, Continuance of care, Re-evaluation by your           

      physician                                                                                   

      Followup:                                                                               snw 

        - With: Emergency Department                                                               

        - When: As needed                                                                          

        - Reason: Worsening of condition                                                           

      Discharge Instructions:                                                                     

        - Discharge Summary Sheet                                                             snw 

        - Bronchiolitis, Pediatric                                                            snw 

        - Ibuprofen Dosage Chart, Pediatric                                                   snw 

        - Acetaminophen Dosage Chart, Pediatric                                               snw 

        - Fever, Pediatric                                                                    snw 

        - Animal Bite, Pediatric                                                              snw 

      Forms:                                                                                      

        - Medication Reconciliation Form                                                      snw 

        - Thank You Letter                                                                    snw 

        - Antibiotic Education                                                                snw 

        - Prescription Opioid Use                                                             snw 

      Prescriptions:                                                                              

        - Augmentin ES-600 600-42.9 mg/5 mL Oral Suspension for Reconstitution                     

            - take 3.75 milliliters by ORAL route every 12 hours for 10 days For Acute Otitis snw 

      Media or Severe Infections; 75 milliliter; Refills: 0, Product Selection Permitted          

        - cetirizine 1 mg/mL Oral Solution                                                         

            - take 2.5 milliliters by ORAL route once daily; 52.5 milliliter; Refills: 0,     snw 

      Product Selection Permitted                                                                 

Signatures:                                                                                       

Dispatcher [employer]                           EDMS                                                 

Fransisca Saldaña, EMERITA-C                   FNP-Csnw                                                  

Javid Ortiz DO                        DO   ms3                                                  

Kristina Bell RN                        RN   ha1                                                  

                                                                                                  

Corrections: (The following items were deleted from the chart)                                    

                                                                                             

22:31 22:27 Skin: Positive for Cat bite to left hand yesterday in [place], [place]. Pt 's   snw 

      Mom cleansed wound with alcohol and placed Neosporin on the wound. The cat ran away. Pt     

      is due one immunization, snw                                                                

                                                                                                  

**************************************************************************************************
History of cervical discectomy  and fusion in 2007

## 2023-05-15 ENCOUNTER — APPOINTMENT (OUTPATIENT)
Dept: FAMILY MEDICINE | Facility: CLINIC | Age: 88
End: 2023-05-15

## 2023-06-20 NOTE — DISCHARGE NOTE ADULT - NS MD DC FALL RISK RISK
[facility]  CLINIC NOTE    Patient Name: Isaak Rossi  YOB: 1952  Chief Complaint: none (Pt here today for f/u visit. No stress noted at this time. )     PRESENTING HISTORY   History of Present Illness:  Mr. Isaak Rossi is a 70 y.o. male w/ PMH HTN, HLD, erectile dysfunction who comes to  Clinic today for follow up. Patient is doing well today and has no complaints. He has started smoking again. Quit around mothers day and starting back around Father's day. Smoking about 1 pack every 2 days.  He states he will try quitting but it is difficult when his nerves are bad. Today he reports urinary hesitancy and incomplete emptying x 1 month. States it only occur early in the morning. Denies dysuria or urinary frequency. He reports compliance with his medications.  He denies chest pain, shortness a breath, abdominal pain, melena, hematochezia, dysuria, hematuria, nausea/vomiting, fever/chills.         Review of Systems:  12 point review of symptoms negative unless otherwise stated above    PAST HISTORY:     Past Medical History:   Diagnosis Date    BPH (benign prostatic hyperplasia)     Chronic pancreatitis     Hyperlipidemia     Hyperlipidemia 6/20/2022    Hypertension         History reviewed. No pertinent surgical history.    History reviewed. No pertinent family history.    Social History     Socioeconomic History    Marital status: Single   Tobacco Use    Smoking status: Some Days     Types: Cigarettes    Smokeless tobacco: Never   Substance and Sexual Activity    Alcohol use: Not Currently    Drug use: Never     Social Determinants of Health     Financial Resource Strain: Low Risk     Difficulty of Paying Living Expenses: Not hard at all   Food Insecurity: No Food Insecurity    Worried About Running Out of Food in the Last Year: Never true    Ran Out of Food in the Last Year: Never true   Transportation Needs: No Transportation Needs    Lack of Transportation (Medical): No  "   Lack of Transportation (Non-Medical): No   Physical Activity: Sufficiently Active    Days of Exercise per Week: 3 days    Minutes of Exercise per Session: 60 min   Stress: No Stress Concern Present    Feeling of Stress : Not at all   Social Connections: Moderately Isolated    Frequency of Communication with Friends and Family: More than three times a week    Frequency of Social Gatherings with Friends and Family: Once a week    Attends Anabaptist Services: 1 to 4 times per year    Active Member of Clubs or Organizations: No    Attends Club or Organization Meetings: Never    Marital Status:    Housing Stability: Low Risk     Unable to Pay for Housing in the Last Year: No    Number of Places Lived in the Last Year: 1    Unstable Housing in the Last Year: No       Review of patient's allergies indicates:  No Known Allergies    MEDICATIONS:     Current Outpatient Medications   Medication Instructions    amLODIPine (NORVASC) 10 mg, Oral, Daily    aspirin (ECOTRIN) 81 mg, Oral    lisinopriL-hydrochlorothiazide (PRINZIDE,ZESTORETIC) 20-25 mg Tab 1 tablet, Oral, Daily    neomycin-polymyxin-dexamethasone (MAXITROL) 3.5mg/mL-10,000 unit/mL-0.1 % DrpS INSTILL ONE DROP IN THE LEFT EYE TWICE DAILY FOR 10 DAYS    nicotine polacrilex 2 mg, Oral, As needed (PRN)    pravastatin (PRAVACHOL) 40 mg, Oral, Nightly    vardenafiL (LEVITRA) 10 mg, Oral, As needed (PRN)        OBJECTIVE:   Vital Signs:  Vitals:    06/20/23 0806   BP: (!) 156/70   Pulse: (!) 57   Resp: 20   Temp: 98.4 °F (36.9 °C)   TempSrc: Oral   SpO2: 100%   Weight: 93.2 kg (205 lb 6.4 oz)   Height: 6' 1" (1.854 m)        Physical Exam:  General: NAD  Eye: PERRLA, EOMI, clear conjunctiva, eyelids normal  HENT: Head-normocephalic and atraumatic  Neck: full range of motion, no thyromegaly or lymphadenopathy, trachea midline, supple, no palpable thyroid nodules  Respiratory: clear to auscultation bilaterally without wheezes, rales, rhonchi  Cardiovascular: regular " rate and rhythm without murmurs.  No gallops or rubs no JVD.  Capillary refill within normal limits.  Gastrointestinal: soft, non-tender, non-distended with normal bowel sounds, without masses to palpation  Genitourinary: no CVA tenderness to palpation  Musculoskeletal: full range of motion of all extremities/spine without limitation or discomfort  Integumentary: no rashes or skin lesions present  Neurologic: no signs of peripheral neurological deficit, motor/sensory function intact  Psychiatric:  alert and oriented, cognitive function intact, cooperative with exam, good eye contact, judgement and insight intact, mood and affect full range.    Laboratory  Lab Results   Component Value Date     01/30/2023    K 4.6 01/30/2023    CO2 31 01/30/2023    BUN 12.9 01/30/2023    CREATININE 1.19 (H) 01/30/2023    CALCIUM 10.2 (H) 01/30/2023    BILIDIR 0.1 08/23/2021    IBILI 0.20 08/23/2021    ALKPHOS 78 01/30/2023    AST 23 01/30/2023    ALT 32 01/30/2023        Lab Results   Component Value Date    WBC 8.7 01/30/2023    RBC 4.98 01/30/2023    HGB 14.5 01/30/2023    HCT 44.7 01/30/2023    MCV 89.8 01/30/2023    MCH 29.1 01/30/2023    MCHC 32.4 (L) 01/30/2023    RDW 15.0 01/30/2023     01/30/2023    MPV 8.9 01/30/2023        Diagnostic Results:  No results found in the last 30 days.   No results found in the last 24 hours.     ASSESSMENT & PLAN:     HTN  -/62 , patient did not take antihypertensives this morning    -Continue HCTZ/Lisinopril 25-20mg and amlodipine 10 mg daily  --He now has a BP cuff, BP ranges 110-136/50-60s.     Hyperlipidemia  -Lipid panel within normal limits for over 1 year. Continue pravastatin 40 mg daily    Erectile Dysfunction  -Sent prescription for Vardenifil 10 mg     Urinary hesitancy   Urinary retention  - pt has been having symptoms for 1 month now, only occurs in the early morning   - PSA 4.4 1/2023  - UA today     Tobacco use  -Intermittently smokes throughout the year.  Counseled patient about benefits of quitting smoking permanently.  -Low-dose CT scan 3/5/2021 - BI-RADS 2 - benign appearance, f/u CT in 1 year. Ordered repeat Low dose CT today for 2023  --Prescribing nicotine lozenges 2 mg        Health Maintenance/ Wellness  Pneumococcal vaccine: PCV 13 in , PSV 23 in   TDAP: 2018  Influenza Vaccine: UTD  Zoster Vaccine: UTD  Colorectal cancer screening: Had colonoscopy done in [place] in . Cologuard negative 2022. Repeat .   Lung cancer screening: Low-dose CT 2022 - BI-RADS 2: benign appearance, f/u in 1 year. We will schedule follow-up CT on 2023  AAA U/S screenin/3/2023, no abdominal aortic aneurysm   Hepatitis Panel: Negative  COVID-19 Vaccine: Completed    Counseling:  - Patient instructed to limit alcohol use  - Patient counselled on smoking cessation  - Educated on diet (portion control) and exercise (at least 30 minutes per day)  - Relevant educational materials provided    Labs ordered: CMP, CBC, lipid panel, UA, a1c   Imaging: Low Dose CT  Medications: reconciled, discussed and refills given.  RTC in 6 months       Nereida Davison MD  2023, 8:56 AM        For information on Fall & Injury Prevention, visit [email or web address]

## 2023-08-06 ENCOUNTER — EMERGENCY (EMERGENCY)
Facility: HOSPITAL | Age: 88
LOS: 1 days | Discharge: HOME | End: 2023-08-06
Attending: EMERGENCY MEDICINE | Admitting: EMERGENCY MEDICINE
Payer: MEDICARE

## 2023-08-06 VITALS
WEIGHT: 130.07 LBS | HEIGHT: 63 IN | OXYGEN SATURATION: 98 % | RESPIRATION RATE: 14 BRPM | HEART RATE: 67 BPM | TEMPERATURE: 98 F | SYSTOLIC BLOOD PRESSURE: 129 MMHG | DIASTOLIC BLOOD PRESSURE: 74 MMHG

## 2023-08-06 DIAGNOSIS — Z98.89 OTHER SPECIFIED POSTPROCEDURAL STATES: Chronic | ICD-10-CM

## 2023-08-06 DIAGNOSIS — Z98.890 OTHER SPECIFIED POSTPROCEDURAL STATES: Chronic | ICD-10-CM

## 2023-08-06 DIAGNOSIS — D49.7 NEOPLASM OF UNSPECIFIED BEHAVIOR OF ENDOCRINE GLANDS AND OTHER PARTS OF NERVOUS SYSTEM: Chronic | ICD-10-CM

## 2023-08-06 PROCEDURE — 99284 EMERGENCY DEPT VISIT MOD MDM: CPT

## 2023-08-06 PROCEDURE — 74176 CT ABD & PELVIS W/O CONTRAST: CPT | Mod: 26,MA

## 2023-08-06 NOTE — ED ADULT NURSE NOTE - NSICDXPASTMEDICALHX_GEN_ALL_CORE_FT
PAST MEDICAL HISTORY:  Acromegaly     Calculus of gallbladder without cholecystitis without obstruction     Dyslipidemia     Hypertension     Murmur cardiac    Parotid mass left. s/p biopsy ("inconclusive) instructed to have repeat scan done in March 2015.being monitored by Dr. Sung Oscar (ENT)    Pituitary macroadenoma

## 2023-08-06 NOTE — ED ADULT NURSE NOTE - CHIEF COMPLAINT QUOTE
F: no IVF  E: K>4 Mg>2, monitor and replete as needed  N: diabetic diet  Ppx: SQH  D: RMF, awaiting placement at med-psych facility   FULL CODE LLQ abdominal pain x 3 days a/w nausea. pt reports constipation last week, last BM was last night

## 2023-08-06 NOTE — ED PROVIDER NOTE - NSFOLLOWUPINSTRUCTIONS_ED_ALL_ED_FT
-- You should update your primary care physician on your Emergency Department visit and follow up with them.  If you do not have a physician or have difficulty following up, please call: [phone] to obtain a [facility] doctor or specialist who can provide follow up.    -- You have a 3.5 cm right ovarian cyst that should be followed up with a gynecologist    -- Take miralax daily, one scoop in 6 oz of water or juice, for 5 days.    -- Take senna 2 tabs twice daily for 5 days.    -- Return to the ER for worsening or persistent symptoms, and/or ANY NEW OR CONCERNING SYMPTOMS.

## 2023-08-06 NOTE — ED ADULT NURSE NOTE - NSFALLHARMRISKINTERV_ED_ALL_ED

## 2023-08-06 NOTE — ED ADULT NURSE NOTE - OBJECTIVE STATEMENT
Pt came from home with c/o LLQ pain x 2 days. Pt also with c/o nausea- states that she has been unable to eat all day and has only been drinking water. Pts last BM was last night. Pt denies any pain at this time. No urinary symptoms.

## 2023-08-06 NOTE — ED PROVIDER NOTE - PHYSICAL EXAMINATION
Gen: alert, NAD  HEENT:  NC/AT, PERR, no exophthalmos  CV:  well perfused, rrr   Pulm:  normal RR, breathing comfortably, CTA b/l  Abd: s/nt/nd  MSK: moving all extremities  Neuro:  non-focal  Skin:  visualized areas intact  Psych: AOx3 None

## 2023-08-06 NOTE — ED PROVIDER NOTE - PATIENT PORTAL LINK FT
You can access the FollowMyHealth Patient Portal offered by [facility] by registering at the following website: [email or web address]. By joining [employer]’s FollowMyHealth portal, you will also be able to view your health information using other applications (apps) compatible with our system.

## 2023-08-06 NOTE — ED PROVIDER NOTE - OBJECTIVE STATEMENT
pt states that she has been having some constipation and intermittent L sided abd pain but does not have any pain now, no n/v. had a BM yesterday. denies any fever.

## 2023-08-07 VITALS
OXYGEN SATURATION: 97 % | RESPIRATION RATE: 14 BRPM | HEART RATE: 70 BPM | SYSTOLIC BLOOD PRESSURE: 133 MMHG | DIASTOLIC BLOOD PRESSURE: 80 MMHG

## 2023-08-07 PROCEDURE — 99284 EMERGENCY DEPT VISIT MOD MDM: CPT | Mod: 25

## 2023-08-07 PROCEDURE — 74176 CT ABD & PELVIS W/O CONTRAST: CPT | Mod: MA

## 2023-08-08 NOTE — ASU PREOP CHECKLIST - CHLOROHEXIDINE WASH 1
Managing Your Concussion    What is a concussion?  A concussion is a brain injury that happens when the brain is shaken inside the skull, causing changes in the brain. A concussion might happen as the result of a direct hit to the head to an indirect force such as whiplash. You may or may not lose consciousness.     How is a concussion diagnosed?  A concussion is diagnosed based on how the injury happened, your symptoms and a physical and neurologic examination.     What are the symptoms of a concussion?  Headache  Problems focusing or thinking  Difficulty remembering  Feeling tired or sleepy  Dizziness  Mental fog  Feeling slowed down  Sensitivity to light  Sensitivity to noise  Balance problems    How long is the recovery?  Most people get better from a concussion in 7 to 10 days. However, some people can take many weeks to months to recover. Previous concussion or mental health diagnoses can affect healing time. Anyone with a concussion should get treatment from a healthcare professional.     In the first 24-48 hours following the injury:  Do...  Use proper nutrition and hydration while healing from a brain injury  Sleep for at least 7-10 hours nightly, with the same bedtime and nap as needed  Control stress levels and if necessary perform abdominal breathing and/or meditation exercises.  Talk to your healthcare provider about attending school and/or work  Don't...  Take anti-inflammatory medications such as ibuprofen, aleve, etc.   Drink alcohol or caffeine or smoke  Take naps after 2 pm  Use electronics such as cell phones, television, computers and video games  Drive or operate machinery  Participate in any activities that make your symptoms worse  Contact your doctor for medication, therapies and other options that may help your sleep and brain function.    Should I go to therapy?  If symptoms are severe or not improving you may be referred to physical, oculomotor or speech therapy. Therapy will focus on  helping with your balance, vision, dizziness, neck pain, headaches, cognition and memory strategies as well as returning to school and other activities.     When can I return to my normal activities?  Your doctor will decide when you can return to activities based on your symptoms and ability to return to school or work without difficulty. A slow, gradual return to sports guided by an  may be recommended.     Seek additional medical help right away by contacting your doctor or emergency department if you have an of the following symptoms:  Repeated vomiting  Worsening headache  Weakness or numbness in arms or legs  Difficulty recognizing people or places  Unusual behavior (confused and/or short tempered)  Slurred speech  Drowsy or difficulty waking up  Seizures  Different sized pupils                       23-Jul-2020 08:30

## 2023-08-08 NOTE — CHART NOTE - NSCHARTNOTEFT_GEN_A_CORE
SW placed call to patient to discuss and assist with follow up care.  Patient presented to ED on 8/6/23 due to abdominal pain.  Patient reports she reached out to PMD office to schedule follow up with Dr Latif and is awaiting a call back.  SW will follow to confirm appointment was scheduled.  ISAAC spoke with patient regarding follow up with gynecologist and offered to schedule, but patient declined at this time.

## 2023-08-09 ENCOUNTER — APPOINTMENT (OUTPATIENT)
Dept: FAMILY MEDICINE | Facility: CLINIC | Age: 88
End: 2023-08-09
Payer: MEDICARE

## 2023-08-09 VITALS
RESPIRATION RATE: 14 BRPM | HEIGHT: 62 IN | BODY MASS INDEX: 23.92 KG/M2 | OXYGEN SATURATION: 98 % | HEART RATE: 76 BPM | SYSTOLIC BLOOD PRESSURE: 116 MMHG | TEMPERATURE: 97.6 F | WEIGHT: 130 LBS | DIASTOLIC BLOOD PRESSURE: 72 MMHG

## 2023-08-09 DIAGNOSIS — R53.83 OTHER FATIGUE: ICD-10-CM

## 2023-08-09 DIAGNOSIS — Z01.89 ENCOUNTER FOR OTHER SPECIFIED SPECIAL EXAMINATIONS: ICD-10-CM

## 2023-08-09 PROCEDURE — 99214 OFFICE O/P EST MOD 30 MIN: CPT

## 2023-08-09 NOTE — HISTORY OF PRESENT ILLNESS
[FreeTextEntry8] : Sunday night went to ER at [place], last week was constipated. Took MOM two Sundays ago. Thursday of last week to MOM (milk of magnesia). Had pain and went to ER. Stool palpable on exam in ER. Had CT. +nausea. Given senna and miralax from ER x 5 days, took all the medication. Had a 'partial" BM this morning. Then "little bits" of diarrhea today.   ROS: negative except as noted above

## 2023-08-10 ENCOUNTER — NON-APPOINTMENT (OUTPATIENT)
Age: 88
End: 2023-08-10

## 2023-08-10 LAB
FOLATE SERPL-MCNC: >20 NG/ML
VIT B12 SERPL-MCNC: 577 PG/ML

## 2023-10-06 ENCOUNTER — EMERGENCY (EMERGENCY)
Facility: HOSPITAL | Age: 88
LOS: 1 days | Discharge: HOME | End: 2023-10-06
Attending: EMERGENCY MEDICINE | Admitting: EMERGENCY MEDICINE
Payer: MEDICARE

## 2023-10-06 VITALS
HEART RATE: 63 BPM | OXYGEN SATURATION: 98 % | SYSTOLIC BLOOD PRESSURE: 158 MMHG | RESPIRATION RATE: 18 BRPM | DIASTOLIC BLOOD PRESSURE: 90 MMHG

## 2023-10-06 VITALS
HEART RATE: 63 BPM | DIASTOLIC BLOOD PRESSURE: 84 MMHG | TEMPERATURE: 98 F | RESPIRATION RATE: 19 BRPM | SYSTOLIC BLOOD PRESSURE: 145 MMHG | OXYGEN SATURATION: 98 % | WEIGHT: 130.07 LBS | HEIGHT: 63 IN

## 2023-10-06 DIAGNOSIS — D49.7 NEOPLASM OF UNSPECIFIED BEHAVIOR OF ENDOCRINE GLANDS AND OTHER PARTS OF NERVOUS SYSTEM: Chronic | ICD-10-CM

## 2023-10-06 DIAGNOSIS — Z98.89 OTHER SPECIFIED POSTPROCEDURAL STATES: Chronic | ICD-10-CM

## 2023-10-06 DIAGNOSIS — Z98.890 OTHER SPECIFIED POSTPROCEDURAL STATES: Chronic | ICD-10-CM

## 2023-10-06 LAB
ALBUMIN SERPL ELPH-MCNC: 3.5 G/DL — SIGNIFICANT CHANGE UP (ref 3.3–5)
ALP SERPL-CCNC: 106 U/L — SIGNIFICANT CHANGE UP (ref 40–120)
ALT FLD-CCNC: 27 U/L — SIGNIFICANT CHANGE UP (ref 10–45)
ANION GAP SERPL CALC-SCNC: 8 MMOL/L — SIGNIFICANT CHANGE UP (ref 5–17)
AST SERPL-CCNC: 27 U/L — SIGNIFICANT CHANGE UP (ref 10–40)
BILIRUB SERPL-MCNC: 0.9 MG/DL — SIGNIFICANT CHANGE UP (ref 0.2–1.2)
BUN SERPL-MCNC: 23 MG/DL — SIGNIFICANT CHANGE UP (ref 7–23)
CALCIUM SERPL-MCNC: 9.6 MG/DL — SIGNIFICANT CHANGE UP (ref 8.4–10.5)
CHLORIDE SERPL-SCNC: 104 MMOL/L — SIGNIFICANT CHANGE UP (ref 96–108)
CO2 SERPL-SCNC: 28 MMOL/L — SIGNIFICANT CHANGE UP (ref 22–31)
CREAT SERPL-MCNC: 0.67 MG/DL — SIGNIFICANT CHANGE UP (ref 0.5–1.3)
EGFR: 84 ML/MIN/1.73M2 — SIGNIFICANT CHANGE UP
GLUCOSE SERPL-MCNC: 94 MG/DL — SIGNIFICANT CHANGE UP (ref 70–99)
HCT VFR BLD CALC: 38.8 % — SIGNIFICANT CHANGE UP (ref 34.5–45)
HGB BLD-MCNC: 12.8 G/DL — SIGNIFICANT CHANGE UP (ref 11.5–15.5)
MCHC RBC-ENTMCNC: 31.1 PG — SIGNIFICANT CHANGE UP (ref 27–34)
MCHC RBC-ENTMCNC: 33 GM/DL — SIGNIFICANT CHANGE UP (ref 32–36)
MCV RBC AUTO: 94.4 FL — SIGNIFICANT CHANGE UP (ref 80–100)
NRBC # BLD: 0 /100 WBCS — SIGNIFICANT CHANGE UP (ref 0–0)
PLATELET # BLD AUTO: 214 K/UL — SIGNIFICANT CHANGE UP (ref 150–400)
POTASSIUM SERPL-MCNC: 3.7 MMOL/L — SIGNIFICANT CHANGE UP (ref 3.5–5.3)
POTASSIUM SERPL-SCNC: 3.7 MMOL/L — SIGNIFICANT CHANGE UP (ref 3.5–5.3)
PROT SERPL-MCNC: 7.3 G/DL — SIGNIFICANT CHANGE UP (ref 6–8.3)
RBC # BLD: 4.11 M/UL — SIGNIFICANT CHANGE UP (ref 3.8–5.2)
RBC # FLD: 13.1 % — SIGNIFICANT CHANGE UP (ref 10.3–14.5)
SODIUM SERPL-SCNC: 140 MMOL/L — SIGNIFICANT CHANGE UP (ref 135–145)
WBC # BLD: 7.99 K/UL — SIGNIFICANT CHANGE UP (ref 3.8–10.5)
WBC # FLD AUTO: 7.99 K/UL — SIGNIFICANT CHANGE UP (ref 3.8–10.5)

## 2023-10-06 PROCEDURE — 74177 CT ABD & PELVIS W/CONTRAST: CPT | Mod: 26,MA

## 2023-10-06 PROCEDURE — 80053 COMPREHEN METABOLIC PANEL: CPT

## 2023-10-06 PROCEDURE — 71260 CT THORAX DX C+: CPT | Mod: 26,MA

## 2023-10-06 PROCEDURE — 96374 THER/PROPH/DIAG INJ IV PUSH: CPT | Mod: XU

## 2023-10-06 PROCEDURE — 85027 COMPLETE CBC AUTOMATED: CPT

## 2023-10-06 PROCEDURE — 74177 CT ABD & PELVIS W/CONTRAST: CPT | Mod: MA

## 2023-10-06 PROCEDURE — 99284 EMERGENCY DEPT VISIT MOD MDM: CPT | Mod: 25

## 2023-10-06 PROCEDURE — 99285 EMERGENCY DEPT VISIT HI MDM: CPT

## 2023-10-06 PROCEDURE — 71260 CT THORAX DX C+: CPT | Mod: MA

## 2023-10-06 PROCEDURE — 36415 COLL VENOUS BLD VENIPUNCTURE: CPT

## 2023-10-06 RX ORDER — OXYCODONE AND ACETAMINOPHEN 5; 325 MG/1; MG/1
1 TABLET ORAL
Qty: 12 | Refills: 0
Start: 2023-10-06 | End: 2023-10-08

## 2023-10-06 RX ORDER — ACETAMINOPHEN 500 MG
1000 TABLET ORAL ONCE
Refills: 0 | Status: COMPLETED | OUTPATIENT
Start: 2023-10-06 | End: 2023-10-06

## 2023-10-06 RX ADMIN — Medication 400 MILLIGRAM(S): at 17:00

## 2023-10-06 NOTE — ED PROVIDER NOTE - OBJECTIVE STATEMENT
89 year old female with right flank/hip pain s/p fall 3 days. Denies hitting her head.  Denies fever, NVD, took ibuprofen with minimal relief.

## 2023-10-06 NOTE — ED ADULT NURSE NOTE - NSFALLUNIVINTERV_ED_ALL_ED
Bed/Stretcher in lowest position, wheels locked, appropriate side rails in place/Call bell, personal items and telephone in reach/Instruct patient to call for assistance before getting out of bed/chair/stretcher/Non-slip footwear applied when patient is off stretcher/[place] to call system/Physically safe environment - no spills, clutter or unnecessary equipment/Purposeful proactive rounding/Room/bathroom lighting operational, light cord in reach

## 2023-10-06 NOTE — ED PROVIDER NOTE - PATIENT PORTAL LINK FT
You can access the FollowMyHealth Patient Portal offered by [facility] by registering at the following website: [email or web address]. By joining [employer]’s FollowMyHealth portal, you will also be able to view your health information using other applications (apps) compatible with our system.

## 2023-10-06 NOTE — ED PROVIDER NOTE - CLINICAL SUMMARY MEDICAL DECISION MAKING FREE TEXT BOX
89 year old female with rt back/flank pain s/p fall, ct showed compression fx of T9,10 likely not acute, spoke with ortho on call- no acute intervention needed at this point, likely chronic, pain control, and outpatient f/u.     Patient's pain is better controlled with percocet, shared decision making that patient is rather be discharged with pain meds, and set up her own spine appointment with her surgeon who already did neck fusion in the past. Patient is able to ambulate with minimal assistance, dc with percocet, f/u with spine early next week, return precaution given

## 2023-10-06 NOTE — ED ADULT TRIAGE NOTE - CHIEF COMPLAINT QUOTE
Patient BIB son for fall on Thursday c/o back pain. Patient denies anticoagulation use, LOC, headache, dizziness and blurry vision.

## 2023-10-06 NOTE — ED PROVIDER NOTE - NSFOLLOWUPINSTRUCTIONS_ED_ALL_ED_FT
Please follow up with your spine doctor next week.  Return to the nearest Emergency Medicine Department if loss of control of urine, intractable pain, loss of sensation on your leg/thigh

## 2023-10-06 NOTE — ED PROVIDER NOTE - MUSCULOSKELETAL, MLM
Spine appears normal, range of motion is limited due to pain, tenderness to the right lower rib cage/flank

## 2023-10-09 ENCOUNTER — APPOINTMENT (OUTPATIENT)
Dept: FAMILY MEDICINE | Facility: CLINIC | Age: 88
End: 2023-10-09
Payer: MEDICARE

## 2023-10-09 VITALS
HEART RATE: 59 BPM | RESPIRATION RATE: 14 BRPM | OXYGEN SATURATION: 96 % | SYSTOLIC BLOOD PRESSURE: 150 MMHG | TEMPERATURE: 97.7 F | DIASTOLIC BLOOD PRESSURE: 71 MMHG

## 2023-10-09 DIAGNOSIS — M54.9 DORSALGIA, UNSPECIFIED: ICD-10-CM

## 2023-10-09 PROCEDURE — 99214 OFFICE O/P EST MOD 30 MIN: CPT

## 2023-10-09 NOTE — CHART NOTE - NSCHARTNOTEFT_GEN_A_CORE
Patient presenting to [facility] ED complaining of fall and back pain.  SW spoke with patient at home, introduced self and role.  Patient was complaining of back pain and asked for assistance getting pain medication.  SW scheduled a same day appointment with Family Practice (10/9), however, patient stated that she is unable to attend as she cannot ambulate.  Patient was advised to return to the ER if the pain is unbearable.  As per patient she required immediate attention.  Information was provided for assistance if required.

## 2023-10-10 PROBLEM — M54.9 ACUTE MID BACK PAIN: Status: ACTIVE | Noted: 2023-10-09

## 2023-10-10 RX ORDER — MELOXICAM 10 MG/1
10 CAPSULE ORAL DAILY
Qty: 7 | Refills: 0 | Status: DISCONTINUED | COMMUNITY
Start: 2023-10-09 | End: 2023-10-10

## 2023-10-13 DIAGNOSIS — M54.50 LOW BACK PAIN, UNSPECIFIED: ICD-10-CM

## 2023-10-13 RX ORDER — MELOXICAM 7.5 MG/1
7.5 TABLET ORAL TWICE DAILY
Qty: 60 | Refills: 0 | Status: DISCONTINUED | COMMUNITY
Start: 2022-07-07 | End: 2023-10-13

## 2023-10-19 DIAGNOSIS — I35.1 NONRHEUMATIC AORTIC (VALVE) INSUFFICIENCY: ICD-10-CM

## 2023-10-31 ENCOUNTER — APPOINTMENT (OUTPATIENT)
Dept: CARDIOLOGY | Facility: CLINIC | Age: 88
End: 2023-10-31
Payer: MEDICARE

## 2023-10-31 ENCOUNTER — NON-APPOINTMENT (OUTPATIENT)
Age: 88
End: 2023-10-31

## 2023-10-31 VITALS
HEART RATE: 68 BPM | OXYGEN SATURATION: 98 % | BODY MASS INDEX: 23.19 KG/M2 | SYSTOLIC BLOOD PRESSURE: 138 MMHG | HEIGHT: 62 IN | WEIGHT: 126 LBS | RESPIRATION RATE: 18 BRPM | TEMPERATURE: 95.2 F | DIASTOLIC BLOOD PRESSURE: 63 MMHG

## 2023-10-31 PROCEDURE — 99204 OFFICE O/P NEW MOD 45 MIN: CPT

## 2023-10-31 PROCEDURE — 93000 ELECTROCARDIOGRAM COMPLETE: CPT

## 2023-11-02 ENCOUNTER — NON-APPOINTMENT (OUTPATIENT)
Age: 88
End: 2023-11-02

## 2023-11-30 ENCOUNTER — APPOINTMENT (OUTPATIENT)
Dept: CARDIOLOGY | Facility: CLINIC | Age: 88
End: 2023-11-30
Payer: MEDICARE

## 2023-11-30 PROCEDURE — 93306 TTE W/DOPPLER COMPLETE: CPT

## 2024-01-09 DIAGNOSIS — E55.9 VITAMIN D DEFICIENCY, UNSPECIFIED: ICD-10-CM

## 2024-01-11 ENCOUNTER — LABORATORY RESULT (OUTPATIENT)
Age: 89
End: 2024-01-11

## 2024-01-25 ENCOUNTER — NON-APPOINTMENT (OUTPATIENT)
Age: 89
End: 2024-01-25

## 2024-02-15 ENCOUNTER — APPOINTMENT (OUTPATIENT)
Dept: HOME HEALTH SERVICES | Facility: HOME HEALTH | Age: 89
End: 2024-02-15
Payer: MEDICARE

## 2024-02-15 VITALS
TEMPERATURE: 96.6 F | HEART RATE: 61 BPM | DIASTOLIC BLOOD PRESSURE: 60 MMHG | SYSTOLIC BLOOD PRESSURE: 126 MMHG | OXYGEN SATURATION: 99 %

## 2024-02-15 VITALS — WEIGHT: 125 LBS | BODY MASS INDEX: 22.15 KG/M2 | HEIGHT: 63 IN

## 2024-02-15 DIAGNOSIS — M54.50 LOW BACK PAIN, UNSPECIFIED: ICD-10-CM

## 2024-02-15 DIAGNOSIS — I35.0 NONRHEUMATIC AORTIC (VALVE) STENOSIS: ICD-10-CM

## 2024-02-15 DIAGNOSIS — N94.9 UNSPECIFIED CONDITION ASSOCIATED WITH FEMALE GENITAL ORGANS AND MENSTRUAL CYCLE: ICD-10-CM

## 2024-02-15 DIAGNOSIS — R26.81 UNSTEADINESS ON FEET: ICD-10-CM

## 2024-02-15 DIAGNOSIS — Z87.19 PERSONAL HISTORY OF OTHER DISEASES OF THE DIGESTIVE SYSTEM: ICD-10-CM

## 2024-02-15 DIAGNOSIS — G89.29 LOW BACK PAIN, UNSPECIFIED: ICD-10-CM

## 2024-02-15 DIAGNOSIS — Z71.89 OTHER SPECIFIED COUNSELING: ICD-10-CM

## 2024-02-15 PROCEDURE — 99345 HOME/RES VST NEW HIGH MDM 75: CPT

## 2024-02-15 NOTE — PHYSICAL EXAM
[No Acute Distress] : no acute distress [Normal Sclera/Conjunctiva] : normal sclera/conjunctiva [Normal Outer Ear/Nose] : the ears and nose were normal in appearance [Normal Oropharynx] : the oropharynx was normal [No JVD] : no jugular venous distention [Supple] : the neck was supple [No Respiratory Distress] : no respiratory distress [Breast Exam Declined] : patient declined to have breast exam done [Normal Bowel Sounds] : normal bowel sounds [Non Tender] : non-tender [Soft] : abdomen soft [Not Distended] : not distended [Patient Refused] : rectal exam was refused by the patient [No CVA Tenderness] : no ~M costovertebral angle tenderness [No Spinal Tenderness] : no spinal tenderness [No Skin Lesions] : no skin lesions [No Motor Deficits] : the motor exam was normal [No Gross Sensory Deficits] : no gross sensory deficits [Oriented x3] : oriented to person, place, and time [Normal Affect] : the affect was normal [Normal Mood] : the mood was normal [Normal Insight/Judgement] : insight and judgment were intact [de-identified] : robust  engaging  conversant  and cooperative   looking younger than stated age  [de-identified] : wears  glasses [de-identified] : 2/6  [de-identified] : antalgic  unsteady  decreased  strength and tone [de-identified] : elongated  dystrophic brittle nails  on right foot

## 2024-02-15 NOTE — CHRONIC CARE ASSESSMENT
[Patient Non-adherent to care plan] : patient non-adherent to care plan [Less than 3 Times Per Week] : exercises less than 3 times per week [< 30 Minutes/Session] : (< 30 minutes per session) [Strength Training] : strength training [Low Salt Diet] : low salt [General Adherence] : and is generally adherent [PPS Score: ____] : Palliative Performance Scale (PPS) Score: [unfilled] [FAST Score: ____] : Functional Assessment Scale (FAST) Score: [unfilled] [Class I] : [place] Heart Association Class Output: Class I

## 2024-02-15 NOTE — DATA REVIEWED
[FreeTextEntry1] : all  blood work  urine culture  and radiology reviewed  discussed with patient /caretakers   at length and in detail

## 2024-02-15 NOTE — REVIEW OF SYSTEMS
[Vision Problems] : vision problems [Constipation] : constipation [Incontinence] : incontinence [Joint Stiffness] : joint stiffness [Joint Swelling] : joint swelling [Nail Changes] : nail changes [Unsteady Walking] : ataxia [Negative] : Heme/Lymph [Shortness Of Breath] : no shortness of breath [Abdominal Pain] : no abdominal pain [Memory Loss] : no memory loss [Muscle Weakness] : no muscle weakness [FreeTextEntry3] : macular degeneration

## 2024-02-15 NOTE — COUNSELING
[Normal Weight - ( BMI  <25 )] : normal weight - ( BMI  <25 ) [Mediterranean diet recommended] : Mediterranean diet recommended [Hypertension self management education material provided] : hypertension self management education material provided [Non - Smoker] : non-smoker [Smoke/CO Detectors] : smoke/CO detectors [Use grab bars] : use grab bars [Use assistive device to avoid falls] : use assistive device to avoid falls [Remove clutter and unsafe carpeting to avoid falls] : remove clutter and unsafe carpeting to avoid falls [] : cervical cancer screening [Completed] : Aspirin use discussion completed [Improve exercise tolerance] : improve exercise tolerance [Improve mobility] : improve mobility [Improve weight] : improve weight [Improve pain control] : improve pain control [Minimize unnecessary interventions] : minimize unnecessary interventions [Maintain functional ability] : maintain functional ability [Discussed disease trajectory with patient/caregiver] : discussed disease trajectory with patient/caregiver [Likely to achieve goals/desired outcomes] : likely to achieve goals/desired outcomes [Patient/Caregiver has ___ understanding of disease process] : patient/caregiver has [unfilled] understanding of disease process [Completed Medical Orders for Life-Sustaining Treatment] : completed medical orders for life-sustaining treatment [DNR] : Code Status: DNR [Limited] : Treatment Guidelines: Limited [DNI] : Intubation: DNI [[facility] Date: _____] : [facility]last [facility]: [unfilled] [ - New patient with 2 or more chronic conditions; CCM discussed and patient-centered care plan established] : New patient with 2 or more chronic conditions; CCM discussed and patient-centered care plan established

## 2024-02-15 NOTE — HISTORY OF PRESENT ILLNESS
[Patient] : patient [FreeTextEntry1] : gait instablity  [FreeTextEntry2] : patient is seen today for initial visit and enrollment into  a house call  program along with care manager Brian  patient is homebound due  gait instability   most of the history obtained from patient     3 childern  involved   Past medical history include   HTN falls   patient  still follows with   specialists  cardiologist  last hospitalization  diet regular  appetite   good   dysphagia none  Weight stable  constipation episodic   incontinence none  pressure/bed sores none  Ambulates with rolling walker  falls yes none recently  Behavior good  Mood  ok   memory  good   sleep   poor  melatonin did  not work  Advised on good sleeping habits  Use of  tryptophan sleepy time times   Quiet routine before sleep time Calming teas  sensory deficits  has macular  degeneration  pain denies now  Medication refills done and reconciliation  done  Goals of care discussed and completed

## 2024-04-12 ENCOUNTER — NON-APPOINTMENT (OUTPATIENT)
Age: 89
End: 2024-04-12

## 2024-04-14 ENCOUNTER — INPATIENT (INPATIENT)
Facility: HOSPITAL | Age: 89
LOS: 2 days | Discharge: NURSING, REHAB OR LONG-TERM CARE | DRG: 552 | End: 2024-04-17
Attending: FAMILY MEDICINE | Admitting: HOSPITALIST
Payer: MEDICARE

## 2024-04-14 ENCOUNTER — TRANSCRIPTION ENCOUNTER (OUTPATIENT)
Age: 89
End: 2024-04-14

## 2024-04-14 VITALS
HEART RATE: 70 BPM | RESPIRATION RATE: 18 BRPM | DIASTOLIC BLOOD PRESSURE: 74 MMHG | SYSTOLIC BLOOD PRESSURE: 142 MMHG | TEMPERATURE: 98 F | WEIGHT: 125 LBS | OXYGEN SATURATION: 97 % | HEIGHT: 63 IN

## 2024-04-14 DIAGNOSIS — M54.16 RADICULOPATHY, LUMBAR REGION: ICD-10-CM

## 2024-04-14 DIAGNOSIS — Z98.890 OTHER SPECIFIED POSTPROCEDURAL STATES: Chronic | ICD-10-CM

## 2024-04-14 DIAGNOSIS — Z98.89 OTHER SPECIFIED POSTPROCEDURAL STATES: Chronic | ICD-10-CM

## 2024-04-14 DIAGNOSIS — D49.7 NEOPLASM OF UNSPECIFIED BEHAVIOR OF ENDOCRINE GLANDS AND OTHER PARTS OF NERVOUS SYSTEM: Chronic | ICD-10-CM

## 2024-04-14 LAB
ALBUMIN SERPL ELPH-MCNC: 3 G/DL — LOW (ref 3.3–5)
ALP SERPL-CCNC: 122 U/L — HIGH (ref 40–120)
ALT FLD-CCNC: 22 U/L — SIGNIFICANT CHANGE UP (ref 10–45)
ANION GAP SERPL CALC-SCNC: 10 MMOL/L — SIGNIFICANT CHANGE UP (ref 5–17)
AST SERPL-CCNC: 20 U/L — SIGNIFICANT CHANGE UP (ref 10–40)
BILIRUB SERPL-MCNC: 1 MG/DL — SIGNIFICANT CHANGE UP (ref 0.2–1.2)
BUN SERPL-MCNC: 27 MG/DL — HIGH (ref 7–23)
CALCIUM SERPL-MCNC: 9.2 MG/DL — SIGNIFICANT CHANGE UP (ref 8.4–10.5)
CHLORIDE SERPL-SCNC: 103 MMOL/L — SIGNIFICANT CHANGE UP (ref 96–108)
CO2 SERPL-SCNC: 27 MMOL/L — SIGNIFICANT CHANGE UP (ref 22–31)
CREAT SERPL-MCNC: 0.82 MG/DL — SIGNIFICANT CHANGE UP (ref 0.5–1.3)
EGFR: 68 ML/MIN/1.73M2 — SIGNIFICANT CHANGE UP
GLUCOSE SERPL-MCNC: 91 MG/DL — SIGNIFICANT CHANGE UP (ref 70–99)
HCT VFR BLD CALC: 36.6 % — SIGNIFICANT CHANGE UP (ref 34.5–45)
HGB BLD-MCNC: 12.7 G/DL — SIGNIFICANT CHANGE UP (ref 11.5–15.5)
MCHC RBC-ENTMCNC: 31.8 PG — SIGNIFICANT CHANGE UP (ref 27–34)
MCHC RBC-ENTMCNC: 34.7 GM/DL — SIGNIFICANT CHANGE UP (ref 32–36)
MCV RBC AUTO: 91.7 FL — SIGNIFICANT CHANGE UP (ref 80–100)
NRBC # BLD: 0 /100 WBCS — SIGNIFICANT CHANGE UP (ref 0–0)
PLATELET # BLD AUTO: 262 K/UL — SIGNIFICANT CHANGE UP (ref 150–400)
POTASSIUM SERPL-MCNC: 4.2 MMOL/L — SIGNIFICANT CHANGE UP (ref 3.5–5.3)
POTASSIUM SERPL-SCNC: 4.2 MMOL/L — SIGNIFICANT CHANGE UP (ref 3.5–5.3)
PROT SERPL-MCNC: 6.6 G/DL — SIGNIFICANT CHANGE UP (ref 6–8.3)
RBC # BLD: 3.99 M/UL — SIGNIFICANT CHANGE UP (ref 3.8–5.2)
RBC # FLD: 12.8 % — SIGNIFICANT CHANGE UP (ref 10.3–14.5)
SODIUM SERPL-SCNC: 140 MMOL/L — SIGNIFICANT CHANGE UP (ref 135–145)
WBC # BLD: 9.03 K/UL — SIGNIFICANT CHANGE UP (ref 3.8–10.5)
WBC # FLD AUTO: 9.03 K/UL — SIGNIFICANT CHANGE UP (ref 3.8–10.5)

## 2024-04-14 PROCEDURE — 99223 1ST HOSP IP/OBS HIGH 75: CPT

## 2024-04-14 PROCEDURE — 99285 EMERGENCY DEPT VISIT HI MDM: CPT

## 2024-04-14 PROCEDURE — 72131 CT LUMBAR SPINE W/O DYE: CPT | Mod: 26,MC

## 2024-04-14 PROCEDURE — 93010 ELECTROCARDIOGRAM REPORT: CPT

## 2024-04-14 RX ORDER — IBUPROFEN 200 MG
400 TABLET ORAL ONCE
Refills: 0 | Status: COMPLETED | OUTPATIENT
Start: 2024-04-14 | End: 2024-04-14

## 2024-04-14 RX ORDER — ACETAMINOPHEN 500 MG
975 TABLET ORAL ONCE
Refills: 0 | Status: COMPLETED | OUTPATIENT
Start: 2024-04-14 | End: 2024-04-14

## 2024-04-14 RX ORDER — ENOXAPARIN SODIUM 100 MG/ML
40 INJECTION SUBCUTANEOUS EVERY 24 HOURS
Refills: 0 | Status: DISCONTINUED | OUTPATIENT
Start: 2024-04-14 | End: 2024-04-17

## 2024-04-14 RX ORDER — ACETAMINOPHEN 500 MG
1000 TABLET ORAL ONCE
Refills: 0 | Status: COMPLETED | OUTPATIENT
Start: 2024-04-14 | End: 2024-04-14

## 2024-04-14 RX ORDER — FAMOTIDINE 10 MG/ML
20 INJECTION INTRAVENOUS
Qty: 0 | Refills: 0 | DISCHARGE

## 2024-04-14 RX ORDER — SODIUM CHLORIDE 9 MG/ML
500 INJECTION INTRAMUSCULAR; INTRAVENOUS; SUBCUTANEOUS ONCE
Refills: 0 | Status: COMPLETED | OUTPATIENT
Start: 2024-04-14 | End: 2024-04-14

## 2024-04-14 RX ORDER — DIAZEPAM 5 MG
5 TABLET ORAL ONCE
Refills: 0 | Status: DISCONTINUED | OUTPATIENT
Start: 2024-04-14 | End: 2024-04-14

## 2024-04-14 RX ORDER — CALCIUM CARBONATE 500(1250)
1 TABLET ORAL
Refills: 0 | Status: DISCONTINUED | OUTPATIENT
Start: 2024-04-14 | End: 2024-04-17

## 2024-04-14 RX ORDER — INFLUENZA VIRUS VACCINE 15; 15; 15; 15 UG/.5ML; UG/.5ML; UG/.5ML; UG/.5ML
0.7 SUSPENSION INTRAMUSCULAR ONCE
Refills: 0 | Status: DISCONTINUED | OUTPATIENT
Start: 2024-04-14 | End: 2024-04-17

## 2024-04-14 RX ORDER — CHOLECALCIFEROL (VITAMIN D3) 125 MCG
800 CAPSULE ORAL DAILY
Refills: 0 | Status: DISCONTINUED | OUTPATIENT
Start: 2024-04-14 | End: 2024-04-17

## 2024-04-14 RX ORDER — ATORVASTATIN CALCIUM 80 MG/1
20 TABLET, FILM COATED ORAL AT BEDTIME
Refills: 0 | Status: DISCONTINUED | OUTPATIENT
Start: 2024-04-14 | End: 2024-04-17

## 2024-04-14 RX ORDER — LISINOPRIL 2.5 MG/1
10 TABLET ORAL DAILY
Refills: 0 | Status: DISCONTINUED | OUTPATIENT
Start: 2024-04-14 | End: 2024-04-17

## 2024-04-14 RX ORDER — LEVOTHYROXINE SODIUM 125 MCG
50 TABLET ORAL DAILY
Refills: 0 | Status: DISCONTINUED | OUTPATIENT
Start: 2024-04-14 | End: 2024-04-17

## 2024-04-14 RX ORDER — LIDOCAINE 4 G/100G
1 CREAM TOPICAL ONCE
Refills: 0 | Status: COMPLETED | OUTPATIENT
Start: 2024-04-14 | End: 2024-04-14

## 2024-04-14 RX ADMIN — Medication 975 MILLIGRAM(S): at 13:54

## 2024-04-14 RX ADMIN — SODIUM CHLORIDE 500 MILLILITER(S): 9 INJECTION INTRAMUSCULAR; INTRAVENOUS; SUBCUTANEOUS at 17:35

## 2024-04-14 RX ADMIN — Medication 975 MILLIGRAM(S): at 14:54

## 2024-04-14 RX ADMIN — Medication 400 MILLIGRAM(S): at 14:54

## 2024-04-14 RX ADMIN — LIDOCAINE 1 PATCH: 4 CREAM TOPICAL at 13:54

## 2024-04-14 RX ADMIN — Medication 400 MILLIGRAM(S): at 18:38

## 2024-04-14 RX ADMIN — LISINOPRIL 10 MILLIGRAM(S): 2.5 TABLET ORAL at 21:47

## 2024-04-14 RX ADMIN — Medication 400 MILLIGRAM(S): at 13:54

## 2024-04-14 RX ADMIN — Medication 5 MILLIGRAM(S): at 13:54

## 2024-04-14 RX ADMIN — ATORVASTATIN CALCIUM 20 MILLIGRAM(S): 80 TABLET, FILM COATED ORAL at 21:47

## 2024-04-14 RX ADMIN — ENOXAPARIN SODIUM 40 MILLIGRAM(S): 100 INJECTION SUBCUTANEOUS at 19:33

## 2024-04-14 NOTE — ED PROVIDER NOTE - CARE PLAN
1 Principal Discharge DX:	Acute lumbar radiculopathy  Secondary Diagnosis:	Compression deformity of vertebra

## 2024-04-14 NOTE — ED ADULT NURSE NOTE - OBJECTIVE STATEMENT
Pt c/o back pain radiating to the right leg x 1 week. Denies any falls or injuries. No pain meds taken PTA. denies radiating down legs or incontinence.

## 2024-04-14 NOTE — ED CLERICAL - NS ED CARE COORDINATION INFORMATION

## 2024-04-14 NOTE — PATIENT PROFILE ADULT - FALL HARM RISK - HARM RISK INTERVENTIONS

## 2024-04-14 NOTE — H&P ADULT - ASSESSMENT
86yo female with medical h/o HTN, Dyslipidemia, Hypothyroid presenting with back pain, admitted for T12 compression fracture    #T12 compression fracture  CT scan findings as aboce  -ortho consult  -    #HTN    #HLD    #hypothyroid 84yo female with medical h/o HTN, HLD, Hypothyroid presenting with back pain, admitted for T12 compression fracture    #Intractable back pain due to T12 compression fracture causing ambulatory dysfunction  #newly diagnosed osteoporosis  CT scan findings as above  -ortho consult - ED attending spoke with Dr. Tim  -PT consult  -pain control with IV tylenol/topical NSAIDs/lidocaine patch  -TLSO brace    #HTN  -c/w home fosinopril    #HLD  -c/w home atorvastatin    #hypothyroid  -c/w synthroid    #FULL CODE  -palliative care consult - patient previously with [facility] stating DNR/DNI but now saying full code    Trip Diaz [phone]- updated at bedside    Case d/w Dr. Santoro 86yo female with medical h/o HTN, HLD, Hypothyroid presenting with back pain, admitted for T12 compression fracture    #Intractable back pain due to T12 compression fracture causing ambulatory dysfunction  #newly diagnosed osteoporosis  CT scan findings as above  -ortho consult - ED attending spoke with Dr. Tim  -PT consult  -pain control with IV tylenol/lidocaine patch, may consider adding topical NSAID  -TLSO brace  -Calcium and vitamin D supplement    #HTN  -c/w home fosinopril    #HLD  -c/w home atorvastatin    #hypothyroid  -c/w synthroid    #DVT PPx  -Lovenox    #FULL CODE  -palliative care consult - patient previously with [facility] stating DNR/DNI but now saying full code    Trip Diaz [phone]- updated at bedside    AM labs  Diet DASH  Fall risk  Bedrest until ortho eval    Case d/w Dr. Santoro 86yo female with medical h/o HTN, HLD, Hypothyroid presenting with back pain, admitted for T12 compression fracture    #Intractable back pain due to T12 compression fracture causing ambulatory dysfunction  #newly diagnosed osteoporosis  CT scan findings as above  -ortho consult - ED attending spoke with Dr. Tim  -PT consult  -pain control with IV tylenol/lidocaine patch, may consider adding topical NSAID  -TLSO brace  -Calcium and vitamin D supplement    #HTN  -c/w acei  -Monitor vital signs     #HLD  -c/w home atorvastatin    #hypothyroid  -c/w synthroid    #DVT PPx  -Lovenox    #FULL CODE  -palliative care consult - patient previously with [facility]OL[facility] stating DNR/DNI but now saying full code. [facility] was done [facility] Dr. Montgomery    Trip Diaz [phone]- updated at bedside    AM labs  Diet DASH  Fall risk  Bedrest until ortho eval    Case d/w Dr. Santoro

## 2024-04-14 NOTE — ED ADULT NURSE NOTE - NSFALLHARMRISKINTERV_ED_ALL_ED

## 2024-04-14 NOTE — ED PROVIDER NOTE - CLINICAL SUMMARY MEDICAL DECISION MAKING FREE TEXT BOX
89-year-old female from home came to the emergency room chief complaint of pain in the lower back radiating to the right lower leg associated with no weakness or numbness patient denies any fall recently  Patient treated with pain medications muscle relaxants with relief but patient was unable to stand up and walk unable to perform ADL patient was admitted since the patient lives alone Orthopedic doctor Dr. Jacobson informed

## 2024-04-14 NOTE — ED ADULT TRIAGE NOTE - HEIGHT IN INCHES
Your opinion matters! Thank you for choosing Dr. Bart Patterson at [facility]. You may receive a survey in the mail about today's visit.  We always appreciate feedback.  It was a pleasure to care for you today!       Please purchase Steel Shoe 1/2 Turf Toe Insole, Spring Steel Stabilizer Plate, 1 Pair  On amazon    Dr. Patterson   3

## 2024-04-14 NOTE — H&P ADULT - NSHPREVIEWOFSYSTEMS_GEN_ALL_CORE
Gen: No fever, decreased appetite  Eyes: No eye irritation or discharge  ENT: No ear pain, congestion, sore throat  Resp: No cough or trouble breathing  Cardiovascular: No chest pain or palpitation  Gastroenteric: No nausea/vomiting, diarrhea, + constipation (chronic)  :  No change in urine output; no dysuria  MS: No joint or muscle pain +back pain  Skin: No rashes  Neuro: No headache; no abnormal movements  Remainder negative, except as per the HPI Gen: No fever, decreased appetite  Eyes: No eye irritation or discharge  ENT: No ear pain, congestion, sore throat  Resp: No cough or trouble breathing  Cardiovascular: No chest pain or palpitation  Gastroenteric: No nausea/vomiting, diarrhea, + constipation (chronic)  :  No change in urine output; no dysuria +chronic incontinence  MS: No joint or muscle pain +back pain  Skin: No rashes  Neuro: No headache; no abnormal movements  Remainder negative, except as per the HPI

## 2024-04-14 NOTE — H&P ADULT - HISTORY OF PRESENT ILLNESS
86yo female with medical h/o HTN, Dyslipidemia, Hypothyroid presenting with back pain.     In the ED, VSS. CT lumbar spine with New mild T12 compression fracture without bony retropulsion since prior CT abdomen and pelvis 10/6/2023, correlate for point tenderness.Mild to moderate spinal canal stenosis or neural foraminal narrowing L2-3   through L4-5.    Denies urinary opr bowel retention or incontinence.    89yo female with medical h/o HTN, Dyslipidemia, Hypothyroid, cervical fusion  presenting with back pain x1 week. The pain is progressive located in the lower back, which has been Was moving cases of water some time last week, but doesn't remember a definitive injury.   Last fall was in October -   Denies urinary or bowel retention or incontinence. Last BM  Tramadol at home once per day.   Has never had a DEXA. No previous history of fracture    In the ED, VSS. CT lumbar spine with New mild T12 compression fracture without bony retropulsion since prior CT abdomen and pelvis 10/6/2023, correlate for point tenderness. Mild to moderate spinal canal stenosis or neural foraminal narrowing L2-3   through L4-5.     89yo female with medical h/o HTN, Dyslipidemia, Hypothyroid, cervical fusion  presenting with back pain x1 week. The pain is progressive located in the lower back, which has been Was moving cases of water some time last week, but doesn't remember a definitive injury. Seen by home service and given Tramadol at home once per day, which was helping, but still had difficulty completing ADLs because of the pain.   Last fall was in October .   Denies new urinary or bowel retention or incontinence. - chronic constipation and urinary incontinence with no new worsening  Has never had a DEXA. No previous history of fracture    In the ED, VSS. CT lumbar spine with New mild T12 compression fracture without bony retropulsion since prior CT abdomen and pelvis 10/6/2023, correlate for point tenderness. Mild to moderate spinal canal stenosis or neural foraminal narrowing L2-3 through L4-5.    Janusz CABRERA personally reviewed

## 2024-04-14 NOTE — ED ADULT TRIAGE NOTE - CHIEF COMPLAINT QUOTE
Pt with c/o back pain radiating to the right leg x 1 week. Denies any falls or injuries. No pain meds taken PTA.

## 2024-04-14 NOTE — ED PROVIDER NOTE - OBJECTIVE STATEMENT
89-year-old female from home came to the emergency room chief complaint of pain in the lower back radiating to the right lower leg associated with no weakness or numbness patient denies any fall recently

## 2024-04-14 NOTE — H&P ADULT - CONVERSATION DETAILS
Patient with previous MOLST saying DNR/DNI  Discussion with patient and son Josué Diaz at bedside revealed that patient would like to be intubated and have CPR but not if it was for "a long time." I informed the patient that in those situations, it is unclear how often a patient will be intubated.   Patient says that she would like CPR and intubation if it saves her.   I confirmed with patient and son that if there was a situation where she needed CPR/intubation, they WOULD be done. They both expressed agreement.

## 2024-04-14 NOTE — ED PROVIDER NOTE - NSICDXFAMILYHX_GEN_ALL_CORE_FT
Yes
FAMILY HISTORY:  Sibling  Still living? Unknown  Family history of brain tumor, Age at diagnosis: Age Unknown  Family history of colon cancer, Age at diagnosis: Age Unknown  Family history of lung cancer, Age at diagnosis: Age Unknown

## 2024-04-14 NOTE — H&P ADULT - NSHPPHYSICALEXAM_GEN_ALL_CORE
Vital Signs Last 24 Hrs  T(C): 36.4 (14 Apr 2024 13:22), Max: 36.4 (14 Apr 2024 13:22)  T(F): 97.5 (14 Apr 2024 13:22), Max: 97.5 (14 Apr 2024 13:22)  HR: 59 (14 Apr 2024 17:26) (59 - 73)  BP: 119/71 (14 Apr 2024 17:26) (119/71 - 147/79)  BP(mean): 84 (14 Apr 2024 17:26) (84 - 84)  RR: 18 (14 Apr 2024 17:26) (18 - 19)  SpO2: 98% (14 Apr 2024 17:26) (97% - 98%)    Parameters below as of 14 Apr 2024 16:11  Patient On (Oxygen Delivery Method): nasal cannula  O2 Flow (L/min): 2    PHYSICAL EXAM:    GENERAL: NAD, lying in bed comfortably  HEAD:  Atraumatic, Normocephalic  EYES: EOMI, PERRLA, conjunctiva and sclera clear  ENT: Moist mucous membranes  NECK: Supple, No JVD  CHEST/LUNG: Clear to auscultation bilaterally, good air entry bilaterally; No wheezing, rales, or rhonchi. Unlabored respirations  HEART: Regular rate and rhythm. S1 and S2. No murmurs, rubs, or gallops  ABDOMEN: Soft, Nontender, Nondistended. Bowel sounds present x4 quadrants; No hepatomegaly. No splenomegaly.  EXTREMITIES:  2+ Peripheral Pulses. Capillary refill <2 seconds. No clubbing, cyanosis, or edema  NERVOUS SYSTEM:  Alert & Oriented X3, speech clear. No deficits   MSK: FROM all 4 extremities, full and equal strength  SKIN: No rashes, bruises, or other lesions Vital Signs Last 24 Hrs  T(C): 36.4 (14 Apr 2024 13:22), Max: 36.4 (14 Apr 2024 13:22)  T(F): 97.5 (14 Apr 2024 13:22), Max: 97.5 (14 Apr 2024 13:22)  HR: 59 (14 Apr 2024 17:26) (59 - 73)  BP: 119/71 (14 Apr 2024 17:26) (119/71 - 147/79)  BP(mean): 84 (14 Apr 2024 17:26) (84 - 84)  RR: 18 (14 Apr 2024 17:26) (18 - 19)  SpO2: 98% (14 Apr 2024 17:26) (97% - 98%)    Parameters below as of 14 Apr 2024 16:11  Patient On (Oxygen Delivery Method): nasal cannula  O2 Flow (L/min): 2    PHYSICAL EXAM:    GENERAL: NAD, lying in bed comfortably  HEAD:  Atraumatic, Normocephalic  EYES:  PERRLA, conjunctiva and sclera clear  ENT: Moist mucous membranes  NECK: Supple  CHEST/LUNG: Clear to auscultation bilaterally, good air entry bilaterally; No wheezing, rales, or rhonchi. Unlabored respirations  HEART: Regular rate and rhythm. S1 and S2. +faint systolic murmur  ABDOMEN: Soft, Nontender, Nondistended. Bowel sounds present x4 quadrants  EXTREMITIES:  2+ Peripheral Pulses. Capillary refill <2 seconds. No clubbing, cyanosis, or edema  NERVOUS SYSTEM:  Alert & Oriented X3, speech clear. No deficits   MSK: FROM all 4 extremities, full and equal strength +TTP over lower thoracic/upper lumbar spine - lidocaine patch in place  SKIN: No rashes, bruises, or other lesions visible on back abdomen, chest or feet

## 2024-04-14 NOTE — ED PROVIDER NOTE - PHYSICAL EXAMINATION
General:     NAD, well-nourished, well-appearing  Head:     NC/AT, EOMI, oral mucosa moist  Neck:     trachea midline  Lungs:     CTA b/l, no w/r/r  CVS:     S1S2, RRR, no m/g/r  Abd:     +BS, s/nt/nd, no organomegaly  Ext:    2+ radial and pedal pulses, no c/c/e  Neuro: AAOx3, no sensory/motor deficits  MSK–positive paraspinal lumbar tenderness positive straight leg raising test on the left side

## 2024-04-14 NOTE — PATIENT PROFILE ADULT - NSPRESCRALCAMT_GEN_A_NUR
Subjective:       Patient ID: Eva Kohli 
1 or 2

## 2024-04-14 NOTE — H&P ADULT - ATTENDING COMMENTS
Palliative care for GOC. Pain management with tramadol, lidocaine patch and possible TLSO brace. PT eval placed

## 2024-04-15 ENCOUNTER — NON-APPOINTMENT (OUTPATIENT)
Age: 89
End: 2024-04-15

## 2024-04-15 LAB
24R-OH-CALCIDIOL SERPL-MCNC: 31.7 NG/ML — SIGNIFICANT CHANGE UP (ref 30–80)
ALBUMIN SERPL ELPH-MCNC: 2.7 G/DL — LOW (ref 3.3–5)
ALP SERPL-CCNC: 115 U/L — SIGNIFICANT CHANGE UP (ref 40–120)
ALT FLD-CCNC: 19 U/L — SIGNIFICANT CHANGE UP (ref 10–45)
ANION GAP SERPL CALC-SCNC: 12 MMOL/L — SIGNIFICANT CHANGE UP (ref 5–17)
AST SERPL-CCNC: 22 U/L — SIGNIFICANT CHANGE UP (ref 10–40)
BILIRUB SERPL-MCNC: 1.3 MG/DL — HIGH (ref 0.2–1.2)
BUN SERPL-MCNC: 38 MG/DL — HIGH (ref 7–23)
CALCIUM SERPL-MCNC: 9.1 MG/DL — SIGNIFICANT CHANGE UP (ref 8.4–10.5)
CHLORIDE SERPL-SCNC: 104 MMOL/L — SIGNIFICANT CHANGE UP (ref 96–108)
CO2 SERPL-SCNC: 25 MMOL/L — SIGNIFICANT CHANGE UP (ref 22–31)
CREAT SERPL-MCNC: 0.79 MG/DL — SIGNIFICANT CHANGE UP (ref 0.5–1.3)
EGFR: 71 ML/MIN/1.73M2 — SIGNIFICANT CHANGE UP
GLUCOSE SERPL-MCNC: 58 MG/DL — LOW (ref 70–99)
HCT VFR BLD CALC: 36.2 % — SIGNIFICANT CHANGE UP (ref 34.5–45)
HGB BLD-MCNC: 11.9 G/DL — SIGNIFICANT CHANGE UP (ref 11.5–15.5)
MCHC RBC-ENTMCNC: 30.8 PG — SIGNIFICANT CHANGE UP (ref 27–34)
MCHC RBC-ENTMCNC: 32.9 GM/DL — SIGNIFICANT CHANGE UP (ref 32–36)
MCV RBC AUTO: 93.8 FL — SIGNIFICANT CHANGE UP (ref 80–100)
NRBC # BLD: 0 /100 WBCS — SIGNIFICANT CHANGE UP (ref 0–0)
PLATELET # BLD AUTO: 243 K/UL — SIGNIFICANT CHANGE UP (ref 150–400)
POTASSIUM SERPL-MCNC: 3.7 MMOL/L — SIGNIFICANT CHANGE UP (ref 3.5–5.3)
POTASSIUM SERPL-SCNC: 3.7 MMOL/L — SIGNIFICANT CHANGE UP (ref 3.5–5.3)
PROT SERPL-MCNC: 6.2 G/DL — SIGNIFICANT CHANGE UP (ref 6–8.3)
RBC # BLD: 3.86 M/UL — SIGNIFICANT CHANGE UP (ref 3.8–5.2)
RBC # FLD: 13 % — SIGNIFICANT CHANGE UP (ref 10.3–14.5)
SODIUM SERPL-SCNC: 141 MMOL/L — SIGNIFICANT CHANGE UP (ref 135–145)
WBC # BLD: 6.12 K/UL — SIGNIFICANT CHANGE UP (ref 3.8–10.5)
WBC # FLD AUTO: 6.12 K/UL — SIGNIFICANT CHANGE UP (ref 3.8–10.5)

## 2024-04-15 PROCEDURE — 99223 1ST HOSP IP/OBS HIGH 75: CPT

## 2024-04-15 PROCEDURE — 99497 ADVNCD CARE PLAN 30 MIN: CPT | Mod: 25

## 2024-04-15 PROCEDURE — 99233 SBSQ HOSP IP/OBS HIGH 50: CPT | Mod: GC

## 2024-04-15 PROCEDURE — 99222 1ST HOSP IP/OBS MODERATE 55: CPT

## 2024-04-15 RX ORDER — KETOROLAC TROMETHAMINE 30 MG/ML
15 SYRINGE (ML) INJECTION ONCE
Refills: 0 | Status: DISCONTINUED | OUTPATIENT
Start: 2024-04-15 | End: 2024-04-15

## 2024-04-15 RX ORDER — ACETAMINOPHEN 500 MG
1000 TABLET ORAL ONCE
Refills: 0 | Status: COMPLETED | OUTPATIENT
Start: 2024-04-15 | End: 2024-04-15

## 2024-04-15 RX ADMIN — Medication 50 MICROGRAM(S): at 06:45

## 2024-04-15 RX ADMIN — LIDOCAINE 1 PATCH: 4 CREAM TOPICAL at 06:45

## 2024-04-15 RX ADMIN — LIDOCAINE 1 PATCH: 4 CREAM TOPICAL at 06:00

## 2024-04-15 RX ADMIN — LISINOPRIL 10 MILLIGRAM(S): 2.5 TABLET ORAL at 06:45

## 2024-04-15 RX ADMIN — ATORVASTATIN CALCIUM 20 MILLIGRAM(S): 80 TABLET, FILM COATED ORAL at 21:14

## 2024-04-15 RX ADMIN — Medication 400 MILLIGRAM(S): at 17:39

## 2024-04-15 RX ADMIN — Medication 1000 MILLIGRAM(S): at 18:20

## 2024-04-15 RX ADMIN — Medication 1 TABLET(S): at 17:41

## 2024-04-15 RX ADMIN — ENOXAPARIN SODIUM 40 MILLIGRAM(S): 100 INJECTION SUBCUTANEOUS at 17:52

## 2024-04-15 RX ADMIN — Medication 15 MILLIGRAM(S): at 13:00

## 2024-04-15 RX ADMIN — Medication 800 UNIT(S): at 12:52

## 2024-04-15 RX ADMIN — Medication 15 MILLIGRAM(S): at 12:52

## 2024-04-15 NOTE — PROGRESS NOTE ADULT - SUBJECTIVE AND OBJECTIVE BOX
Subjective and Objective:       Overnight Events:   Interval History: Patient was seen and examined by me this morning at bedside.     REVIEW OF SYSTEMS:  CONSTITUTIONAL: No weakness, fevers or chills  EYES/ENT: No visual changes;  No vertigo or throat pain   NECK: No pain or stiffness  RESPIRATORY: No cough, wheezing, hemoptysis; No shortness of breath  CARDIOVASCULAR: No chest pain or palpitations  GASTROINTESTINAL: No abdominal or epigastric pain. No nausea, vomiting; No diarrhea or constipation.   GENITOURINARY: No dysuria, frequency or hematuria  NEUROLOGICAL: No numbness or weakness  SKIN: No itching, burning, rashes, or lesions       Vital Signs Last 24 Hrs  T(C): 36.3 (15 Apr 2024 05:15), Max: 36.4 (14 Apr 2024 13:22)  T(F): 97.4 (15 Apr 2024 05:15), Max: 97.6 (14 Apr 2024 20:40)  HR: 56 (15 Apr 2024 05:15) (56 - 73)  BP: 153/88 (15 Apr 2024 05:15) (119/71 - 153/88)  BP(mean): 84 (14 Apr 2024 17:26) (84 - 84)  RR: 18 (15 Apr 2024 05:15) (17 - 19)  SpO2: 96% (15 Apr 2024 05:15) (96% - 98%)    Parameters below as of 15 Apr 2024 05:15  Patient On (Oxygen Delivery Method): room air    MEDICATIONS:  MEDICATIONS  (STANDING):  atorvastatin 20 milliGRAM(s) Oral at bedtime  calcium carbonate   1250 mG (OsCal) 1 Tablet(s) Oral two times a day  cholecalciferol 800 Unit(s) Oral daily  enoxaparin Injectable 40 milliGRAM(s) SubCutaneous every 24 hours  influenza  Vaccine (HIGH DOSE) 0.7 milliLiter(s) IntraMuscular once  levothyroxine 50 MICROGram(s) Oral daily  lisinopril 10 milliGRAM(s) Oral daily    Physical Exam  GENERAL: NAD, lying in bed comfortably  HEAD:  Atraumatic, Normocephalic  EYES:  PERRLA, conjunctiva and sclera clear  ENT: Moist mucous membranes  NECK: Supple  CHEST/LUNG: Clear to auscultation bilaterally, good air entry bilaterally; No wheezing, rales, or rhonchi. Unlabored respirations  HEART: Regular rate and rhythm. S1 and S2. +faint systolic murmur  ABDOMEN: Soft, Nontender, Nondistended. Bowel sounds present x4 quadrants  EXTREMITIES:  2+ Peripheral Pulses. Capillary refill <2 seconds. No clubbing, cyanosis, or edema  NERVOUS SYSTEM:  Alert & Oriented X3, speech clear. No deficits   MSK: FROM all 4 extremities, full and equal strength +TTP over lower thoracic/upper lumbar spine - lidocaine patch in place  SKIN: No rashes, bruises, or other lesions visible on back abdomen, chest or feet      LABS: All Labs Reviewed:              RADIOLOGY/EKG (personally reviewed):  < from: CT Lumbar Spine No Cont (04.14.24 @ 14:14) >  IMPRESSION:    New mild T12 compression fracture without bony retropulsion since prior   CT abdomen and pelvis 10/6/2023, correlate for point tenderness.    Mild to moderate spinal canal stenosis or neural foraminal narrowing L2-3   through L4-5.    < end of copied text >   Subjective and Objective:   88yo female with medical h/o HTN, HLD, Hypothyroid presenting with back pain, admitted for T12 compression fracture    Overnight Events: None  Interval History: Patient was seen and examined by me this morning at bedside. No acute complaints    REVIEW OF SYSTEMS:  CONSTITUTIONAL: No weakness, fevers or chills  EYES/ENT: No visual changes;  No vertigo or throat pain   NECK: No pain or stiffness  RESPIRATORY: No cough, wheezing, hemoptysis; No shortness of breath  CARDIOVASCULAR: No chest pain or palpitations  GASTROINTESTINAL: No abdominal or epigastric pain. No nausea, vomiting; No diarrhea or constipation.   GENITOURINARY: No dysuria, frequency or hematuria  NEUROLOGICAL: No numbness or weakness  SKIN: No itching, burning, rashes, or lesions       Vital Signs Last 24 Hrs  T(C): 36.3 (15 Apr 2024 05:15), Max: 36.4 (14 Apr 2024 13:22)  T(F): 97.4 (15 Apr 2024 05:15), Max: 97.6 (14 Apr 2024 20:40)  HR: 56 (15 Apr 2024 05:15) (56 - 73)  BP: 153/88 (15 Apr 2024 05:15) (119/71 - 153/88)  BP(mean): 84 (14 Apr 2024 17:26) (84 - 84)  RR: 18 (15 Apr 2024 05:15) (17 - 19)  SpO2: 96% (15 Apr 2024 05:15) (96% - 98%)    Parameters below as of 15 Apr 2024 05:15  Patient On (Oxygen Delivery Method): room air    MEDICATIONS:  MEDICATIONS  (STANDING):  atorvastatin 20 milliGRAM(s) Oral at bedtime  calcium carbonate   1250 mG (OsCal) 1 Tablet(s) Oral two times a day  cholecalciferol 800 Unit(s) Oral daily  enoxaparin Injectable 40 milliGRAM(s) SubCutaneous every 24 hours  influenza  Vaccine (HIGH DOSE) 0.7 milliLiter(s) IntraMuscular once  levothyroxine 50 MICROGram(s) Oral daily  lisinopril 10 milliGRAM(s) Oral daily    Physical Exam  GENERAL: NAD, lying in bed comfortably  HEAD:  Atraumatic, Normocephalic  EYES:  PERRLA, conjunctiva and sclera clear  ENT: Moist mucous membranes  NECK: Supple  CHEST/LUNG: Clear to auscultation bilaterally, good air entry bilaterally; No wheezing, rales, or rhonchi. Unlabored respirations  HEART: Regular rate and rhythm. S1 and S2. +faint systolic murmur  ABDOMEN: Soft, Nontender, Nondistended. Bowel sounds present x4 quadrants  EXTREMITIES:  2+ Peripheral Pulses. Capillary refill <2 seconds. No clubbing, cyanosis, or edema  NERVOUS SYSTEM:  Alert & Oriented X3, speech clear. No deficits   MSK: FROM all 4 extremities, full and equal strength +TTP over lower thoracic/upper lumbar spine - lidocaine patch in place  SKIN: No rashes, bruises, or other lesions visible on back abdomen, chest or feet      LABS: All Labs Reviewed:              RADIOLOGY/EKG (personally reviewed):  < from: CT Lumbar Spine No Cont (04.14.24 @ 14:14) >  IMPRESSION:    New mild T12 compression fracture without bony retropulsion since prior   CT abdomen and pelvis 10/6/2023, correlate for point tenderness.    Mild to moderate spinal canal stenosis or neural foraminal narrowing L2-3   through L4-5.    < end of copied text >   Subjective and Objective:   88yo female with medical h/o HTN, HLD, Hypothyroid presenting with back pain, admitted for T12 compression fracture    Overnight Events: None  Interval History: Patient was seen and examined by me this morning at bedside. No acute complaints    REVIEW OF SYSTEMS:  CONSTITUTIONAL: No weakness, fevers or chills  EYES/ENT: No visual changes;  No vertigo or throat pain   NECK: No pain or stiffness  RESPIRATORY: No cough, wheezing, hemoptysis; No shortness of breath  CARDIOVASCULAR: No chest pain or palpitations  GASTROINTESTINAL: No abdominal or epigastric pain. No nausea, vomiting; No diarrhea or constipation.   GENITOURINARY: No dysuria, frequency or hematuria  NEUROLOGICAL: No numbness or weakness  SKIN: No itching, burning, rashes, or lesions       Vital Signs Last 24 Hrs  T(C): 36.3 (15 Apr 2024 05:15), Max: 36.4 (14 Apr 2024 13:22)  T(F): 97.4 (15 Apr 2024 05:15), Max: 97.6 (14 Apr 2024 20:40)  HR: 56 (15 Apr 2024 05:15) (56 - 73)  BP: 153/88 (15 Apr 2024 05:15) (119/71 - 153/88)  BP(mean): 84 (14 Apr 2024 17:26) (84 - 84)  RR: 18 (15 Apr 2024 05:15) (17 - 19)  SpO2: 96% (15 Apr 2024 05:15) (96% - 98%)    Parameters below as of 15 Apr 2024 05:15  Patient On (Oxygen Delivery Method): room air    MEDICATIONS:  MEDICATIONS  (STANDING):  atorvastatin 20 milliGRAM(s) Oral at bedtime  calcium carbonate   1250 mG (OsCal) 1 Tablet(s) Oral two times a day  cholecalciferol 800 Unit(s) Oral daily  enoxaparin Injectable 40 milliGRAM(s) SubCutaneous every 24 hours  influenza  Vaccine (HIGH DOSE) 0.7 milliLiter(s) IntraMuscular once  levothyroxine 50 MICROGram(s) Oral daily  lisinopril 10 milliGRAM(s) Oral daily    Physical Exam  GENERAL: NAD, lying in bed comfortably  HEAD:  Atraumatic, Normocephalic  EYES:  PERRLA, conjunctiva and sclera clear  ENT: Moist mucous membranes  NECK: Supple  CHEST/LUNG: Clear to auscultation bilaterally, good air entry bilaterally; No wheezing, rales, or rhonchi. Unlabored respirations  HEART: Regular rate and rhythm. S1 and S2. +faint systolic murmur  ABDOMEN: Soft, Nontender, Nondistended. Bowel sounds present x4 quadrants  EXTREMITIES:  2+ Peripheral Pulses. Capillary refill <2 seconds. No clubbing, cyanosis, or edema  NERVOUS SYSTEM:  Alert & Oriented X3, speech clear. No deficits   MSK: FROM all 4 extremities, full and equal strength +TTP over lower thoracic/upper lumbar spine - lidocaine patch in place  SKIN: No rashes, bruises, or other lesions visible on back abdomen, chest or feet      LABS: All Labs Reviewed:                 11.9                 141  | 25   | 38           6.12  >-----------< 243     ------------------------< 58                    36.2                 3.7  | 104  | 0.79                                         Ca 9.1   Mg x     Ph x      Urinalysis Basic - ( 15 Apr 2024 06:33 )    Color: x / Appearance: x / SG: x / pH: x  Gluc: 58 mg/dL / Ketone: x  / Bili: x / Urobili: x   Blood: x / Protein: x / Nitrite: x   Leuk Esterase: x / RBC: x / WBC x   Sq Epi: x / Non Sq Epi: x / Bacteria: x               RADIOLOGY/EKG (personally reviewed):  < from: CT Lumbar Spine No Cont (04.14.24 @ 14:14) >  IMPRESSION:    New mild T12 compression fracture without bony retropulsion since prior   CT abdomen and pelvis 10/6/2023, correlate for point tenderness.    Mild to moderate spinal canal stenosis or neural foraminal narrowing L2-3   through L4-5.    < end of copied text >   Subjective and Objective:   89yo female with medical h/o HTN, HLD, Hypothyroid presenting with back pain, admitted for T12 compression fracture    Overnight Events: None  Interval History: Patient was seen and examined by me this morning at bedside. Says she has trouble urinating with primafit. Continues to have back pain    REVIEW OF SYSTEMS:  CONSTITUTIONAL: No weakness, fevers or chills  EYES/ENT: No visual changes;  No vertigo or throat pain   NECK: No pain or stiffness  RESPIRATORY: No cough, wheezing, hemoptysis; No shortness of breath  CARDIOVASCULAR: No chest pain or palpitations  GASTROINTESTINAL: No abdominal or epigastric pain. No nausea, vomiting; No diarrhea or constipation.   GENITOURINARY: No dysuria, frequency or hematuria  NEUROLOGICAL: No numbness or weakness  SKIN: No itching, burning, rashes, or lesions       Vital Signs Last 24 Hrs  T(C): 36.3 (15 Apr 2024 05:15), Max: 36.4 (14 Apr 2024 13:22)  T(F): 97.4 (15 Apr 2024 05:15), Max: 97.6 (14 Apr 2024 20:40)  HR: 56 (15 Apr 2024 05:15) (56 - 73)  BP: 153/88 (15 Apr 2024 05:15) (119/71 - 153/88)  BP(mean): 84 (14 Apr 2024 17:26) (84 - 84)  RR: 18 (15 Apr 2024 05:15) (17 - 19)  SpO2: 96% (15 Apr 2024 05:15) (96% - 98%)    Parameters below as of 15 Apr 2024 05:15  Patient On (Oxygen Delivery Method): room air    MEDICATIONS:  MEDICATIONS  (STANDING):  atorvastatin 20 milliGRAM(s) Oral at bedtime  calcium carbonate   1250 mG (OsCal) 1 Tablet(s) Oral two times a day  cholecalciferol 800 Unit(s) Oral daily  enoxaparin Injectable 40 milliGRAM(s) SubCutaneous every 24 hours  influenza  Vaccine (HIGH DOSE) 0.7 milliLiter(s) IntraMuscular once  levothyroxine 50 MICROGram(s) Oral daily  lisinopril 10 milliGRAM(s) Oral daily    Physical Exam  GENERAL: NAD, lying in bed comfortably  HEAD:  Atraumatic, Normocephalic  EYES:  PERRLA, conjunctiva and sclera clear  ENT: Moist mucous membranes  NECK: Supple  CHEST/LUNG: Clear to auscultation bilaterally, good air entry bilaterally; No wheezing, rales, or rhonchi. Unlabored respirations  HEART: Regular rate and rhythm. S1 and S2. +faint systolic murmur  ABDOMEN: Soft, Nontender, Nondistended. Bowel sounds present x4 quadrants  EXTREMITIES:  2+ Peripheral Pulses. Capillary refill <2 seconds. No clubbing, cyanosis, or edema  NERVOUS SYSTEM:  Alert & Oriented X3, speech clear. No deficits   MSK: FROM all 4 extremities, full and equal strength  SKIN: No rashes, bruises, or other lesions visible on back abdomen, chest or feet      LABS: All Labs Reviewed:                 11.9                 141  | 25   | 38           6.12  >-----------< 243     ------------------------< 58                    36.2                 3.7  | 104  | 0.79                                         Ca 9.1   Mg x     Ph x      Urinalysis Basic - ( 15 Apr 2024 06:33 )    Color: x / Appearance: x / SG: x / pH: x  Gluc: 58 mg/dL / Ketone: x  / Bili: x / Urobili: x   Blood: x / Protein: x / Nitrite: x   Leuk Esterase: x / RBC: x / WBC x   Sq Epi: x / Non Sq Epi: x / Bacteria: x      RADIOLOGY/EKG (personally reviewed):  < from: CT Lumbar Spine No Cont (04.14.24 @ 14:14) >  IMPRESSION:    New mild T12 compression fracture without bony retropulsion since prior   CT abdomen and pelvis 10/6/2023, correlate for point tenderness.    Mild to moderate spinal canal stenosis or neural foraminal narrowing L2-3   through L4-5.    < end of copied text >

## 2024-04-15 NOTE — PROGRESS NOTE ADULT - ASSESSMENT
86yo female with medical h/o HTN, HLD, Hypothyroid presenting with back pain, admitted for T12 compression fracture    #Intractable back pain due to T12 compression fracture causing ambulatory dysfunction  #newly diagnosed osteoporosis  CT scan findings as above  -ortho consult - ED attending spoke with Dr. Tim  -PT consult  -pain control with IV tylenol/lidocaine patch, may consider adding topical NSAID  -TLSO brace  -Calcium and vitamin D supplement    #HTN  -c/w acei  -Monitor vital signs     #HLD  -c/w home atorvastatin    #hypothyroid  -c/w synthroid    #DVT PPx  -Lovenox    #FULL CODE  -palliative care consult - patient previously with [facility] stating DNR/DNI but now saying full code. [facility] was done [facility] Dr. [place]    Son Josué Diaz [phone]- updated at bedside    AM labs  Diet DASH  Fall risk  Bedrest until ortho eval    Case d/w Dr. Wayne 89yo female with medical h/o HTN, HLD, Hypothyroid presenting with back pain, admitted for T12 compression fracture    #Intractable back pain due to T12 compression fracture causing ambulatory dysfunction  #newly diagnosed osteoporosis  CT scan findings as above  -ortho consult - ED attending spoke with Dr. Tim  -PT consult  -pain control with IV tylenol/lidocaine patch, may consider adding topical NSAID  -TLSO brace  -Calcium and vitamin D supplement    #HTN  -c/w acei  -Monitor vital signs     #HLD  -c/w home atorvastatin    #hypothyroid  -c/w synthroid    #DVT PPx  -Lovenox    #FULL CODE  -palliative care consult - patient previously with [facility] stating DNR/DNI but now saying full code. [facility] was done [facility] Dr. [place]    Son Josué Diaz [phone]- updated at bedside    AM labs  Diet DASH  Fall risk  Bedrest until ortho eval    Case d/w Dr. Wayne 88yo female with medical h/o HTN, HLD, Hypothyroid presenting with back pain, admitted for T12 compression fracture    #Intractable back pain due to T12 compression fracture causing ambulatory dysfunction  #newly diagnosed osteoporosis  CT scan findings as above  -ortho consult - ED attending spoke with Dr. Tim  -PT consult  -pain control with IV tylenol/lidocaine patch, may consider adding topical NSAID  -TLSO brace  -Calcium and vitamin D supplement  -f/u AM Vitamin D level    #HTN  -c/w acei  -Monitor vital signs     #HLD  -c/w home atorvastatin    #hypothyroid  -c/w synthroid    #DVT PPx  -Lovenox    #FULL CODE  -palliative care consult - patient previously with [facility] stating DNR/DNI but now saying full code. [facility] was done [facility] Dr. [place]    Trip Diaz [phone]- updated at bedside    AM labs  Diet DASH  Fall risk  Bedrest until ortho eval    Case d/w Dr. Wyane 89yo female with medical h/o HTN, HLD, Hypothyroid presenting with back pain, admitted for T12 compression fracture    #Intractable back pain due to T12 compression fracture causing ambulatory dysfunction  #newly diagnosed osteoporosis  CT scan findings as above  -ortho consult apprec  -PT consult when cleared by ortho  -pain control with IV tylenol/lidocaine patch, may consider adding topical NSAID  -TLSO brace  -Calcium and vitamin D supplement  -f/u AM Vitamin D level    #HTN  -c/w acei  -Monitor vital signs     #HLD  -c/w home atorvastatin    #hypothyroid  -c/w synthroid    #DVT PPx  -Lovenox    #FULL CODE  -palliative care consult  apprec    Trip Diaz [phone]- updated at bedside    AM labs  Diet DASH  Fall risk  OOB with assistance    Case d/w Dr. Wayne 88yo female with medical h/o HTN, HLD, Hypothyroid presenting with back pain, admitted for T12 compression fracture    #Intractable back pain due to T12 compression fracture causing ambulatory dysfunction  #newly diagnosed osteoporosis  CT scan findings as above  -ortho consult apprec  -PT consult when cleared by ortho  -pain control with IV tylenol/lidocaine patch, may consider adding topical NSAID  -TLSO brace  -Calcium and vitamin D supplement  -f/u AM Vitamin D level    #mild hyperbilirubinemia   -will reassess in AM, if still elevated will work up    #HTN  -c/w acei  -Monitor vital signs     #HLD  -c/w home atorvastatin    #hypothyroid  -c/w synthroid    #DVT PPx  -Lovenox    #FULL CODE  -palliative care consult  apprec    Trip Diaz [phone]- updated at bedside    AM labs  Diet DASH  Fall risk  OOB with assistance    Case d/w Dr. Wayne 89yo female with medical h/o HTN, HLD, Hypothyroid presenting with back pain, admitted for T12 compression pathological fracture    #Intractable back pain due to T12 compression fracture causing ambulatory dysfunction  #newly diagnosed osteoporosis  CT scan findings as above  -ortho consult apprec  -PT consult when cleared by ortho  -pain control with IV tylenol/lidocaine patch, may consider adding topical NSAID  -TLSO brace  -Calcium and vitamin D supplement  -f/u AM Vitamin D level    #mild hyperbilirubinemia   -will reassess in AM, if still elevated will work up    #HTN  -c/w acei  -Monitor vital signs     #HLD  -c/w home atorvastatin    #hypothyroid  -c/w synthroid    #DVT PPx  -Lovenox    #FULL CODE  -palliative care consult  apprec    Trip Diaz [phone]- updated at bedside    AM labs  Diet DASH  Fall risk  OOB with assistance    Case d/w Dr. Wayne

## 2024-04-15 NOTE — CONSULT NOTE ADULT - NS PRO AD PATIENT TYPE
Patient with Hypercapnic Respiratory failure which is Acute on chronic.  he is on home oxygen at 4 LPM. Supplemental oxygen was provided and noted- Oxygen Concentration (%):  [36] 36    .   Signs/symptoms of respiratory failure include- increased work of breathing. Contributing diagnoses includes - Obesity Hypoventilation and OSAS Labs and images were reviewed. Patient Has recent ABG, which has been reviewed. Will treat underlying causes and adjust management of respiratory failure as follows-     Continue NIPPV qhs and prn; pt routinely refuses  NC when awake and oriented as tolerated.     Health Care Proxy (HCP)

## 2024-04-15 NOTE — PHYSICAL THERAPY INITIAL EVALUATION ADULT - ADDITIONAL COMMENTS
Pt lives alone in a  with 4 steps and a rail to enter and 1 flight indoors with a chair lift. Pt was independent with ambulation without an assistive device however reports recently using a rollator. Pt's son and daughter in law live 1 hr away and grocery shop and clean for pt.

## 2024-04-15 NOTE — PHYSICAL THERAPY INITIAL EVALUATION ADULT - GAIT TRAINING, PT EVAL
In 1-3 therapy sessions pt will be able to ambulate 50 ft with TLSO and min A with appropriate assistive device.

## 2024-04-15 NOTE — CONSULT NOTE ADULT - CONVERSATION DETAILS
Met and spoke w/ pt this AM, pt fully alert, oriented, knows why she is here, and knows her medical history. We confirmed her HCP is son Josué, says papers are at home. Spoke w/ pt about her thoughts on cpr and intubation at this time, pt stated she initially did not understand what was being asked of her ,  and she said she said  no to everything, but then she realized that was not correct. Pt stated very clearly to me that she wants a trial of CPR and short trial intubation if needed. She would not want to keep the breathing tube in for long. I asked her if her son Josué knows these wishes, especially not to keep the tube in for long, and pt said yes , he knows.  Pt clear she wishes for trial CPR and intubation at this time if needed . has HCP in place

## 2024-04-15 NOTE — PHYSICAL THERAPY INITIAL EVALUATION ADULT - PERTINENT HX OF CURRENT PROBLEM, REHAB EVAL
89yo female with medical h/o HTN, Dyslipidemia, Hypothyroid, cervical fusion  presenting with back pain x1 week. The pain is progressive located in the lower back, which has been Was moving cases of water some time last week, but doesn't remember a definitive injury. Seen by home service and given Tramadol at home once per day, which was helping, but still had difficulty completing ADLs because of the pain.   Last fall was in October . Denies new urinary or bowel retention or incontinence. - chronic constipation and urinary incontinence with no new worsening  Has never had a DEXA. No previous history of fracture  In the ED, VSS. CT lumbar spine with New mild T12 compression fracture without bony retropulsion since prior CT abdomen and pelvis 10/6/2023, correlate for point tenderness. Mild to moderate spinal canal stenosis or neural foraminal narrowing L2-3 through L4-5. Janusz CABRERA personally reviewed  (14 Apr 2024 17:29)

## 2024-04-16 ENCOUNTER — TRANSCRIPTION ENCOUNTER (OUTPATIENT)
Age: 89
End: 2024-04-16

## 2024-04-16 DIAGNOSIS — K11.8 OTHER DISEASES OF SALIVARY GLANDS: ICD-10-CM

## 2024-04-16 LAB
A1C WITH ESTIMATED AVERAGE GLUCOSE RESULT: 5.2 % — SIGNIFICANT CHANGE UP (ref 4–5.6)
ALBUMIN SERPL ELPH-MCNC: 2.6 G/DL — LOW (ref 3.3–5)
ALP SERPL-CCNC: 126 U/L — HIGH (ref 40–120)
ALT FLD-CCNC: 17 U/L — SIGNIFICANT CHANGE UP (ref 10–45)
ANION GAP SERPL CALC-SCNC: 6 MMOL/L — SIGNIFICANT CHANGE UP (ref 5–17)
AST SERPL-CCNC: 20 U/L — SIGNIFICANT CHANGE UP (ref 10–40)
BILIRUB DIRECT SERPL-MCNC: 0.1 MG/DL — SIGNIFICANT CHANGE UP (ref 0–0.3)
BILIRUB SERPL-MCNC: 0.5 MG/DL — SIGNIFICANT CHANGE UP (ref 0.2–1.2)
BUN SERPL-MCNC: 41 MG/DL — HIGH (ref 7–23)
CALCIUM SERPL-MCNC: 9.4 MG/DL — SIGNIFICANT CHANGE UP (ref 8.4–10.5)
CHLORIDE SERPL-SCNC: 102 MMOL/L — SIGNIFICANT CHANGE UP (ref 96–108)
CO2 SERPL-SCNC: 28 MMOL/L — SIGNIFICANT CHANGE UP (ref 22–31)
CREAT SERPL-MCNC: 0.89 MG/DL — SIGNIFICANT CHANGE UP (ref 0.5–1.3)
EGFR: 62 ML/MIN/1.73M2 — SIGNIFICANT CHANGE UP
ESTIMATED AVERAGE GLUCOSE: 103 MG/DL — SIGNIFICANT CHANGE UP (ref 68–114)
GLUCOSE SERPL-MCNC: 84 MG/DL — SIGNIFICANT CHANGE UP (ref 70–99)
HCT VFR BLD CALC: 35.7 % — SIGNIFICANT CHANGE UP (ref 34.5–45)
HGB BLD-MCNC: 12 G/DL — SIGNIFICANT CHANGE UP (ref 11.5–15.5)
MCHC RBC-ENTMCNC: 31.6 PG — SIGNIFICANT CHANGE UP (ref 27–34)
MCHC RBC-ENTMCNC: 33.6 GM/DL — SIGNIFICANT CHANGE UP (ref 32–36)
MCV RBC AUTO: 93.9 FL — SIGNIFICANT CHANGE UP (ref 80–100)
NRBC # BLD: 0 /100 WBCS — SIGNIFICANT CHANGE UP (ref 0–0)
PLATELET # BLD AUTO: 278 K/UL — SIGNIFICANT CHANGE UP (ref 150–400)
POTASSIUM SERPL-MCNC: 4.2 MMOL/L — SIGNIFICANT CHANGE UP (ref 3.5–5.3)
POTASSIUM SERPL-SCNC: 4.2 MMOL/L — SIGNIFICANT CHANGE UP (ref 3.5–5.3)
PROT SERPL-MCNC: 6 G/DL — SIGNIFICANT CHANGE UP (ref 6–8.3)
RBC # BLD: 3.8 M/UL — SIGNIFICANT CHANGE UP (ref 3.8–5.2)
RBC # FLD: 12.7 % — SIGNIFICANT CHANGE UP (ref 10.3–14.5)
SODIUM SERPL-SCNC: 136 MMOL/L — SIGNIFICANT CHANGE UP (ref 135–145)
WBC # BLD: 7.85 K/UL — SIGNIFICANT CHANGE UP (ref 3.8–10.5)
WBC # FLD AUTO: 7.85 K/UL — SIGNIFICANT CHANGE UP (ref 3.8–10.5)

## 2024-04-16 PROCEDURE — 99233 SBSQ HOSP IP/OBS HIGH 50: CPT | Mod: GC

## 2024-04-16 PROCEDURE — 99223 1ST HOSP IP/OBS HIGH 75: CPT | Mod: FS

## 2024-04-16 RX ORDER — CHOLECALCIFEROL (VITAMIN D3) 125 MCG
800 CAPSULE ORAL
Qty: 0 | Refills: 0 | DISCHARGE
Start: 2024-04-16

## 2024-04-16 RX ORDER — CALCIUM CARBONATE 500(1250)
1 TABLET ORAL
Qty: 0 | Refills: 0 | DISCHARGE
Start: 2024-04-16

## 2024-04-16 RX ORDER — ACETAMINOPHEN 500 MG
650 TABLET ORAL EVERY 6 HOURS
Refills: 0 | Status: DISCONTINUED | OUTPATIENT
Start: 2024-04-16 | End: 2024-04-17

## 2024-04-16 RX ADMIN — ENOXAPARIN SODIUM 40 MILLIGRAM(S): 100 INJECTION SUBCUTANEOUS at 18:12

## 2024-04-16 RX ADMIN — LISINOPRIL 10 MILLIGRAM(S): 2.5 TABLET ORAL at 05:56

## 2024-04-16 RX ADMIN — Medication 1 TABLET(S): at 18:11

## 2024-04-16 RX ADMIN — Medication 650 MILLIGRAM(S): at 12:51

## 2024-04-16 RX ADMIN — ATORVASTATIN CALCIUM 20 MILLIGRAM(S): 80 TABLET, FILM COATED ORAL at 21:16

## 2024-04-16 RX ADMIN — Medication 650 MILLIGRAM(S): at 13:37

## 2024-04-16 RX ADMIN — Medication 800 UNIT(S): at 12:49

## 2024-04-16 RX ADMIN — Medication 50 MICROGRAM(S): at 05:56

## 2024-04-16 NOTE — DISCHARGE NOTE PROVIDER - HOSPITAL COURSE
89yo female with medical h/o HTN, HLD, Hypothyroid presenting with back pain, admitted for T12 compression fracture.   In the ED, VSS. CT showed compression fracture of T12 vertebrae. Patient was admitted to medical floors for further management.  Seen by orthopedics who recommended conservative management with TLSO brace and calcium/vit D supplementation.    Patient seen by PT, OT and physiatry. She is now medically optimized for discharge to acute rehab.     Overnight Events: None  Interval History: Patient was seen and examined by me this morning at bedside. Says she has trouble urinating with primafit. Continues to have back pain    REVIEW OF SYSTEMS:  CONSTITUTIONAL: No weakness, fevers or chills  EYES/ENT: No visual changes;  No vertigo or throat pain   NECK: No pain or stiffness  RESPIRATORY: No cough, wheezing, hemoptysis; No shortness of breath  CARDIOVASCULAR: No chest pain or palpitations  GASTROINTESTINAL: No abdominal or epigastric pain. No nausea, vomiting; No diarrhea or constipation.   GENITOURINARY: No dysuria, frequency or hematuria  NEUROLOGICAL: No numbness or weakness  SKIN: No itching, burning, rashes, or lesions     Vital Signs Last 24 Hrs  T(C): 36.3 (15 Apr 2024 05:15), Max: 36.4 (14 Apr 2024 13:22)  T(F): 97.4 (15 Apr 2024 05:15), Max: 97.6 (14 Apr 2024 20:40)  HR: 56 (15 Apr 2024 05:15) (56 - 73)  BP: 153/88 (15 Apr 2024 05:15) (119/71 - 153/88)  BP(mean): 84 (14 Apr 2024 17:26) (84 - 84)  RR: 18 (15 Apr 2024 05:15) (17 - 19)  SpO2: 96% (15 Apr 2024 05:15) (96% - 98%)    Parameters below as of 15 Apr 2024 05:15  Patient On (Oxygen Delivery Method): room air    Physical Exam  GENERAL: NAD, lying in bed comfortably  HEAD:  Atraumatic, Normocephalic  EYES:  PERRLA, conjunctiva and sclera clear  ENT: Moist mucous membranes  NECK: Supple  CHEST/LUNG: Clear to auscultation bilaterally, good air entry bilaterally; No wheezing, rales, or rhonchi. Unlabored respirations  HEART: Regular rate and rhythm. S1 and S2. +faint systolic murmur  ABDOMEN: Soft, Nontender, Nondistended. Bowel sounds present x4 quadrants  EXTREMITIES:  2+ Peripheral Pulses. Capillary refill <2 seconds. No clubbing, cyanosis, or edema  NERVOUS SYSTEM:  Alert & Oriented X3, speech clear. No deficits   MSK: FROM all 4 extremities, full and equal strength      RADIOLOGY/EKG (personally reviewed):  < from: CT Lumbar Spine No Cont (04.14.24 @ 14:14) >  IMPRESSION:    New mild T12 compression fracture without bony retropulsion since prior   CT abdomen and pelvis 10/6/2023, correlate for point tenderness.    Mild to moderate spinal canal stenosis or neural foraminal narrowing L2-3   through L4-5.    < end of copied text >   89yo female with medical h/o HTN, HLD, Hypothyroid presenting with back pain, admitted for T12 compression fracture causing intractable pain and ambulatory difficulty.   In the ED, VSS. CT showed new, mild compression fracture of T12 vertebrae. Patient was admitted to medical floors for further management.  Seen by orthopedics who recommended conservative management with TLSO brace and calcium/vit D supplementation.    Patient seen by PT, OT and physiatry. She is now medically optimized for discharge to acute rehab with follow up with PCP and orthopedics.     Overnight Events: None  Interval History: Patient was seen and examined by me this morning at bedside. Says she has trouble urinating with primafit. Continues to have back pain    REVIEW OF SYSTEMS:  CONSTITUTIONAL: No weakness, fevers or chills  EYES/ENT: No visual changes;  No vertigo or throat pain   NECK: No pain or stiffness  RESPIRATORY: No cough, wheezing, hemoptysis; No shortness of breath  CARDIOVASCULAR: No chest pain or palpitations  GASTROINTESTINAL: No abdominal or epigastric pain. No nausea, vomiting; No diarrhea or constipation.   GENITOURINARY: No dysuria, frequency or hematuria  NEUROLOGICAL: No numbness or weakness  SKIN: No itching, burning, rashes, or lesions     Vital Signs Last 24 Hrs  T(C): 36.6 (16 Apr 2024 12:00), Max: 37.1 (16 Apr 2024 05:06)  T(F): 97.9 (16 Apr 2024 12:00), Max: 98.8 (16 Apr 2024 05:06)  HR: 66 (16 Apr 2024 12:00) (57 - 74)  BP: 109/66 (16 Apr 2024 12:00) (96/65 - 139/83)  BP(mean): --  RR: 16 (16 Apr 2024 12:00) (16 - 16)  SpO2: 97% (16 Apr 2024 12:00) (96% - 98%)    Parameters below as of 16 Apr 2024 12:00  Patient On (Oxygen Delivery Method): room air    Physical Exam  GENERAL: NAD, lying in bed comfortably  HEAD:  Atraumatic, Normocephalic  EYES:  PERRLA, conjunctiva and sclera clear  ENT: Moist mucous membranes  NECK: Supple  CHEST/LUNG: Clear to auscultation bilaterally, good air entry bilaterally; No wheezing, rales, or rhonchi. Unlabored respirations  HEART: Regular rate and rhythm. S1 and S2. +faint systolic murmur  ABDOMEN: Soft, Nontender, Nondistended. Bowel sounds present x4 quadrants  EXTREMITIES:  2+ Peripheral Pulses. Capillary refill <2 seconds. No clubbing, cyanosis, or edema  NERVOUS SYSTEM:  Alert & Oriented X3, speech clear. No deficits   MSK: limited mobility due to pain but strength full and intact      RADIOLOGY/EKG (personally reviewed):  < from: CT Lumbar Spine No Cont (04.14.24 @ 14:14) >  IMPRESSION:    New mild T12 compression fracture without bony retropulsion since prior   CT abdomen and pelvis 10/6/2023, correlate for point tenderness.    Mild to moderate spinal canal stenosis or neural foraminal narrowing L2-3   through L4-5.    < end of copied text >   89yo female with medical h/o HTN, HLD, Hypothyroid presenting with back pain, admitted for T12 compression fracture causing intractable pain and ambulatory difficulty.   In the ED, VSS. CT showed new, mild compression fracture of T12 vertebrae. Patient was admitted to medical floors for further management.  Seen by orthopedics who recommended conservative management with TLSO brace and calcium/vit D supplementation.    Patient seen by PT, OT and physiatry. She is now medically optimized for discharge to acute rehab with follow up with PCP and orthopedics.     Overnight Events: None  Interval History: Patient was seen and examined by me this morning at bedside. Says she has trouble urinating with primafit. Continues to have back pain    REVIEW OF SYSTEMS:  CONSTITUTIONAL: No weakness, fevers or chills  EYES/ENT: No visual changes;  No vertigo or throat pain   NECK: No pain or stiffness  RESPIRATORY: No cough, wheezing, hemoptysis; No shortness of breath  CARDIOVASCULAR: No chest pain or palpitations  GASTROINTESTINAL: No abdominal or epigastric pain. No nausea, vomiting; No diarrhea or constipation.   GENITOURINARY: No dysuria, frequency or hematuria  NEUROLOGICAL: No numbness or weakness  SKIN: No itching, burning, rashes, or lesions     Vital Signs Last 24 Hrs  T(C): 36.6 (16 Apr 2024 12:00), Max: 37.1 (16 Apr 2024 05:06)  T(F): 97.9 (16 Apr 2024 12:00), Max: 98.8 (16 Apr 2024 05:06)  HR: 66 (16 Apr 2024 12:00) (57 - 74)  BP: 109/66 (16 Apr 2024 12:00) (96/65 - 139/83)  BP(mean): --  RR: 16 (16 Apr 2024 12:00) (16 - 16)  SpO2: 97% (16 Apr 2024 12:00) (96% - 98%)    Parameters below as of 16 Apr 2024 12:00  Patient On (Oxygen Delivery Method): room air    Physical Exam  GENERAL: NAD, lying in bed comfortably  HEAD:  Atraumatic, Normocephalic  EYES:   conjunctiva and sclera clear  ENT: Moist mucous membranes  NECK: Supple  CHEST/LUNG: Clear to auscultation bilaterally, good air entry bilaterally; No wheezing, rales, or rhonchi. Unlabored respirations  HEART: Regular rate and rhythm. S1 and S2. +faint systolic murmur  ABDOMEN: Soft, Nontender, Nondistended. Bowel sounds present x4 quadrants  EXTREMITIES:  2+ Peripheral Pulses. Capillary refill <2 seconds. No clubbing, cyanosis, or edema  NERVOUS SYSTEM:  Alert & Oriented X3, speech clear. No deficits   MSK: limited mobility due to pain but strength full and intact      RADIOLOGY/EKG (personally reviewed):  < from: CT Lumbar Spine No Cont (04.14.24 @ 14:14) >  IMPRESSION:    New mild T12 compression fracture without bony retropulsion since prior   CT abdomen and pelvis 10/6/2023, correlate for point tenderness.    Mild to moderate spinal canal stenosis or neural foraminal narrowing L2-3   through L4-5.    < end of copied text >

## 2024-04-16 NOTE — DISCHARGE NOTE PROVIDER - NSDCCPCAREPLAN_GEN_ALL_CORE_FT
PRINCIPAL DISCHARGE DIAGNOSIS  Diagnosis: Thoracic compression fracture  Assessment and Plan of Treatment: You were admitted with a compression fracture for which you got a brace and calcium and vitamin D supplement.   •Avoid activities that may make the pain worse, such as picking up heavy objects. When the pain decreases, begin normal, slow movements as directed by your healthcare provider. Your healthcare provider may have you do weight-bearing exercises such as walking. You may also do non-weight-bearing exercises such as swimming and bicycling.  •When you  objects, bend at the hips and knees. Never bend from the waist only. Use bent knees and your leg muscles as you lift the object. While you lift the object, keep it close to your chest. Try not to twist or lift anything above your waist.  If you develop numbness or weakness of your legs or if you develop incontinence or difficulty urinating or having a bowel movement, seek medical care.  Please follow up with your primary care doctor and orthopedist.     PRINCIPAL DISCHARGE DIAGNOSIS  Diagnosis: Thoracic compression fracture  Assessment and Plan of Treatment: You were admitted with a compression fracture for which you got a brace and calcium and vitamin D supplement.   •Avoid activities that may make the pain worse, such as picking up heavy objects. When the pain decreases, begin normal, slow movements as directed by your healthcare provider. Your healthcare provider may have you do weight-bearing exercises such as walking. You may also do non-weight-bearing exercises such as swimming and bicycling.  •When you  objects, bend at the hips and knees. Never bend from the waist only. Use bent knees and your leg muscles as you lift the object. While you lift the object, keep it close to your chest. Try not to twist or lift anything above your waist.  If you develop numbness or weakness of your legs or if you develop incontinence or difficulty urinating or having a bowel movement, seek medical care.  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  Please follow up with your primary care doctor and orthopedist.     PRINCIPAL DISCHARGE DIAGNOSIS  Diagnosis: Thoracic compression fracture  Assessment and Plan of Treatment: You were admitted with a compression fracture for which you got a brace and calcium and vitamin D supplement.   •Avoid activities that may make the pain worse, such as picking up heavy objects. When the pain decreases, begin normal, slow movements as directed by your healthcare provider. Your healthcare provider may have you do weight-bearing exercises such as walking. You may also do non-weight-bearing exercises such as swimming and bicycling.  •When you  objects, bend at the hips and knees. Never bend from the waist only. Use bent knees and your leg muscles as you lift the object. While you lift the object, keep it close to your chest. Try not to twist or lift anything above your waist.  If you develop numbness or weakness of your legs or if you develop incontinence or difficulty urinating or having a bowel movement, seek medical care.  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  Also, please continue to follow up with your primary care doctor for evaluation of your ovarian cyst.  Please follow up with your primary care doctor and orthopedist.     PRINCIPAL DISCHARGE DIAGNOSIS  Diagnosis: Thoracic compression fracture  Assessment and Plan of Treatment: You were admitted with a compression fracture for which you got a brace and calcium and vitamin D supplement.   •Avoid activities that may make the pain worse, such as picking up heavy objects. When the pain decreases, begin normal, slow movements as directed by your healthcare provider. Your healthcare provider may have you do weight-bearing exercises such as walking. You may also do non-weight-bearing exercises such as swimming and bicycling.  •When you  objects, bend at the hips and knees. Never bend from the waist only. Use bent knees and your leg muscles as you lift the object. While you lift the object, keep it close to your chest. Try not to twist or lift anything above your waist.  If you develop numbness or weakness of your legs or if you develop incontinence or difficulty urinating or having a bowel movement, seek medical care.  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 We included conatct informatino for an ENT that you can chose to follow up with or find your own ENT.   Also, please continue to follow up with your primary care doctor for evaluation of your ovarian cyst.  Please follow up with your primary care doctor and orthopedist.     PRINCIPAL DISCHARGE DIAGNOSIS  Diagnosis: Thoracic compression fracture  Assessment and Plan of Treatment: You were admitted with a compression fracture for which you got a brace and calcium and vitamin D supplement.   •Avoid activities that may make the pain worse, such as picking up heavy objects. When the pain decreases, begin normal, slow movements as directed by your healthcare provider. Your healthcare provider may have you do weight-bearing exercises such as walking. You may also do non-weight-bearing exercises such as swimming and bicycling.  •When you  objects, bend at the hips and knees. Never bend from the waist only. Use bent knees and your leg muscles as you lift the object. While you lift the object, keep it close to your chest. Try not to twist or lift anything above your waist.  If you develop numbness or weakness of your legs or if you develop incontinence or difficulty urinating or having a bowel movement, seek medical care.  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   Please follow this up with your primary care doctor. We included contact [facility] for an ENT that you can chose to follow up with or find your own ENT.   Also, please continue to follow up with your primary care doctor for evaluation of your ovarian cyst.  Please follow up with your primary care doctor and orthopedist.

## 2024-04-16 NOTE — OCCUPATIONAL THERAPY INITIAL EVALUATION ADULT - ADDITIONAL COMMENTS
Pt lives in private home with 3-4 SANTIAGO with bilateral HR. Chair lift to 2nd level where bedroom and bathroom are. No Bathroom on main level. Bathroom has tub shower with shower chair.  Pt ambulates with SAC. Also owns RW and rollator. Pt is independent with all ADLs and some IADLs such as simple meal prep and light cleaning. Pt does not drive.

## 2024-04-16 NOTE — DISCHARGE NOTE PROVIDER - NSDCMRMEDTOKEN_GEN_ALL_CORE_FT
atorvastatin 20 mg oral tablet: 1 tab(s) orally once a day (at bedtime)  fosinopril 10 mg oral tablet: 1 tab(s) orally once a day (at bedtime)  Synthroid 50 mcg (0.05 mg) oral tablet: 1 tab(s) orally once a day  TLSO for T12 compression fracture : Please provide patient with TLSO brace   atorvastatin 20 mg oral tablet: 1 tab(s) orally once a day (at bedtime)  calcium carbonate 1250 mg (500 mg elemental calcium) oral tablet: 1 tab(s) orally 2 times a day  cholecalciferol oral tablet: 800 unit(s) orally once a day  fosinopril 10 mg oral tablet: 1 tab(s) orally once a day (at bedtime)  Synthroid 50 mcg (0.05 mg) oral tablet: 1 tab(s) orally once a day  TLSO for T12 compression fracture : Please provide patient with TLSO brace

## 2024-04-16 NOTE — PROGRESS NOTE ADULT - ASSESSMENT
89yo female with medical h/o HTN, HLD, Hypothyroid presenting with back pain, admitted for T12 compression pathological fracture    #Intractable back pain due to T12 compression fracture causing ambulatory dysfunction  #newly diagnosed osteoporosis  CT scan findings as above  Vit D level WNL  -ortho consult apprec  -PT consult when cleared by ortho  -pain control with IV tylenol/lidocaine patch, may consider adding topical NSAID  -TLSO brace  -Calcium and vitamin D supplement      #mild hyperbilirubinemia   -will reassess in AM, if still elevated will work up    #HTN  -c/w acei  -Monitor vital signs     #HLD  -c/w home atorvastatin    #hypothyroid  -c/w synthroid    #DVT PPx  -Lovenox    #FULL CODE  -palliative care consult  apprec    Son Josué Diaz [phone]    AM labs  Diet DASH  Fall risk  OOB with assistance    Case d/w Dr. Wayne 88yo female with medical h/o HTN, HLD, Hypothyroid presenting with back pain, admitted for T12 compression pathological fracture    #Intractable back pain due to T12 compression fracture causing ambulatory dysfunction  #newly diagnosed osteoporosis  CT scan findings as above  Vit D level WNL  -ortho consult apprec  -PT consult apprec-> to Acute Rehab  -pain control with IV tylenol/lidocaine patch, may consider adding topical NSAID  -TLSO brace  -Calcium and vitamin D supplement  -f/u OT/PMR    #mild hyperbilirubinemia   IMPROVED    #HTN  -c/w acei  -Monitor vital signs     #HLD  -c/w home atorvastatin    #hypothyroid  -c/w synthroid    #DVT PPx  -Lovenox    #FULL CODE  -palliative care consult  apprec    Trip Diaz [phone]    AM labs  Diet DASH  Fall risk  OOB with assistance    Case d/w Dr. Wayne

## 2024-04-16 NOTE — PROGRESS NOTE ADULT - SUBJECTIVE AND OBJECTIVE BOX
Subjective and Objective:   88yo female with medical h/o HTN, HLD, Hypothyroid presenting with back pain, admitted for T12 compression fracture    Overnight Events: None  Interval History: Patient was seen and examined by me this morning at bedside. Says she has trouble urinating with primafit. Continues to have back pain    REVIEW OF SYSTEMS:  CONSTITUTIONAL: No weakness, fevers or chills  EYES/ENT: No visual changes;  No vertigo or throat pain   NECK: No pain or stiffness  RESPIRATORY: No cough, wheezing, hemoptysis; No shortness of breath  CARDIOVASCULAR: No chest pain or palpitations  GASTROINTESTINAL: No abdominal or epigastric pain. No nausea, vomiting; No diarrhea or constipation.   GENITOURINARY: No dysuria, frequency or hematuria  NEUROLOGICAL: No numbness or weakness  SKIN: No itching, burning, rashes, or lesions       Vital Signs Last 24 Hrs  T(C): 36.3 (15 Apr 2024 05:15), Max: 36.4 (14 Apr 2024 13:22)  T(F): 97.4 (15 Apr 2024 05:15), Max: 97.6 (14 Apr 2024 20:40)  HR: 56 (15 Apr 2024 05:15) (56 - 73)  BP: 153/88 (15 Apr 2024 05:15) (119/71 - 153/88)  BP(mean): 84 (14 Apr 2024 17:26) (84 - 84)  RR: 18 (15 Apr 2024 05:15) (17 - 19)  SpO2: 96% (15 Apr 2024 05:15) (96% - 98%)    Parameters below as of 15 Apr 2024 05:15  Patient On (Oxygen Delivery Method): room air    MEDICATIONS:  MEDICATIONS  (STANDING):  atorvastatin 20 milliGRAM(s) Oral at bedtime  calcium carbonate   1250 mG (OsCal) 1 Tablet(s) Oral two times a day  cholecalciferol 800 Unit(s) Oral daily  enoxaparin Injectable 40 milliGRAM(s) SubCutaneous every 24 hours  influenza  Vaccine (HIGH DOSE) 0.7 milliLiter(s) IntraMuscular once  levothyroxine 50 MICROGram(s) Oral daily  lisinopril 10 milliGRAM(s) Oral daily    Physical Exam  GENERAL: NAD, lying in bed comfortably  HEAD:  Atraumatic, Normocephalic  EYES:  PERRLA, conjunctiva and sclera clear  ENT: Moist mucous membranes  NECK: Supple  CHEST/LUNG: Clear to auscultation bilaterally, good air entry bilaterally; No wheezing, rales, or rhonchi. Unlabored respirations  HEART: Regular rate and rhythm. S1 and S2. +faint systolic murmur  ABDOMEN: Soft, Nontender, Nondistended. Bowel sounds present x4 quadrants  EXTREMITIES:  2+ Peripheral Pulses. Capillary refill <2 seconds. No clubbing, cyanosis, or edema  NERVOUS SYSTEM:  Alert & Oriented X3, speech clear. No deficits   MSK: FROM all 4 extremities, full and equal strength  SKIN: No rashes, bruises, or other lesions visible on back abdomen, chest or feet      LABS: All Labs Reviewed:                 11.9                 141  | 25   | 38           6.12  >-----------< 243     ------------------------< 58                    36.2                 3.7  | 104  | 0.79                                         Ca 9.1   Mg x     Ph x      Urinalysis Basic - ( 15 Apr 2024 06:33 )    Color: x / Appearance: x / SG: x / pH: x  Gluc: 58 mg/dL / Ketone: x  / Bili: x / Urobili: x   Blood: x / Protein: x / Nitrite: x   Leuk Esterase: x / RBC: x / WBC x   Sq Epi: x / Non Sq Epi: x / Bacteria: x      RADIOLOGY/EKG (personally reviewed):  < from: CT Lumbar Spine No Cont (04.14.24 @ 14:14) >  IMPRESSION:    New mild T12 compression fracture without bony retropulsion since prior   CT abdomen and pelvis 10/6/2023, correlate for point tenderness.    Mild to moderate spinal canal stenosis or neural foraminal narrowing L2-3   through L4-5.    < end of copied text >   Subjective and Objective:   88yo female with medical h/o HTN, HLD, Hypothyroid presenting with back pain, admitted for T12 compression fracture    Overnight Events: None  Interval History: Patient was seen and examined by me this morning at bedside. Says she has trouble urinating with primafit. Continues to have back pain    REVIEW OF SYSTEMS:  CONSTITUTIONAL: No weakness, fevers or chills  EYES/ENT: No visual changes;  No vertigo or throat pain   NECK: No pain or stiffness  RESPIRATORY: No cough, wheezing, hemoptysis; No shortness of breath  CARDIOVASCULAR: No chest pain or palpitations  GASTROINTESTINAL: No abdominal or epigastric pain. No nausea, vomiting; No diarrhea or constipation.   GENITOURINARY: No dysuria, frequency or hematuria  NEUROLOGICAL: No numbness or weakness  SKIN: No itching, burning, rashes, or lesions       Vital Signs Last 24 Hrs  T(C): 36.3 (15 Apr 2024 05:15), Max: 36.4 (14 Apr 2024 13:22)  T(F): 97.4 (15 Apr 2024 05:15), Max: 97.6 (14 Apr 2024 20:40)  HR: 56 (15 Apr 2024 05:15) (56 - 73)  BP: 153/88 (15 Apr 2024 05:15) (119/71 - 153/88)  BP(mean): 84 (14 Apr 2024 17:26) (84 - 84)  RR: 18 (15 Apr 2024 05:15) (17 - 19)  SpO2: 96% (15 Apr 2024 05:15) (96% - 98%)    Parameters below as of 15 Apr 2024 05:15  Patient On (Oxygen Delivery Method): room air    MEDICATIONS:  MEDICATIONS  (STANDING):  atorvastatin 20 milliGRAM(s) Oral at bedtime  calcium carbonate   1250 mG (OsCal) 1 Tablet(s) Oral two times a day  cholecalciferol 800 Unit(s) Oral daily  enoxaparin Injectable 40 milliGRAM(s) SubCutaneous every 24 hours  influenza  Vaccine (HIGH DOSE) 0.7 milliLiter(s) IntraMuscular once  levothyroxine 50 MICROGram(s) Oral daily  lisinopril 10 milliGRAM(s) Oral daily    Physical Exam  GENERAL: NAD, lying in bed comfortably  HEAD:  Atraumatic, Normocephalic  EYES:  PERRLA, conjunctiva and sclera clear  ENT: Moist mucous membranes  NECK: Supple  CHEST/LUNG: Clear to auscultation bilaterally, good air entry bilaterally; No wheezing, rales, or rhonchi. Unlabored respirations  HEART: Regular rate and rhythm. S1 and S2. +faint systolic murmur  ABDOMEN: Soft, Nontender, Nondistended. Bowel sounds present x4 quadrants  EXTREMITIES:  2+ Peripheral Pulses. Capillary refill <2 seconds. No clubbing, cyanosis, or edema  NERVOUS SYSTEM:  Alert & Oriented X3, speech clear. No deficits   MSK: FROM all 4 extremities, full and equal strength  SKIN: No rashes, bruises, or other lesions visible on back abdomen, chest or feet      LABS: All Labs Reviewed:              12.0                 136  | 28   | 41           7.85  >-----------< 278     ------------------------< 84                    35.7                 4.2  | 102  | 0.89                                         Ca 9.4   Mg x     Ph x      Urinalysis Basic - ( 16 Apr 2024 05:48 )    Color: x / Appearance: x / SG: x / pH: x  Gluc: 84 mg/dL / Ketone: x  / Bili: x / Urobili: x   Blood: x / Protein: x / Nitrite: x   Leuk Esterase: x / RBC: x / WBC x   Sq Epi: x / Non Sq Epi: x / Bacteria: x      RADIOLOGY/EKG (personally reviewed):  < from: CT Lumbar Spine No Cont (04.14.24 @ 14:14) >  IMPRESSION:    New mild T12 compression fracture without bony retropulsion since prior   CT abdomen and pelvis 10/6/2023, correlate for point tenderness.    Mild to moderate spinal canal stenosis or neural foraminal narrowing L2-3   through L4-5.    < end of copied text >

## 2024-04-16 NOTE — OCCUPATIONAL THERAPY INITIAL EVALUATION ADULT - GENERAL OBSERVATIONS, REHAB EVAL
Conferred with RN, pt ok to be seen. Pt greeted in bed, agreeable to OT IE. Tolerated well. NAD. Pt left seated in recliner with call bell, +primafit. NAD. RN aware.

## 2024-04-16 NOTE — DISCHARGE NOTE PROVIDER - PROVIDER TOKENS
PROVIDER:[[id number]],FOLLOWUP:[1 week]],PROVIDER:[[id number]],FOLLOWUP:[1 week]] PROVIDER:[TOKEN:[[id number]],FOLLOWUP:[1 week]],PROVIDER:[[id number]],FOLLOWUP:[1 week]],PROVIDER:[TOKEN:[[id number]]] PROVIDER:[TOKEN:[[id number]],FOLLOWUP:[1 week]],PROVIDER:[[id number]],FOLLOWUP:[1 week]],PROVIDER:[TOKEN:[[id number]]],PROVIDER:[[id number]],FOLLOWUP:[2 weeks]]

## 2024-04-16 NOTE — CONSULT NOTE ADULT - SUBJECTIVE AND OBJECTIVE BOX
Patient is a 89y old  Female who presents with a chief complaint of Compression fracture (16 Apr 2024 13:09)      HPI:  This is a 88 YO female with PMH of HTN, Dyslipidemia, Hypothyroid, cervical fusion who presented to [facility] on 4/14 with back pain x1 week. The pain is progressive located in the lower back, which has been Was moving cases of water some time last week, but doesn't remember a definitive injury. Seen by home service and given Tramadol at home once per day, which was helping, but still had difficulty completing ADLs because of the pain.   Last fall was in October, when she landed on her left side. Denies new urinary or bowel retention or incontinence. chronic constipation and urinary incontinence with no new worsening. Has never had a DEXA. No previous history of fracture  In the ED, VSS. CT lumbar spine with New mild T12 compression fracture without bony retropulsion since prior CT abdomen and pelvis 10/6/2023, correlate for point tenderness. Mild to moderate spinal canal stenosis or neural foraminal narrowing L2-3 through L4-5. Seen by orthopedics who recommended conservative management with TLSO brace and calcium/vit D supplementation.      Patient was seen in room, OOB to chair with her brace on. she has back pain with movement but no pain with rest.    REVIEW OF SYSTEMS  Constitutional: No fever, No Chills, No fatigue  HEENT: No eye pain, No visual disturbances, No difficulty hearing  Pulm: No cough,  No shortness of breath  Cardio: No chest pain, No palpitations  GI:  No abdominal pain, No nausea, No vomiting, No diarrhea, No constipation  : No dysuria, No frequency, No hematuria  Neuro: No headaches, No memory loss, + loss of strength, no numbness, No tremors  Skin: No itching, No rashes, No lesions   MSK: No joint pain, No joint swelling, No muscle pain, No Neck pain, + back pain, + weakness   Psych:  No depression, No anxiety    PAST MEDICAL & SURGICAL HISTORY  Hypertension    Dyslipidemia    Parotid mass    Pituitary macroadenoma    Acromegaly    Calculus of gallbladder without cholecystitis without obstruction    Murmur    History of cervical discectomy    Neoplasm of pituitary gland    H/O thumb surgery        FAMILY HISTORY   Family history of colon cancer (Sibling)    Family history of brain tumor (Sibling)    Family history of lung cancer (Sibling)    SOCIAL HISTORY  Smoking - Denies  EtOH - Denies  Drugs - Denies    Marital status:     FUNCTIONAL HISTORY:   Patient lives in  alone. Multiple steps to enter with ~ 4 steps each. she has 1 FOS inside the house with chair lift.   PTA: Independent in ADLs and ambulation     CURRENT FUNCTIONAL STATUS  Date: 4/16  Bed Mobility: min a, 1 person  Transfers: min a, 1 person  Gait: min a, 1 person, 25ft with RW. TLSO brace  Upper Body Dressing: supervision   Lower Body Dressing: Max A, 1 person    RECENT LABS/IMAGING  CBC Full  -  ( 16 Apr 2024 05:48 )  WBC Count : 7.85 K/uL  RBC Count : 3.80 M/uL  Hemoglobin : 12.0 g/dL  Hematocrit : 35.7 %  Platelet Count - Automated : 278 K/uL  Mean Cell Volume : 93.9 fl  Mean Cell Hemoglobin : 31.6 pg  Mean Cell Hemoglobin Concentration : 33.6 gm/dL  Auto Neutrophil # : x  Auto Lymphocyte # : x  Auto Monocyte # : x  Auto Eosinophil # : x  Auto Basophil # : x  Auto Neutrophil % : x  Auto Lymphocyte % : x  Auto Monocyte % : x  Auto Eosinophil % : x  Auto Basophil % : x    04-16    136  |  102  |  41<H>  ----------------------------<  84  4.2   |  28  |  0.89    Ca    9.4      16 Apr 2024 05:48    TPro  6.0  /  Alb  2.6<L>  /  TBili  0.5  /  DBili  0.1  /  AST  20  /  ALT  17  /  AlkPhos  126<H>  04-16    Urinalysis Basic - ( 16 Apr 2024 05:48 )    Color: x / Appearance: x / SG: x / pH: x  Gluc: 84 mg/dL / Ketone: x  / Bili: x / Urobili: x   Blood: x / Protein: x / Nitrite: x   Leuk Esterase: x / RBC: x / WBC x   Sq Epi: x / Non Sq Epi: x / Bacteria: x    CT Lumbar Spine No Cont (04.14.24 @ 14:14)   IMPRESSION:    New mild T12 compression fracture without bony retropulsion since prior   CT abdomen and pelvis 10/6/2023, correlate for point tenderness.  Mild to moderate spinal canal stenosis or neural foraminal narrowing L2-3 through L4-5.      VITALS  T(C): 36.6 (04-16-24 @ 12:00), Max: 37.1 (04-16-24 @ 05:06)  HR: 66 (04-16-24 @ 12:00) (57 - 74)  BP: 109/66 (04-16-24 @ 12:00) (96/65 - 139/83)  RR: 16 (04-16-24 @ 12:00) (16 - 16)  SpO2: 97% (04-16-24 @ 12:00) (96% - 98%)  Wt(kg): --    ALLERGIES  No Known Allergies      MEDICATIONS   acetaminophen     Tablet .. 650 milliGRAM(s) Oral every 6 hours PRN  atorvastatin 20 milliGRAM(s) Oral at bedtime  calcium carbonate   1250 mG (OsCal) 1 Tablet(s) Oral two times a day  cholecalciferol 800 Unit(s) Oral daily  enoxaparin Injectable 40 milliGRAM(s) SubCutaneous every 24 hours  influenza  Vaccine (HIGH DOSE) 0.7 milliLiter(s) IntraMuscular once  levothyroxine 50 MICROGram(s) Oral daily  lisinopril 10 milliGRAM(s) Oral daily      ----------------------------------------------------------------------------------------  PHYSICAL EXAM  Gen - NAD, Comfortable  HEENT - NCAT, EOMI, MMM  Neck - Supple, No limited ROM  Pulm - CTAB, No wheeze, No rhonchi, No crackles  Cardiovascular - RRR, S1S2, No murmurs  Abdomen - Soft, NT/ND, +BS  Extremities - No clubbing, no cyanosis, no peripheral edema, no calf tenderness  Neuro-     Cognitive - Awake, Alert, Oriented  to self, place, date, year, situation. Able  to follow command     Communication - Fluent, Comprehensible     Attention: Intact      Memory: Recall 3 objects immediate and 3 min later         Cranial Nerves - CN 2-12 intact     Motor -                    LEFT    UE - 4/5                    RIGHT UE - 4/5                    LEFT    LE - 3/5                    RIGHT LE - 3/5        Sensory - Intact to LT     Reflexes - DTR Intact, No primitive reflexive     Coordination - FTN intact     Tone - normal  Psychiatric - Mood stable, Affect WNL  Skin:  all skin intact                   
HPI:  89yo female with medical h/o HTN, Dyslipidemia, Hypothyroid, cervical fusion  presenting with back pain x1 week. The pain is progressive located in the lower back, which has been Was moving cases of water some time last week, but doesn't remember a definitive injury. Seen by home service and given Tramadol at home once per day, which was helping, but still had difficulty completing ADLs because of the pain.   Last fall was in October . Denies new urinary or bowel retention or incontinence. - chronic constipation and urinary incontinence with no new worsening  Has never had a DEXA. No previous history of fracture  In the ED, VSS. CT lumbar spine with New mild T12 compression fracture without bony retropulsion since prior CT abdomen and pelvis 10/6/2023, correlate for point tenderness. Mild to moderate spinal canal stenosis or neural foraminal narrowing L2-3 through L4-5. Janusz CABRERA personally reviewed  (14 Apr 2024 17:29)    Orthopedics was consulted for further evaluation and recommendations.   Pt seen and examined at bedside.  Resting comfortably in bed. Pain controlled.  No new complaints or acute events overnight per RN.  Pt states she is unable to bear weight/ stand without assistance due to instability and pain.  Pt denies incontinence, parasthesia or paralysis to lower extremities.  No other associated symptoms.      PAST MEDICAL & SURGICAL HISTORY:  Hypertension      Dyslipidemia      Parotid mass  left. s/p biopsy ("inconclusive) instructed to have repeat scan done in March 2015.being monitored by Dr. Sung Oscar (ENT)      Pituitary macroadenoma      Acromegaly      Calculus of gallbladder without cholecystitis without obstruction      Murmur  cardiac      History of cervical discectomy  and fusion in 2007      Neoplasm of pituitary gland      H/O thumb surgery  left    MEDICATIONS  (STANDING):  atorvastatin 20 milliGRAM(s) Oral at bedtime  calcium carbonate   1250 mG (OsCal) 1 Tablet(s) Oral two times a day  cholecalciferol 800 Unit(s) Oral daily  enoxaparin Injectable 40 milliGRAM(s) SubCutaneous every 24 hours  influenza  Vaccine (HIGH DOSE) 0.7 milliLiter(s) IntraMuscular once  levothyroxine 50 MICROGram(s) Oral daily  lisinopril 10 milliGRAM(s) Oral daily    MEDICATIONS  (PRN):    Home Medications:  atorvastatin 20 mg oral tablet: 1 tab(s) orally once a day (at bedtime) (14 Apr 2024 18:39)  fosinopril 10 mg oral tablet: 1 tab(s) orally once a day (at bedtime) (14 Apr 2024 18:39)  Synthroid 50 mcg (0.05 mg) oral tablet: 1 tab(s) orally once a day (14 Apr 2024 18:39)    Allergies    No Known Allergies    Intolerances    PE:  Vital Signs Last 24 Hrs  T(C): 36.3 (15 Apr 2024 05:15), Max: 36.4 (14 Apr 2024 13:22)  T(F): 97.4 (15 Apr 2024 05:15), Max: 97.6 (14 Apr 2024 20:40)  HR: 56 (15 Apr 2024 05:15) (56 - 73)  BP: 153/88 (15 Apr 2024 05:15) (119/71 - 153/88)  BP(mean): 84 (14 Apr 2024 17:26) (84 - 84)  RR: 18 (15 Apr 2024 05:15) (17 - 19)  SpO2: 96% (15 Apr 2024 05:15) (96% - 98%)  Social History:  Lives alone at home, no help.   Some ADLs has help with. All iADLs requires assistance (14 Apr 2024 17:29)    Parameters below as of 15 Apr 2024 05:15  Patient On (Oxygen Delivery Method): room air      General:NAD, well-nourished, well-appearing  Lungs:     CTA b/l, no w/r/r  CVS:     S1S2, RRR, no m/g/r  Abd:     +BS, s/nt/nd, no organomegaly  Ext:    2+ radial and pedal pulses, no c/c/e  Neuro: AOx3, no sensory/motor deficits   MSK–positive paraspinal lumbar tenderness positive straight leg raising test on the left side NVI +DP pulses bilat FROM ankles and knees unable to assess rom of lumbar spine bc of pain                          11.9   6.12  )-----------( 243      ( 15 Apr 2024 06:33 )             36.2   04-15    141  |  104  |  38<H>  ----------------------------<  58<L>  3.7   |  25  |  0.79    Ca    9.1      15 Apr 2024 06:33    TPro  6.2  /  Alb  2.7<L>  /  TBili  1.3<H>  /  DBili  x   /  AST  22  /  ALT  19  /  AlkPhos  115  04-15    < from: CT Lumbar Spine No Cont (04.14.24 @ 14:14) >  ACC: [id number] EXAM:  CT LUMBAR SPINE   ORDERED BY: CARRIE MAYO     PROCEDURE DATE:  04/14/2024          INTERPRETATION:  PROCEDURE: CT lumbar spine without contrast    INDICATION: Lumbar radiculopathy    TECHNIQUE:  Multiple axial sections were obtained from the thoracolumbar   junction through the sacrum. Sagittal and coronal reformats were obtained   from the axial data set. The images were reviewed in soft tissue and bone   windows.    COMPARISON: MRI brain lumbar spine 5/4/2022 and CT abdomen and pelvis   10/6/2023    FINDINGS: There is levoscoliosis of the lower thoracic and lumbar spine.   The bones are osteopenic. There is a new mild T12 compression fracture.   The remainder the vertebral body heights are unchanged from prior exam..  The disc spaces are preserved..  Stable 4 cm right adnexal cyst.    At the L1-L2 level there is ligamentum flavum hypertrophy without   significant spinal canal stenosis or neural foraminal narrowing.    At the L2-L3 level there is a disc bulge with facet hypertrophy resulting   in mild to moderate spinal canal stenosis and bilateral neural foraminal   narrowing    At the L3-L4 level there is a disc bulge with facet hypertrophy resulting   in mild to moderate spinal canal stenosis with mild bilateral neural   foraminal narrowing.      At the L4-L5 level there is uncovering of the posterior disc margin with   facet and ligamentum flavum hypertrophy resulting in mild to moderate   spinal canal stenosis and bilateral neural foraminal narrowing.    At the L5-S1 level there is no significant disc herniation, spinal canal   stenosis or neural foraminal narrowing.    IMPRESSION:    New mild T12 compression fracture without bony retropulsion since prior   CT abdomen and pelvis 10/6/2023, correlate for point tenderness.    Mild to moderate spinal canal stenosis or neural foraminal narrowing L2-3   through L4-5.    --- End of Report ---            VICTORINA NAGY MD; Attending Radiologist  This document has been electronically signed. Apr14 2024  2:29PM    < end of copied text >    
HPI: 88yo female with medical h/o HTN, Dyslipidemia, Hypothyroid, cervical fusion  presenting with back pain x1 week. The pain is progressive located in the lower back, which has been Was moving cases of water some time last week, but doesn't remember a definitive injury. Seen by home service and given Tramadol at home once per day, which was helping, but still had difficulty completing ADLs because of the pain.   Last fall was in October .   Denies new urinary or bowel retention or incontinence. - chronic constipation and urinary incontinence with no new worsening  Has never had a DEXA. No previous history of fracture    In the ED, VSS. CT lumbar spine with New mild T12 compression fracture without bony retropulsion since prior CT abdomen and pelvis 10/6/2023, correlate for point tenderness. Mild to moderate spinal canal stenosis or neural foraminal narrowing L2-3 through L4-5.    Janusz CABRERA personally reviewed   (14 Apr 2024 17:29)      PAST MEDICAL & SURGICAL HISTORY:  Hypertension      Dyslipidemia      Parotid mass  left. s/p biopsy ("inconclusive) instructed to have repeat scan done in March 2015.being monitored by Dr. Sung Oscar (ENT)      Pituitary macroadenoma      Acromegaly      Calculus of gallbladder without cholecystitis without obstruction      Murmur  cardiac      History of cervical discectomy  and fusion in 2007      Neoplasm of pituitary gland      H/O thumb surgery  left          SOCIAL HISTORY:    Admitted from:  home    Substance abuse history:              Tobacco hx:                  Alcohol hx:              Home Opioid hx:  Yazdanism:                                    Preferred Language:    Surrogate/HCP/:      son Josué  [phone]          FAMILY HISTORY:  Family history of colon cancer (Sibling)    Family history of brain tumor (Sibling)    Family history of lung cancer (Sibling)      Baseline ADLs (prior to admission):    Allergies    No Known Allergies    Intolerances      Present Symptoms:   Dyspnea: no  Nausea/Vomiting: no  Anxiety:no  Depressed   Fatigue:no  Loss of appetite: no  Pain:             yes, when I move                    location:     back      Review of Systems: [All others negative MEDICATIONS  (STANDING):  atorvastatin 20 milliGRAM(s) Oral at bedtime  calcium carbonate   1250 mG (OsCal) 1 Tablet(s) Oral two times a day  cholecalciferol 800 Unit(s) Oral daily  enoxaparin Injectable 40 milliGRAM(s) SubCutaneous every 24 hours  influenza  Vaccine (HIGH DOSE) 0.7 milliLiter(s) IntraMuscular once  levothyroxine 50 MICROGram(s) Oral daily  lisinopril 10 milliGRAM(s) Oral daily    MEDICATIONS  (PRN):      PHYSICAL EXAM:    Vital Signs Last 24 Hrs  T(C): 36.3 (15 Apr 2024 05:15), Max: 36.4 (14 Apr 2024 13:22)  T(F): 97.4 (15 Apr 2024 05:15), Max: 97.6 (14 Apr 2024 20:40)  HR: 56 (15 Apr 2024 05:15) (56 - 73)  BP: 153/88 (15 Apr 2024 05:15) (119/71 - 153/88)  BP(mean): 84 (14 Apr 2024 17:26) (84 - 84)  RR: 18 (15 Apr 2024 05:15) (17 - 19)  SpO2: 96% (15 Apr 2024 05:15) (96% - 98%)    Parameters below as of 15 Apr 2024 05:15  Patient On (Oxygen Delivery Method): room air        General: alert  oriented x _3/4 , converses   Karnofsky Performance Score/Palliative Performance Status Version2:    70 %  PPSV: 70%  HEENT: normal  dry mouth , glasses   Lungs: clear, resp non labored , comfortable   CV: normal rate   GI: normal, abd soft, n/t    : normal w/ prima fit   Musculoskeletal: normal  w/ weakness , no  edema   Skin: nml, pale,   Neuro: no deficits., awake, alert, oriented, converses   Oral intake ability:  full capability  Diet: reg     LABS:                        11.9   6.12  )-----------( 243      ( 15 Apr 2024 06:33 )             36.2     04-15    141  |  104  |  38<H>  ----------------------------<  58<L>  3.7   |  25  |  0.79    Ca    9.1      15 Apr 2024 06:33    TPro  6.2  /  Alb  2.7<L>  /  TBili  1.3<H>  /  DBili  x   /  AST  22  /  ALT  19  /  AlkPhos  115  04-15    Urinalysis Basic - ( 15 Apr 2024 06:33 )    Color: x / Appearance: x / SG: x / pH: x  Gluc: 58 mg/dL / Ketone: x  / Bili: x / Urobili: x   Blood: x / Protein: x / Nitrite: x   Leuk Esterase: x / RBC: x / WBC x   Sq Epi: x / Non Sq Epi: x / Bacteria: x        RADIOLOGY & ADDITIONAL STUDIES:    ADVANCE DIRECTIVES:  HCP full  code   Advanced Care Planning discussion total time spent:

## 2024-04-16 NOTE — CHART NOTE - NSCHARTNOTEFT_GEN_A_CORE
Spoke to son regarding dc to [place].   Mr. Diaz notes that the Physiatry NP mentioned a parotid mass to him.   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   I called Mr. Diaz back to explain that this is something that should be followed up with ENT. He said that the mass was already biopsied many years ago and is nothing dangerous.   I informed him that we included the contact info for an ENT for them to follow up with.
Pt seen at bedside with physical therapist to measure and fit with TLSO to be used OOB for relief of pain following compression fx T12.  Pt able to don orthosis and stand using rolling walker. She then proceeded to the toilet with PT .   Pt returned to bed and orthosis given final adjustments.  Abhilash instructions and office conract info provided  Follow up if needed      Manuel Damico CO  [phone]

## 2024-04-16 NOTE — DISCHARGE NOTE PROVIDER - NPI NUMBER (FOR SYSADMIN USE ONLY) :
[[id number] [[id number]],[[id number]],[[id number]] [[id number]],[[id number]],[[id number]]

## 2024-04-16 NOTE — OCCUPATIONAL THERAPY INITIAL EVALUATION ADULT - ADL RETRAINING, OT EVAL
Pt will perform LE dressing with minimal assist using AE PRN to maintain spinal precautions in 3 treatment sessions.

## 2024-04-16 NOTE — CONSULT NOTE ADULT - ASSESSMENT
------------------------------------------------------------------------------------------------  ASSESSMENT/PLAN  This is a 88 YO female with PMH of HTN, Dyslipidemia, Hypothyroid, cervical fusion who presented to [facility] on 4/14 with back pain x 1 week that has been progressive. CT lumbar spine with new mild T12 compression fracture without bony retropulsion. Seen by ortho who recommended conservative management with TLSO brace and calcium/vit D supplementation.      Medical management per hospitalist  Pain management -Tylenol  DVT PPX : SCDs, lovenox  TLSO brace  Osteoporosis: Vit D, Oscal  Diet:DASH/TLC    Rehab Disposition: - Acute Inpatient Rehab     Recommend ACUTE inpatient rehabilitation for the functional deficits consisting of 3 hours of PT/OT.  Patient has significant functional impairments and would benefit from continued rehabilitation in the ACUTE inpatient rehab setting. Patient has both medical and functional needs appropriate for acute inpatient rehabilitation and would benefit from comprehensive interdisciplinary care, including a physiatrist, rehabilitation nursing, PT, OT and case management/social work. We anticipate that he would be able to tolerate 3 hours of PT/OT per day for 5 to 6 days per week to focus on mobility, transfers, gait training with assistive devices, ADL training,  cognition, and patient education/safety.                
84yo female with medical h/o HTN, HLD, Hypothyroid presenting with back pain, admitted for T12 compression fracture back pain due to T12 compression fracture causing ambulatory dysfunction  newly diagnosed osteoporosis    -ortho consult - ED attending spoke with Dr. Meeta Milton to follow case  -PT  -pain control   -TLSO brace Tray cottrell  orthotist consulted by FP resident  -Calcium and vitamin D supplement
A/P 86yo female with medical h/o HTN, HLD, Hypothyroid presenting with back pain, admitted for T12 compression fracture         back pain due to T12 compression fracture causing ambulatory dysfunction  newly diagnosed osteoporosis  CT scan findings as above  -ortho consult - ED attending spoke with Dr. Tim  -PT consult  -pain control - was with IV tylenol/lidocaine patch, med team is consider adding topical NSAID  -TLSO brace  -Calcium and vitamin D supplement    - consider trial Prn oxy for pain prior to PT       FULL CODE- Pt confirmed these wishes today       Palliative :      Asked for GOC  assist , chart reviewed, I saw pt bedside today , she is very awake, alert and oriented, converses. Pt aware of her hx and why she is here.   Pt reports pain only when she moves , says she was given iv tylenol and tramadol.    Ortho consult - pending   Pt eval   Pt confirmed her wishes as Full  Code    has HCP in place son Josué.       No palliative recs   will sign off as GOC established and confirmed w/ pt today .

## 2024-04-16 NOTE — DISCHARGE NOTE PROVIDER - CARE PROVIDERS DIRECT ADDRESSES
,[email or web address]

## 2024-04-16 NOTE — PROGRESS NOTE ADULT - ATTENDING COMMENTS
89yo female with medical h/o HTN, HLD, Hypothyroid presenting with back pain, admitted for T12 compression pathological fracture PLan: aprec ortho recs, apprec PT eval-for acute rehab, PMR consult recs for acute rehab, OT consult apprec, monitor clinical course, pain control, TLSO brace at bedside
89yo female with medical h/o HTN, HLD, Hypothyroid presenting with back pain, admitted for T12 compression pathological fracture PLan: aprec ortho recs, apprec PT eval-for acute rehab, PMR consult, OT consult, monitor clinical course, pain control

## 2024-04-16 NOTE — PROGRESS NOTE ADULT - TIME BILLING
care coordination, plan of care discussed with patient face to face, 3E IDR team /hcp
care coordination, plan of care discussed with erik fac eot face, 3E IDR team, ortho team

## 2024-04-16 NOTE — DISCHARGE NOTE PROVIDER - CARE PROVIDER_API CALL
Tuyet Montgomery  [address] [address]  Phone: [phone]  Fax: [phone]  Follow Up Time: 1 week    John Milton  Orthopaedic Surgery  [address]  [place] [address]  Phone: [phone]  Fax: [phone]  Follow Up Time: 1 week   Clothing Tuyet Montgomery  [address] [address]  Phone: [phone]  Fax: [phone]  Follow Up Time: 1 week    John Milton  Orthopaedic Surgery  [address]  [place] [address]  Phone: [phone]  Fax: [phone]  Follow Up Time: 1 week    Rudy Ross  Otolaryngology  [address] [place] [address]  Phone: [phone]  Fax: [phone]  Follow Up Time:    Tuyet Montgomery  [facility]  [address] [place] [address]  Phone: [phone]  Fax: [phone]  Follow Up Time: 1 week    John Milton  Orthopaedic Surgery  [address]  [place] [address]  Phone: [phone]  Fax: [phone]  Follow Up Time: 1 week    Rudy Ross  Otolaryngology  [address] [place] [address]  Phone: [phone]  Fax: [phone]  Follow Up Time:     Dat Langford  Cardiology  [address]  [place] [address]  Phone: [phone]  Fax: [phone]  Follow Up Time: 2 weeks

## 2024-04-16 NOTE — OCCUPATIONAL THERAPY INITIAL EVALUATION ADULT - PERTINENT HX OF CURRENT PROBLEM, REHAB EVAL
89 yo female with medical h/o HTN, Dyslipidemia, Hypothyroid, cervical fusion  presenting with back pain x1 week. The pain is progressive located in the lower back. Pt was moving cases of water some time last week, but doesn't remember a definitive injury. Seen by home service and given Tramadol at home once per day, which was helping, but still had difficulty completing ADLs because of the pain.   Last fall was in October.  No previous history of fracture  CT lumbar spine with New mild T12 compression fracture without bony retropulsion since prior CT abdomen and pelvis 10/6/2023, correlate for point tenderness. Mild to moderate spinal canal stenosis or neural foraminal narrowing L2-3.

## 2024-04-16 NOTE — DISCHARGE NOTE PROVIDER - ATTENDING DISCHARGE PHYSICAL EXAMINATION:
GENERAL: NAD, lying in bed comfortably, elder  HEAD:  Atraumatic, Normocephalic  EYES:  PERRLA, conjunctiva and sclera clear  ENT: Moist mucous membranes  NECK: Supple  CHEST/LUNG: Clear to auscultation bilaterally, good air entry bilaterally; No wheezing, rales, or rhonchi. Unlabored respirations  HEART: Regular rate and rhythm. S1 and S2. +faint systolic murmur  ABDOMEN: Soft, Nontender, Nondistended. Bowel sounds present x4 quadrants  EXTREMITIES:  2+ Peripheral Pulses. Capillary refill <2 seconds. No clubbing, cyanosis, or edema  NERVOUS SYSTEM:  Alert & Oriented X3, speech clear. No deficits   MSK: limited mobility due to pain but strength full and intact GENERAL: NAD, lying in bed comfortably, elder  HEAD:  Atraumatic, Normocephalic  EYES:  conjunctiva and sclera clear  ENT: Moist mucous membranes  NECK: Supple  CHEST/LUNG: Clear to auscultation bilaterally, good air entry bilaterally; No wheezing, rales, or rhonchi. Unlabored respirations  HEART: Regular rate and rhythm. S1 and S2. +faint systolic murmur  ABDOMEN: Soft, Nontender, Nondistended. Bowel sounds present x4 quadrants  EXTREMITIES:  2+ Peripheral Pulses. Capillary refill <2 seconds. No clubbing, cyanosis, or edema  NERVOUS SYSTEM:  Alert & Oriented X3, speech clear. No deficits   MSK: limited mobility due to pain but strength full and intact

## 2024-04-17 ENCOUNTER — INPATIENT (INPATIENT)
Facility: HOSPITAL | Age: 89
LOS: 16 days | Discharge: HOME HEALTH | DRG: 552 | End: 2024-05-04
Attending: PHYSICAL MEDICINE & REHABILITATION | Admitting: PHYSICAL MEDICINE & REHABILITATION
Payer: MEDICARE

## 2024-04-17 ENCOUNTER — TRANSCRIPTION ENCOUNTER (OUTPATIENT)
Age: 89
End: 2024-04-17

## 2024-04-17 VITALS
HEART RATE: 70 BPM | DIASTOLIC BLOOD PRESSURE: 74 MMHG | RESPIRATION RATE: 17 BRPM | TEMPERATURE: 98 F | OXYGEN SATURATION: 95 % | SYSTOLIC BLOOD PRESSURE: 118 MMHG

## 2024-04-17 VITALS
RESPIRATION RATE: 18 BRPM | TEMPERATURE: 98 F | SYSTOLIC BLOOD PRESSURE: 123 MMHG | HEART RATE: 67 BPM | OXYGEN SATURATION: 67 % | DIASTOLIC BLOOD PRESSURE: 79 MMHG

## 2024-04-17 DIAGNOSIS — Z98.89 OTHER SPECIFIED POSTPROCEDURAL STATES: Chronic | ICD-10-CM

## 2024-04-17 DIAGNOSIS — D49.7 NEOPLASM OF UNSPECIFIED BEHAVIOR OF ENDOCRINE GLANDS AND OTHER PARTS OF NERVOUS SYSTEM: Chronic | ICD-10-CM

## 2024-04-17 DIAGNOSIS — S22.089A UNSPECIFIED FRACTURE OF T11-T12 VERTEBRA, INITIAL ENCOUNTER FOR CLOSED FRACTURE: ICD-10-CM

## 2024-04-17 DIAGNOSIS — Z98.890 OTHER SPECIFIED POSTPROCEDURAL STATES: Chronic | ICD-10-CM

## 2024-04-17 PROCEDURE — 99239 HOSP IP/OBS DSCHRG MGMT >30: CPT

## 2024-04-17 RX ORDER — ENOXAPARIN SODIUM 100 MG/ML
40 INJECTION SUBCUTANEOUS EVERY 24 HOURS
Refills: 0 | Status: DISCONTINUED | OUTPATIENT
Start: 2024-04-17 | End: 2024-05-04

## 2024-04-17 RX ORDER — LEVOTHYROXINE SODIUM 125 MCG
50 TABLET ORAL DAILY
Refills: 0 | Status: DISCONTINUED | OUTPATIENT
Start: 2024-04-18 | End: 2024-05-04

## 2024-04-17 RX ORDER — ATORVASTATIN CALCIUM 80 MG/1
20 TABLET, FILM COATED ORAL AT BEDTIME
Refills: 0 | Status: DISCONTINUED | OUTPATIENT
Start: 2024-04-17 | End: 2024-05-04

## 2024-04-17 RX ORDER — SENNA PLUS 8.6 MG/1
2 TABLET ORAL AT BEDTIME
Refills: 0 | Status: DISCONTINUED | OUTPATIENT
Start: 2024-04-17 | End: 2024-05-04

## 2024-04-17 RX ORDER — POLYETHYLENE GLYCOL 3350 17 G/17G
17 POWDER, FOR SOLUTION ORAL DAILY
Refills: 0 | Status: DISCONTINUED | OUTPATIENT
Start: 2024-04-18 | End: 2024-05-04

## 2024-04-17 RX ORDER — ACETAMINOPHEN 500 MG
650 TABLET ORAL EVERY 6 HOURS
Refills: 0 | Status: DISCONTINUED | OUTPATIENT
Start: 2024-04-17 | End: 2024-04-18

## 2024-04-17 RX ORDER — POLYETHYLENE GLYCOL 3350 17 G/17G
17 POWDER, FOR SOLUTION ORAL DAILY
Refills: 0 | Status: DISCONTINUED | OUTPATIENT
Start: 2024-04-17 | End: 2024-04-17

## 2024-04-17 RX ORDER — CHOLECALCIFEROL (VITAMIN D3) 125 MCG
800 CAPSULE ORAL DAILY
Refills: 0 | Status: DISCONTINUED | OUTPATIENT
Start: 2024-04-18 | End: 2024-04-23

## 2024-04-17 RX ORDER — CALCIUM CARBONATE 500(1250)
1 TABLET ORAL
Refills: 0 | Status: DISCONTINUED | OUTPATIENT
Start: 2024-04-17 | End: 2024-05-04

## 2024-04-17 RX ORDER — LISINOPRIL 2.5 MG/1
10 TABLET ORAL DAILY
Refills: 0 | Status: DISCONTINUED | OUTPATIENT
Start: 2024-04-18 | End: 2024-04-19

## 2024-04-17 RX ADMIN — LISINOPRIL 10 MILLIGRAM(S): 2.5 TABLET ORAL at 05:18

## 2024-04-17 RX ADMIN — POLYETHYLENE GLYCOL 3350 17 GRAM(S): 17 POWDER, FOR SOLUTION ORAL at 11:38

## 2024-04-17 RX ADMIN — ATORVASTATIN CALCIUM 20 MILLIGRAM(S): 80 TABLET, FILM COATED ORAL at 20:09

## 2024-04-17 RX ADMIN — ENOXAPARIN SODIUM 40 MILLIGRAM(S): 100 INJECTION SUBCUTANEOUS at 20:09

## 2024-04-17 RX ADMIN — Medication 800 UNIT(S): at 11:37

## 2024-04-17 RX ADMIN — Medication 50 MICROGRAM(S): at 05:18

## 2024-04-17 NOTE — PATIENT PROFILE ADULT - VISION (WITH CORRECTIVE LENSES IF THE PATIENT USUALLY WEARS THEM):
macular degenration/Partially impaired: cannot see medication labels or newsprint, but can see obstacles in path, and the surrounding layout; can count fingers at arm's length

## 2024-04-17 NOTE — DISCHARGE NOTE NURSING/CASE MANAGEMENT/SOCIAL WORK - PATIENT PORTAL LINK FT
You can access the FollowMyHealth Patient Portal offered by [facility] by registering at the following website: [email or web address]. By joining [facility]’s FollowMyHealth portal, you will also be able to view your health information using other applications (apps) compatible with our system.

## 2024-04-17 NOTE — PATIENT PROFILE ADULT - FUNCTIONAL ASSESSMENT - BASIC MOBILITY 6.
2-calculated by average/Not able to assess (calculate score using [facility] averaging method)

## 2024-04-17 NOTE — H&P ADULT - REASON FOR ADMISSION
Thoracic spine compression fracture Traumatic thoracolumbar spine injuries and thoracic spine compression fracture

## 2024-04-17 NOTE — H&P ADULT - HISTORY OF PRESENT ILLNESS
This is a 88 YO female with PMH of HTN, Dyslipidemia, Hypothyroid, cervical fusion who presented to [facility] on 4/14 with back pain x1 week. The pain is progressive located in the lower back, which has been Was moving cases of water some time last week, but doesn't remember a definitive injury. Seen by home service and given Tramadol at home once per day, which was helping, but still had difficulty completing ADLs because of the pain. Last fall was in October, when she landed on her left side. Denies new urinary or bowel retention or incontinence. chronic constipation and urinary incontinence with no new worsening. Has never had a DEXA. No previous history of fracture.    In the ED, VSS. CT lumbar spine with New mild T12 compression fracture without bony retropulsion since prior CT abdomen and pelvis 10/6/2023, correlate for point tenderness. Mild to moderate spinal canal stenosis or neural foraminal narrowing L2-3 through L4-5. Seen by orthopedics who recommended conservative management with TLSO brace and calcium/vit D supplementation.     Patient was evaluated by PM&R and therapy for functional deficits, gait/ADL impairments and acute rehabilitation was recommended. Patient was medically optimized for discharge to [facility]U on 4/17/24. Mrs. Nita Diaz is an 89-year-old female patient with PMH of HTN, Dyslipidemia, Hypothyroidism, and cervical fusion who presented to [facility] on 4/14 with back pain of one week from onset. The pain was progressive and located in the lower back, which occurred as she was moving cases of water at some time in preceding week, but doesn't remember a definitive sudden sensation of injury. She was seen by home service and given Tramadol at home once per day, which was helping, but still had difficulty completing ADLs because of the pain. Recent history additionally remarkable for a fall in October in which she landed on her left side. She denied any new urinary or bowel retention or incontinence. and has chronic constipation and urinary incontinence with no new worsening. Has never had a DEXA and has no previous history of fractures.    In the ED, VSS. CT lumbar spine with New mild T12 compression fracture without bony retropulsion since prior CT abdomen and pelvis 10/6/2023, correlate for point tenderness. Mild to moderate spinal canal stenosis or neural foraminal narrowing L2-3 through L4-5. Seen by orthopedics who recommended conservative management with TLSO brace and calcium/vit D supplementation.     Patient was evaluated by PM&R and therapy for functional deficits, gait/ADL impairments and acute rehabilitation was recommended. Patient was medically optimized for discharge to [facility] on 4/17/24.

## 2024-04-17 NOTE — H&P ADULT - NSHPSOCIALHISTORY_GEN_ALL_CORE
Smoking - Denies  EtOH - Denies  Drugs - Denies    Marital status:     FUNCTIONAL HISTORY:   Patient lives in  alone. Multiple steps to enter with ~ 4 steps each. she has 1 FOS inside the house with chair lift.   PTA: Independent in ADLs and ambulation     CURRENT FUNCTIONAL STATUS  Date: 4/16  Bed Mobility: min a, 1 person  Transfers: min a, 1 person  Gait: min a, 1 person, 25ft with RW. TLSO brace  Upper Body Dressing: supervision   Lower Body Dressing: Max A, 1 person

## 2024-04-17 NOTE — PROGRESS NOTE ADULT - PROVIDER SPECIALTY LIST ADULT
"Chief Complaint   Patient presents with     Hypertension     Patient is here for high blood pressure that has increased this morning. Patient checked at [facility] this morning and it was 202/113 with pulse of 58 at 10:30AM    Initial BP (!) 206/110   Pulse 68   Temp (!) 96.7  F (35.9  C) (Tympanic)   Wt 100.9 kg (222 lb 8 oz)   SpO2 99%   BMI 26.73 kg/m   Estimated body mass index is 26.73 kg/m  as calculated from the following:    Height as of 1/24/23: 1.943 m (6' 4.5\").    Weight as of this encounter: 100.9 kg (222 lb 8 oz).  Medication Reconciliation: complete    Kathleen Joe LPN  "
Family Medicine
independent

## 2024-04-17 NOTE — H&P ADULT - NSHPLABSRESULTS_GEN_ALL_CORE
RECENT LABS/IMAGING                        12.0   7.85  )-----------( 278      ( 16 Apr 2024 05:48 )             35.7     04-16    136  |  102  |  41<H>  ----------------------------<  84  4.2   |  28  |  0.89    Ca    9.4      16 Apr 2024 05:48    TPro  6.0  /  Alb  2.6<L>  /  TBili  0.5  /  DBili  0.1  /  AST  20  /  ALT  17  /  AlkPhos  126<H>  04-16      Urinalysis Basic - ( 16 Apr 2024 05:48 )    Color: x / Appearance: x / SG: x / pH: x  Gluc: 84 mg/dL / Ketone: x  / Bili: x / Urobili: x   Blood: x / Protein: x / Nitrite: x   Leuk Esterase: x / RBC: x / WBC x   Sq Epi: x / Non Sq Epi: x / Bacteria: x       CT Lumbar Spine No Cont (04.14.24 @ 14:14)     IMPRESSION:    New mild T12 compression fracture without bony retropulsion since prior . CT abdomen and pelvis 10/6/2023, correlate for point tenderness.  Mild to moderate spinal canal stenosis or neural foraminal narrowing L2-3 through L4-5.

## 2024-04-17 NOTE — PROGRESS NOTE ADULT - SUBJECTIVE AND OBJECTIVE BOX
Subjective and Objective:   89yo female with medical h/o HTN, HLD, Hypothyroid presenting with back pain, admitted for T12 compression fracture    Overnight Events: None  Interval History: Patient was seen and examined by me this morning at bedside. Says she has trouble urinating with primafit. Continues to have back pain    REVIEW OF SYSTEMS:  CONSTITUTIONAL: No weakness, fevers or chills  EYES/ENT: No visual changes;  No vertigo or throat pain   NECK: No pain or stiffness  RESPIRATORY: No cough, wheezing, hemoptysis; No shortness of breath  CARDIOVASCULAR: No chest pain or palpitations  GASTROINTESTINAL: No abdominal or epigastric pain. No nausea, vomiting; No diarrhea or constipation.   GENITOURINARY: No dysuria, frequency or hematuria  NEUROLOGICAL: No numbness or weakness  SKIN: No itching, burning, rashes, or lesions       Vital Signs Last 24 Hrs  T(C): 36.5 (17 Apr 2024 05:14), Max: 36.6 (16 Apr 2024 12:00)  T(F): 97.7 (17 Apr 2024 05:14), Max: 97.9 (16 Apr 2024 12:00)  HR: 56 (17 Apr 2024 05:14) (56 - 74)  BP: 136/78 (17 Apr 2024 05:14) (102/63 - 139/83)  BP(mean): --  RR: 16 (17 Apr 2024 05:14) (16 - 17)  SpO2: 97% (17 Apr 2024 05:14) (96% - 97%)    Parameters below as of 17 Apr 2024 05:14  Patient On (Oxygen Delivery Method): room air    MEDICATIONS:  MEDICATIONS  (STANDING):  atorvastatin 20 milliGRAM(s) Oral at bedtime  calcium carbonate   1250 mG (OsCal) 1 Tablet(s) Oral two times a day  cholecalciferol 800 Unit(s) Oral daily  enoxaparin Injectable 40 milliGRAM(s) SubCutaneous every 24 hours  influenza  Vaccine (HIGH DOSE) 0.7 milliLiter(s) IntraMuscular once  levothyroxine 50 MICROGram(s) Oral daily  lisinopril 10 milliGRAM(s) Oral daily    Physical Exam  GENERAL: NAD, lying in bed comfortably  HEAD:  Atraumatic, Normocephalic  EYES:  PERRLA, conjunctiva and sclera clear  ENT: Moist mucous membranes  NECK: Supple  CHEST/LUNG: Clear to auscultation bilaterally, good air entry bilaterally; No wheezing, rales, or rhonchi. Unlabored respirations  HEART: Regular rate and rhythm. S1 and S2. +faint systolic murmur  ABDOMEN: Soft, Nontender, Nondistended. Bowel sounds present x4 quadrants  EXTREMITIES:  2+ Peripheral Pulses. Capillary refill <2 seconds. No clubbing, cyanosis, or edema  NERVOUS SYSTEM:  Alert & Oriented X3, speech clear. No deficits   MSK: FROM all 4 extremities, full and equal strength  SKIN: No rashes, bruises, or other lesions visible on back abdomen, chest or feet      LABS: All Labs Reviewed:      No new labs today      RADIOLOGY/EKG (personally reviewed):  < from: CT Lumbar Spine No Cont (04.14.24 @ 14:14) >  IMPRESSION:    New mild T12 compression fracture without bony retropulsion since prior   CT abdomen and pelvis 10/6/2023, correlate for point tenderness.    Mild to moderate spinal canal stenosis or neural foraminal narrowing L2-3   through L4-5.    < end of copied text >

## 2024-04-17 NOTE — PATIENT PROFILE ADULT - DOES PATIENT HAVE ADVANCE DIRECTIVE
Please inform pt that she needs to schedule an appt for medication refill. Has not been seen by dr. avila since 2/2018   Yes

## 2024-04-17 NOTE — DISCHARGE NOTE NURSING/CASE MANAGEMENT/SOCIAL WORK - NSDCPEFALRISK_GEN_ALL_CORE
For information on Fall & Injury Prevention, visit: [email or web address] OR  [email or web address] OR  https://www.cdc.gov/steadi/patient.html

## 2024-04-17 NOTE — H&P ADULT - NSHPPHYSICALEXAM_GEN_ALL_CORE
PHYSICAL EXAM  VITALS  T(C): 36.4 (04-17-24 @ 13:56), Max: 36.5 (04-17-24 @ 05:14)  HR: 67 (04-17-24 @ 13:56) (56 - 74)  BP: 123/79 (04-17-24 @ 13:56) (102/63 - 136/78)  RR: 18 (04-17-24 @ 13:56) (16 - 18)  SpO2: 67% (04-17-24 @ 13:56) (67% - 97%)    Gen - NAD, Comfortable  HEENT - NCAT, EOMI, MMM  Neck - Supple, No limited ROM  Pulm - CTAB, No wheeze, No rhonchi, No crackles  Cardiovascular - RRR, S1S2, + murmurs  Abdomen - Soft, NT/ND, +BS  Extremities - No clubbing, no cyanosis, no peripheral edema, no calf tenderness  Neuro-     Cognitive - Awake, Alert, Oriented  to self, place, date, year, situation. Able  to follow command     Communication - Fluent, Comprehensible     Attention: Intact      Memory: Recall 3 objects immediate and 3 min later         Cranial Nerves - CN 2-12 intact     Motor -                    LEFT    UE - 4/5                    RIGHT UE - 4/5                    LEFT    LE - 3/5                    RIGHT LE - 3/5        Sensory - Intact to LT     Reflexes - DTR Intact, No primitive reflexive     Coordination - FTN intact     Tone - normal  Psychiatric - Mood stable, Affect WNL  Skin:  all skin intact

## 2024-04-17 NOTE — H&P ADULT - NS ATTEND AMEND GEN_ALL_CORE FT
I independently performed the documented the history, exam, and medical decision making. I have made amendments to the documentation where necessary. I have personally seen and examined the patient. Medical records were reviewed and I have made amendments to the documentation where necessary and adjusted the history, physical examination, and plan as documented by the Nurse Practitioner. Patient was seen and evaluated at bedside today. Reported no overnight events and is in no acute distress. Eager to participate on the recommended rehabilitation program. Denies any CP, SOB, GONZALES, palpitations, fever, chills, body aches, cough, congestion, or any other symptoms at this time. Admission vitals, labs, and physical exam are outlined below.    LAB                        13.2   8.20  )-----------( 282      ( 18 Apr 2024 05:35 )             39.1      PHYSICAL EXAM  Vital Signs Last 24 Hrs  T(C): 36.8 (18 Apr 2024 07:43), Max: 37.1 (17 Apr 2024 19:48)  T(F): 98.3 (18 Apr 2024 07:43), Max: 98.7 (17 Apr 2024 19:48)  HR: 69 (18 Apr 2024 07:43) (65 - 74)  BP: 124/68 (18 Apr 2024 07:43) (109/69 - 124/68)  RR: 16 (18 Apr 2024 07:43) (16 - 18)  SpO2: 96% (18 Apr 2024 07:43) (67% - 97%)      Gen - NAD, Comfortable  HEENT - NCAT, EOMI, MMM  Neck - Supple, No limited ROM  Pulm - CTAB, No wheeze, No rhonchi, No crackles  Cardiovascular - RRR, S1S2, + murmurs  Abdomen - Soft, NT/ND, +BS  Extremities - No clubbing, no cyanosis, no peripheral edema, no calf tenderness  Neuro-     Cognitive - Awake, Alert, Oriented  to self, place, date, year, situation. Able  to follow command     Communication - Fluent, Comprehensible     Attention: Intact      Memory: Recall 3 objects immediate and 3 min later         Cranial Nerves - CN 2-12 intact     Motor -                    LEFT    UE - 4/5                    RIGHT UE - 4/5                    LEFT    LE - 3/5                    RIGHT LE - 3/5        Sensory - Intact to LT     Reflexes - DTR Intact, No primitive reflexive     Coordination - FTN intact     Tone - normal  Psychiatric - Mood stable, Affect WNL  Skin:  all skin intact I independently performed the documented the history, exam, and medical decision making. I have made amendments to the documentation where necessary. I have personally seen and examined the patient. Medical records were reviewed and I have made amendments to the documentation where necessary and adjusted the history, physical examination, and plan as documented by the Nurse Practitioner. Patient was seen and evaluated at bedside today. Reported no overnight events and is in no acute distress. Eager to participate on the recommended rehabilitation program. Denies any CP, SOB, GONZALES, palpitations, fever, chills, body aches, cough, congestion, or any other symptoms at this time. Admission vitals, labs, and physical exam are outlined below.    LAB                        13.2   8.20  )-----------( 282      ( 18 Apr 2024 05:35 )             39.1      PHYSICAL EXAM  Vital Signs Last 24 Hrs  T(C): 36.8 (18 Apr 2024 07:43), Max: 37.1 (17 Apr 2024 19:48)  T(F): 98.3 (18 Apr 2024 07:43), Max: 98.7 (17 Apr 2024 19:48)  HR: 69 (18 Apr 2024 07:43) (65 - 74)  BP: 124/68 (18 Apr 2024 07:43) (109/69 - 124/68)  RR: 16 (18 Apr 2024 07:43) (16 - 18)  SpO2: 96% (18 Apr 2024 07:43) (67% - 97%)      Gen - NAD, Comfortable  HEENT - NCAT, EOMI, MMM  Neck - Supple, No limited ROM  Pulm - CTAB, No wheeze, No rhonchi, No crackles  Cardiovascular - RRR, S1S2, + murmurs  Abdomen - Soft, NT/ND, +BS  Extremities - No clubbing, no cyanosis, no peripheral edema, no calf tenderness  Neuro-     Cognitive - Awake, Alert, Oriented  to self, place, date, year, situation. Able  to follow command     Communication - Fluent, Comprehensible     Attention: Intact      Memory: Recall 3 objects immediate and 3 min later         Cranial Nerves - CN 2-12 intact     Motor - (limited by pain)                    LEFT    UE - 4/5                    RIGHT UE - 4/5                    LEFT    LE - 3/5                    RIGHT LE - 3/5        Sensory - Intact to LT     Reflexes - DTR Intact, No primitive reflexive     Coordination - FTN intact     Tone - normal  Psychiatric - Mood stable, Affect WNL  Skin:  all skin intact

## 2024-04-17 NOTE — H&P ADULT - ASSESSMENT
ASSESSMENT/PLAN  This is a 89 YO female with PMH of HTN, Dyslipidemia, Hypothyroid, cervical fusion, parotid mass, pituitary adenoma s/p resection ( pathology benign)  who presented to [facility] on 4/14 with back pain x 1 week that has been progressive. CT lumbar spine with new mild T12 compression fracture without bony retropulsion. Seen by ortho who recommended conservative management with TLSO brace and calcium/vit D supplementation. Patient now with gait Instability, ADL impairments and Functional impairments.    #Compression Fracture  - T12 compression fracture without bony retropulsion, no surgical intervention.  - Fracture likely 2/2 osteoporosis  - Start Comprehensive Rehab Program: PT/OT, 3hours daily and 5 days weekly  - PT: Focused on improving strength, endurance, coordination, balance, functional mobility, and transfers  - OT: Focused on improving strength, fine motor skills, coordination, posture and ADLs.    - TLSO with brace    #Osteoporosis  -  calcium  BID  - Vit D3      #HTN  - Lisinopril 10mg daily    #HLD  - Lipitor 20mg daily    #Hypothyroidism  - Synthroid 50mcg daily    #Pain management  - Tylenol PRN    #DVT ppx  - Lovenox, SCD, TEDs    #Bowel Regimen  - Senna, miralax daily    #Bladder management  - BS on admission, and q 8 hours (SC if > 400)  - Monitor UO    #FEN   - Diet: DASH/TLC    #Skin:  - Skin on admission: ***    #Precaution  - Fall, Aspiration, Spinal    #GOC  CODE STATUS: FULL CODE     Outpatient Follow-up (Specialty/Name of physician):    Tuyet Montgomery  Family Medicine  [address] [place] [id number]  Phone: [phone]  Fax: [phone]  Follow Up Time: 1 week    John Milton  Orthopaedic Surgery  [address] [facility] [address] [facility] [address], [address]  [place] [address]  Phone: [phone]  Fax: [phone]  Follow Up Time: 1 week    Rudy Ross  Otolaryngology  [address] [place] [address]  Phone: [phone]  Fax: [phone]  Follow Up Time:     Dat Langford  Cardiology  [address], [address]  [place] [address]  Phone: [phone]  Fax: [phone]  Follow Up Time: 2 weeks    MEDICAL PROGNOSIS: GOOD            REHAB POTENTIAL: GOOD             ESTIMATED DISPOSITION: HOME WITH HOME CARE            ELOS: 10-14 Days   EXPECTED THERAPY:     P.T. 2hr/day       O.T. 1hr/day      S.L.P. 0hr/day     P&O Unnecessary     EXP FREQUENCY: 5 days per 7 day period     PRESCREEN COMPARISON:   I have reviewed the prescreen information and I have found no relevant changes between the preadmission screening and my post admission evaluation     RATIONALE FOR INPATIENT ADMISSION - Patient demonstrates the following: (check all that apply)  [X] Medically appropriate for rehabilitation admission  [X] Has attainable rehab goals with an appropriate initial discharge plan  [X] Has rehabilitation potential (expected to make a significant improvement within a reasonable period of time)   [X] Requires close medical management by a rehab physician, rehab nursing care, Hospitalist and comprehensive interdisciplinary team (including PT, OT, & or SLP, Prosthetics and Orthotics)   ASSESSMENT/PLAN  This is a 88 YO female with PMH of HTN, Dyslipidemia, Hypothyroid, cervical fusion, parotid mass, pituitary adenoma s/p resection ( pathology benign)  who presented to [facility] on 4/14 with back pain x 1 week that has been progressive. CT lumbar spine with new mild T12 compression fracture without bony retropulsion. Seen by ortho who recommended conservative management with TLSO brace and calcium/vit D supplementation. Patient now with gait Instability, ADL impairments and Functional impairments.    #Compression Fracture  - T12 compression fracture without bony retropulsion, no surgical intervention.  - Fracture likely 2/2 osteoporosis  - Start Comprehensive Rehab Program: PT/OT, 3hours daily and 5 days weekly  - PT: Focused on improving strength, endurance, coordination, balance, functional mobility, and transfers  - OT: Focused on improving strength, fine motor skills, coordination, posture and ADLs.    - TLSO with brace    #Osteoporosis  -  calcium  BID  - Vit D3    - f/u osteo labs    #HTN  - Lisinopril 10mg daily    #HLD  - Lipitor 20mg daily    #Hypothyroidism  - Synthroid 50mcg daily    #Pain management  - Tylenol PRN    #DVT ppx  - Lovenox, SCD, TEDs    #Bowel Regimen  - Senna, miralax daily    #Bladder management  - BS on admission, and q 8 hours (SC if > 400)  - Monitor UO    #FEN   - Diet: DASH/TLC    #Skin:  - Skin on admission: Intact    #Precaution  - Fall, Aspiration, Spinal    #GOC  CODE STATUS: FULL CODE     Outpatient Follow-up (Specialty/Name of physician):    Tuyet Montgomery  Family Medicine  [address] [place] [address]  Phone: [phone]  Fax: [phone]  Follow Up Time: 1 week    John Milton  Orthopaedic Surgery  [address] [facility] [address], [address]  [place] [address]  Phone: [phone]  Fax: [phone]  Follow Up Time: 1 week    Rudy Ross  Otolaryngology  [address] [place] [address]  Phone: [phone]  Fax: [phone]  Follow Up Time:     Dat Langford  Cardiology  [address], [address]  [place] [address]  Phone: [phone]  Fax: [phone]  Follow Up Time: 2 weeks    MEDICAL PROGNOSIS: GOOD            REHAB POTENTIAL: GOOD             ESTIMATED DISPOSITION: HOME WITH HOME CARE            ELOS: 10-14 Days   EXPECTED THERAPY:     P.T. 2hr/day       O.T. 1hr/day      S.L.P. 0hr/day     P&O Unnecessary     EXP FREQUENCY: 5 days per 7 day period     PRESCREEN COMPARISON:   I have reviewed the prescreen information and I have found no relevant changes between the preadmission screening and my post admission evaluation     RATIONALE FOR INPATIENT ADMISSION - Patient demonstrates the following: (check all that apply)  [X] Medically appropriate for rehabilitation admission  [X] Has attainable rehab goals with an appropriate initial discharge plan  [X] Has rehabilitation potential (expected to make a significant improvement within a reasonable period of time)   [X] Requires close medical management by a rehab physician, rehab nursing care, Hospitalist and comprehensive interdisciplinary team (including PT, OT, & or SLP, Prosthetics and Orthotics)   ASSESSMENT/PLAN  Mrs. Nita Diaz is an 89-year-old female patient with PMH of HTN, Dyslipidemia, Hypothyroidism, and cervical fusion who presented to [facility] on 4/14 with back pain of one week from onset. The pain was progressive and located in the lower back, which occurred as she was moving cases of water at some time in preceding week, but doesn't remember a definitive sudden sensation of injury. She was seen by home service and given Tramadol at home once per day, which was helping, but still had difficulty completing ADLs because of the pain. Recent history additionally remarkable for a fall in October in which she landed on her left side. She denied any new urinary or bowel retention or incontinence. and has chronic constipation and urinary incontinence with no new worsening. Has never had a DEXA and has no previous history of fractures.    In the ED, VSS. CT lumbar spine with New mild T12 compression fracture without bony retropulsion since prior CT abdomen and pelvis 10/6/2023, correlate for point tenderness. Mild to moderate spinal canal stenosis or neural foraminal narrowing L2-3 through L4-5. Seen by orthopedics who recommended conservative management with TLSO brace and calcium/vit D supplementation.     Patient was evaluated by PM&R and therapy for functional deficits, gait/ADL impairments and acute rehabilitation was recommended. Patient was medically optimized for discharge to [facility] on 4/17/24.      #Traumatic thoracolumbar spine injuries and thoracic spine compression fracture  - S/P Falls  - T12 compression fracture without bony retropulsion      * TLSO when OOB  - Mild to moderate lumbar spinal canal stenosis, neural foraminal narrowing L2-3 through L4-5  - Impaired ADLs and mobility  - Need for assistance with personal care  - Start Comprehensive Rehab Program: PT/OT, 3hours daily and 5 days weekly      * PT: Focused on improving strength, endurance, coordination, balance, functional mobility, and transfers      * OT: Focused on improving strength, fine motor skills, coordination, posture and ADLs.        #Osteoporosis  - calcium  BID  - Vit D3    - f/u osteo labs    #HTN  - Lisinopril 10mg daily    #HLD  - Lipitor 20mg daily    #Hypothyroidism  - Synthroid 50mcg daily    #Pain management  - Tylenol PRN    #DVT ppx  - Lovenox, SCD, TEDs    #Bowel Regimen  - Senna, miralax daily    #Bladder management  - BS on admission, and q 8 hours (SC if > 400)  - Monitor UO    #FEN   - Diet: DASH/TLC    #Skin:  - Skin on admission: Intact    #Precaution  - Fall, Aspiration, Spinal    #GOC  CODE STATUS: FULL CODE     Outpatient Follow-up (Specialty/Name of physician):    Tuyet Montgomery  Family Medicine  [address] [place] [address]  Phone: [phone]  Fax: [phone]  Follow Up Time: 1 week    John Milton  Orthopaedic Surgery  [address]  [place] [address]  Phone: [phone]  Fax: [phone]  Follow Up Time: 1 week    Rudy Ross  Otolaryngology  [address] [place] [address]  Phone: [phone]  Fax: [phone]  Follow Up Time:     Dat Langford  Cardiology  [address] [facility] [facility], [address]  [place] [address]  Phone: [phone]  Fax: [phone]  Follow Up Time: 2 weeks    MEDICAL PROGNOSIS: GOOD            REHAB POTENTIAL: GOOD             ESTIMATED DISPOSITION: HOME WITH HOME CARE            ELOS: 10-14 Days   EXPECTED THERAPY:     P.T. 2hr/day       O.T. 1hr/day      S.L.P. 0hr/day     P&O Unnecessary     EXP FREQUENCY: 5 days per 7 day period     PRESCREEN COMPARISON:   I have reviewed the prescreen information and I have found no relevant changes between the preadmission screening and my post admission evaluation     RATIONALE FOR INPATIENT ADMISSION - Patient demonstrates the following: (check all that apply)  [X] Medically appropriate for rehabilitation admission  [X] Has attainable rehab goals with an appropriate initial discharge plan  [X] Has rehabilitation potential (expected to make a significant improvement within a reasonable period of time)   [X] Requires close medical management by a rehab physician, rehab nursing care, Hospitalist and comprehensive interdisciplinary team (including PT, OT, & or SLP, Prosthetics and Orthotics)

## 2024-04-17 NOTE — PROGRESS NOTE ADULT - ASSESSMENT
89yo female with medical h/o HTN, HLD, Hypothyroid presenting with back pain, admitted for T12 compression pathological fracture    #Intractable back pain due to T12 compression fracture causing ambulatory dysfunction  #newly diagnosed osteoporosis  CT scan findings as above  Vit D level WNL  -ortho consult apprec  -PT, OT, PMR consult apprec-> to Acute Rehab  -pain control with IV tylenol/lidocaine patch, may consider adding topical NSAID  -TLSO brace  -Calcium and vitamin D supplement      #mild hyperbilirubinemia   IMPROVED    #HTN  -c/w acei  -Monitor vital signs     #HLD  -c/w home atorvastatin    #hypothyroid  -c/w synthroid    #DVT PPx  -Lovenox    #FULL CODE  -palliative care consult  apprec    Trip Diaz [phone]    AM labs  Diet DASH  Fall risk  OOB with assistance    Case d/w Dr. Wayne

## 2024-04-18 ENCOUNTER — NON-APPOINTMENT (OUTPATIENT)
Age: 89
End: 2024-04-18

## 2024-04-18 LAB
24R-OH-CALCIDIOL SERPL-MCNC: 30 NG/ML — SIGNIFICANT CHANGE UP (ref 30–80)
ALBUMIN SERPL ELPH-MCNC: 2.8 G/DL — LOW (ref 3.3–5)
ALP SERPL-CCNC: 140 U/L — HIGH (ref 40–120)
ALT FLD-CCNC: 90 U/L — HIGH (ref 10–45)
ANION GAP SERPL CALC-SCNC: 8 MMOL/L — SIGNIFICANT CHANGE UP (ref 5–17)
AST SERPL-CCNC: 97 U/L — HIGH (ref 10–40)
BASOPHILS # BLD AUTO: 0.06 K/UL — SIGNIFICANT CHANGE UP (ref 0–0.2)
BASOPHILS NFR BLD AUTO: 0.7 % — SIGNIFICANT CHANGE UP (ref 0–2)
BILIRUB SERPL-MCNC: 0.7 MG/DL — SIGNIFICANT CHANGE UP (ref 0.2–1.2)
BUN SERPL-MCNC: 30 MG/DL — HIGH (ref 7–23)
CALCIUM SERPL-MCNC: 9.4 MG/DL — SIGNIFICANT CHANGE UP (ref 8.4–10.5)
CALCIUM SERPL-MCNC: 9.5 MG/DL — SIGNIFICANT CHANGE UP (ref 8.4–10.5)
CHLORIDE SERPL-SCNC: 104 MMOL/L — SIGNIFICANT CHANGE UP (ref 96–108)
CO2 SERPL-SCNC: 27 MMOL/L — SIGNIFICANT CHANGE UP (ref 22–31)
CREAT SERPL-MCNC: 0.72 MG/DL — SIGNIFICANT CHANGE UP (ref 0.5–1.3)
EGFR: 80 ML/MIN/1.73M2 — SIGNIFICANT CHANGE UP
EOSINOPHIL # BLD AUTO: 0.18 K/UL — SIGNIFICANT CHANGE UP (ref 0–0.5)
EOSINOPHIL NFR BLD AUTO: 2.2 % — SIGNIFICANT CHANGE UP (ref 0–6)
GLUCOSE SERPL-MCNC: 86 MG/DL — SIGNIFICANT CHANGE UP (ref 70–99)
HCT VFR BLD CALC: 39.1 % — SIGNIFICANT CHANGE UP (ref 34.5–45)
HGB BLD-MCNC: 13.2 G/DL — SIGNIFICANT CHANGE UP (ref 11.5–15.5)
IMM GRANULOCYTES NFR BLD AUTO: 0.5 % — SIGNIFICANT CHANGE UP (ref 0–0.9)
LYMPHOCYTES # BLD AUTO: 1.66 K/UL — SIGNIFICANT CHANGE UP (ref 1–3.3)
LYMPHOCYTES # BLD AUTO: 20.2 % — SIGNIFICANT CHANGE UP (ref 13–44)
MCHC RBC-ENTMCNC: 31.5 PG — SIGNIFICANT CHANGE UP (ref 27–34)
MCHC RBC-ENTMCNC: 33.8 GM/DL — SIGNIFICANT CHANGE UP (ref 32–36)
MCV RBC AUTO: 93.3 FL — SIGNIFICANT CHANGE UP (ref 80–100)
MONOCYTES # BLD AUTO: 0.8 K/UL — SIGNIFICANT CHANGE UP (ref 0–0.9)
MONOCYTES NFR BLD AUTO: 9.8 % — SIGNIFICANT CHANGE UP (ref 2–14)
NEUTROPHILS # BLD AUTO: 5.46 K/UL — SIGNIFICANT CHANGE UP (ref 1.8–7.4)
NEUTROPHILS NFR BLD AUTO: 66.6 % — SIGNIFICANT CHANGE UP (ref 43–77)
NRBC # BLD: 0 /100 WBCS — SIGNIFICANT CHANGE UP (ref 0–0)
PLATELET # BLD AUTO: 282 K/UL — SIGNIFICANT CHANGE UP (ref 150–400)
POTASSIUM SERPL-MCNC: 4.4 MMOL/L — SIGNIFICANT CHANGE UP (ref 3.5–5.3)
POTASSIUM SERPL-SCNC: 4.4 MMOL/L — SIGNIFICANT CHANGE UP (ref 3.5–5.3)
PROT SERPL-MCNC: 6.6 G/DL — SIGNIFICANT CHANGE UP (ref 6–8.3)
PTH-INTACT FLD-MCNC: 26 PG/ML — SIGNIFICANT CHANGE UP (ref 15–65)
RAPID RVP RESULT: SIGNIFICANT CHANGE UP
RBC # BLD: 4.19 M/UL — SIGNIFICANT CHANGE UP (ref 3.8–5.2)
RBC # FLD: 12.7 % — SIGNIFICANT CHANGE UP (ref 10.3–14.5)
SARS-COV-2 RNA SPEC QL NAA+PROBE: SIGNIFICANT CHANGE UP
SODIUM SERPL-SCNC: 139 MMOL/L — SIGNIFICANT CHANGE UP (ref 135–145)
T4 FREE+ TSH PNL SERPL: 2.03 UIU/ML — SIGNIFICANT CHANGE UP (ref 0.27–4.2)
VIT D25+D1,25 OH+D1,25 PNL SERPL-MCNC: 19.4 PG/ML — LOW (ref 19.9–79.3)
WBC # BLD: 8.2 K/UL — SIGNIFICANT CHANGE UP (ref 3.8–10.5)
WBC # FLD AUTO: 8.2 K/UL — SIGNIFICANT CHANGE UP (ref 3.8–10.5)

## 2024-04-18 PROCEDURE — 97116 GAIT TRAINING THERAPY: CPT

## 2024-04-18 PROCEDURE — 97162 PT EVAL MOD COMPLEX 30 MIN: CPT

## 2024-04-18 PROCEDURE — 82248 BILIRUBIN DIRECT: CPT

## 2024-04-18 PROCEDURE — 99223 1ST HOSP IP/OBS HIGH 75: CPT

## 2024-04-18 PROCEDURE — 76700 US EXAM ABDOM COMPLETE: CPT | Mod: 26

## 2024-04-18 PROCEDURE — 36415 COLL VENOUS BLD VENIPUNCTURE: CPT

## 2024-04-18 PROCEDURE — 99285 EMERGENCY DEPT VISIT HI MDM: CPT

## 2024-04-18 PROCEDURE — 99233 SBSQ HOSP IP/OBS HIGH 50: CPT

## 2024-04-18 PROCEDURE — 93005 ELECTROCARDIOGRAM TRACING: CPT

## 2024-04-18 PROCEDURE — 83036 HEMOGLOBIN GLYCOSYLATED A1C: CPT

## 2024-04-18 PROCEDURE — 97535 SELF CARE MNGMENT TRAINING: CPT

## 2024-04-18 PROCEDURE — 85027 COMPLETE CBC AUTOMATED: CPT

## 2024-04-18 PROCEDURE — 82306 VITAMIN D 25 HYDROXY: CPT

## 2024-04-18 PROCEDURE — 72131 CT LUMBAR SPINE W/O DYE: CPT | Mod: MC

## 2024-04-18 PROCEDURE — 80053 COMPREHEN METABOLIC PANEL: CPT

## 2024-04-18 PROCEDURE — 97165 OT EVAL LOW COMPLEX 30 MIN: CPT

## 2024-04-18 RX ORDER — ACETAMINOPHEN 500 MG
650 TABLET ORAL EVERY 12 HOURS
Refills: 0 | Status: DISCONTINUED | OUTPATIENT
Start: 2024-04-18 | End: 2024-05-04

## 2024-04-18 RX ADMIN — ENOXAPARIN SODIUM 40 MILLIGRAM(S): 100 INJECTION SUBCUTANEOUS at 17:17

## 2024-04-18 RX ADMIN — ATORVASTATIN CALCIUM 20 MILLIGRAM(S): 80 TABLET, FILM COATED ORAL at 21:40

## 2024-04-18 RX ADMIN — SENNA PLUS 2 TABLET(S): 8.6 TABLET ORAL at 21:40

## 2024-04-18 RX ADMIN — Medication 650 MILLIGRAM(S): at 21:40

## 2024-04-18 RX ADMIN — Medication 650 MILLIGRAM(S): at 22:51

## 2024-04-18 RX ADMIN — LISINOPRIL 10 MILLIGRAM(S): 2.5 TABLET ORAL at 05:14

## 2024-04-18 RX ADMIN — Medication 800 UNIT(S): at 11:33

## 2024-04-18 RX ADMIN — Medication 10 MILLIGRAM(S): at 18:40

## 2024-04-18 RX ADMIN — Medication 650 MILLIGRAM(S): at 08:46

## 2024-04-18 RX ADMIN — Medication 650 MILLIGRAM(S): at 09:35

## 2024-04-18 RX ADMIN — POLYETHYLENE GLYCOL 3350 17 GRAM(S): 17 POWDER, FOR SOLUTION ORAL at 11:33

## 2024-04-18 RX ADMIN — Medication 1 TABLET(S): at 17:17

## 2024-04-18 RX ADMIN — Medication 50 MICROGRAM(S): at 05:14

## 2024-04-18 NOTE — DIETITIAN INITIAL EVALUATION ADULT - NS FNS DIET ORDER
DASH-TLC Diet w/ Thin Liquids (IDDSI Level 0)   Education Provided on Need for Increased Fluids/Fiber & DASH-TLC Diet   Recommend Initiate Ensure Plus High Protein 8oz PO Daily (Provides 350kcal & 20grams of Protein) - Per Patient Request

## 2024-04-18 NOTE — DIETITIAN INITIAL EVALUATION ADULT - PERTINENT LABORATORY DATA
04-18    139  |  104  |  30<H>  ----------------------------<  86  4.4   |  27  |  0.72    Ca    9.4      18 Apr 2024 05:35    TPro  6.6  /  Alb  2.8<L>  /  TBili  0.7  /  DBili  x   /  AST  97<H>  /  ALT  90<H>  /  AlkPhos  140<H>  04-18  A1C with Estimated Average Glucose Result: 5.2 % (04-15-24 @ 14:04)

## 2024-04-18 NOTE — DIETITIAN INITIAL EVALUATION ADULT - PERTINENT MEDS FT
MEDICATIONS  (STANDING):  atorvastatin 20 milliGRAM(s) Oral at bedtime  calcium carbonate   1250 mG (OsCal) 1 Tablet(s) Oral two times a day  cholecalciferol 800 Unit(s) Oral daily  enoxaparin Injectable 40 milliGRAM(s) SubCutaneous every 24 hours  levothyroxine 50 MICROGram(s) Oral daily  lisinopril 10 milliGRAM(s) Oral daily  polyethylene glycol 3350 17 Gram(s) Oral daily  senna 2 Tablet(s) Oral at bedtime    MEDICATIONS  (PRN):  acetaminophen     Tablet .. 650 milliGRAM(s) Oral every 12 hours PRN Mild Pain (1 - 3)  bisacodyl Suppository 10 milliGRAM(s) Rectal daily PRN Constipation

## 2024-04-18 NOTE — DIETITIAN INITIAL EVALUATION ADULT - ORAL INTAKE PTA/DIET HISTORY
Patient Does Not Follow Diet @Home  Consumes 2-3Meals a Day   Usually Cooks For Self Simple/Easy to Make/Microwavable Meals  Does Take Vitamin/Supplements @Home (Multivitamin & Boost)

## 2024-04-18 NOTE — CONSULT NOTE ADULT - SUBJECTIVE AND OBJECTIVE BOX
Patient is a 89y old  Female who presents with a chief complaint of Thoracic spine compression fracture (17 Apr 2024 13:16)      HPI:  HPI:  This is a 88 YO female with PMH of HTN, Dyslipidemia, Hypothyroid, cervical fusion who presented to [facility] on 4/14 with back pain x1 week. The pain is progressive located in the lower back, which has been Was moving cases of water some time last week, but doesn't remember a definitive injury. Seen by home service and given Tramadol at home once per day, which was helping, but still had difficulty completing ADLs because of the pain. Last fall was in October, when she landed on her left side. Denies new urinary or bowel retention or incontinence. chronic constipation and urinary incontinence with no new worsening. Has never had a DEXA. No previous history of fracture.    In the ED, VSS. CT lumbar spine with New mild T12 compression fracture without bony retropulsion since prior CT abdomen and pelvis 10/6/2023, correlate for point tenderness. Mild to moderate spinal canal stenosis or neural foraminal narrowing L2-3 through L4-5. Seen by orthopedics who recommended conservative management with TLSO brace and calcium/vit D supplementation.     Patient was evaluated by PM&R and therapy for functional deficits, gait/ADL impairments and acute rehabilitation was recommended. Patient was medically optimized for discharge to [facility]U on 4/17/24. (17 Apr 2024 13:16)      PAST MEDICAL & SURGICAL HISTORY:  Hypertension      Dyslipidemia      Parotid mass  left. s/p biopsy ("inconclusive) instructed to have repeat scan done in March 2015.being monitored by Dr. Sung Oscar (ENT)      Pituitary macroadenoma      Acromegaly      Calculus of gallbladder without cholecystitis without obstruction      Murmur  cardiac      History of cervical discectomy  and fusion in 2007      Neoplasm of pituitary gland      H/O thumb surgery  left          Father: - at age - with history of   Mother: - at age - with history of           Substance Use (street drugs): (  ) never used  (  ) other:  Tobacco Usage:  (   ) never smoked   (   ) former smoker   (   ) current smoker  (     ) pack year  Alcohol Usage:  Sexual History:   Recent Travel:      Allergies    No Known Allergies    Intolerances            REVIEW OF SYSTEMS:  CONSTITUTIONAL: No fever, weight loss, or fatigue  EYES: No eye pain, visual disturbances, or discharge  ENMT:  No difficulty hearing, tinnitus, vertigo; No sinus or throat pain  NECK: No pain or stiffness  BREASTS: No pain, masses, or nipple discharge  RESPIRATORY: No cough, wheezing, chills or hemoptysis; No shortness of breath  CARDIOVASCULAR: No chest pain, palpitations, dizziness, or leg swelling  GASTROINTESTINAL: No abdominal or epigastric pain. No nausea, vomiting, or hematemesis; No diarrhea or constipation. No melena or hematochezia.  GENITOURINARY: No dysuria, frequency, hematuria, or incontinence  NEUROLOGICAL: No headaches, memory loss, loss of strength, numbness, or tremors  SKIN: No itching, burning, rashes, or lesions   LYMPH NODES: No enlarged glands  ENDOCRINE: No heat or cold intolerance; No hair loss  MUSCULOSKELETAL: No joint pain or swelling; No muscle, back, or extremity pain  PSYCHIATRIC: No depression, anxiety, mood swings, or difficulty sleeping  HEME/LYMPH: No easy bruising, or bleeding gums  ALLERY AND IMMUNOLOGIC: No hives or eczema    ALL ROS REVIEWED AND NORMAL EXCEPT AS STATED ABOVE    T(C): 37.1 (04-17-24 @ 19:48), Max: 37.1 (04-17-24 @ 19:48)  HR: 65 (04-18-24 @ 05:12) (65 - 74)  BP: 109/69 (04-18-24 @ 05:12) (109/69 - 123/79)  RR: 16 (04-17-24 @ 19:48) (16 - 18)  SpO2: 96% (04-17-24 @ 19:48) (67% - 97%)  Wt(kg): --Vital Signs Last 24 Hrs  T(C): 37.1 (17 Apr 2024 19:48), Max: 37.1 (17 Apr 2024 19:48)  T(F): 98.7 (17 Apr 2024 19:48), Max: 98.7 (17 Apr 2024 19:48)  HR: 65 (18 Apr 2024 05:12) (65 - 74)  BP: 109/69 (18 Apr 2024 05:12) (109/69 - 123/79)  BP(mean): --  RR: 16 (17 Apr 2024 19:48) (16 - 18)  SpO2: 96% (17 Apr 2024 19:48) (67% - 97%)    Parameters below as of 17 Apr 2024 19:48  Patient On (Oxygen Delivery Method): room air        PHYSICAL EXAM:  GENERAL: NAD, well-groomed, well-developed  HEAD:  Atraumatic, Normocephalic  EYES: EOMI, PERRLA, conjunctiva and sclera clear  ENMT: No tonsillar erythema, exudates, or enlargement; Moist mucous membranes, Good dentition, No lesions  NECK: Supple, No JVD, Normal thyroid  NERVOUS SYSTEM:  Alert & Oriented X3, Good concentration; Motor Strength 5/5 B/L upper and lower extremities; DTRs 2+ intact and symmetric  CHEST/LUNG: Clear to percussion bilaterally; No rales, rhonchi, wheezing, or rubs  HEART: Regular rate and rhythm; No murmurs, rubs, or gallops  ABDOMEN: Soft, Nontender, Nondistended; Bowel sounds present  EXTREMITIES:  2+ Peripheral Pulses, No clubbing, cyanosis, or edema  LYMPH: No lymphadenopathy noted  SKIN: No rashes or lesions    LABS:               CAPILLARY BLOOD GLUCOSE                RADIOLOGY & ADDITIONAL TESTS:    Consultant(s) Notes Reviewed:  [x ] YES  [ ] NO  Care Discussed with Consultants/Other Providers [ x] YES  [ ] NO  Imaging Personally Reviewed:  [ ] YES  [ ] NO 88 YO female with PMH of HTN, Dyslipidemia, Hypothyroid, cervical fusion who presented to [facility] on 4/14 with back pain; CT lumbar spine with New mild T12 compression fracture without bony retropulsion since prior CT abdomen and pelvis 10/6/2023 Mild to moderate spinal canal stenosis or neural foraminal narrowing L2-3 through L4-5. Seen by orthopedics who recommended conservative management with TLSO brace and calcium/vit D supplementation.       PAST MEDICAL & SURGICAL HISTORY:  Hypertension  Dyslipidemia  Parotid mass  left. s/p biopsy ("inconclusive) instructed to have repeat scan done in March 2015.being monitored by Dr. Sung Oscar (ENT)  Pituitary macroadenoma  Acromegaly  Calculus of gallbladder without cholecystitis without obstruction  Murmur  cardiac  History of cervical discectomy  and fusion in 2007  Neoplasm of pituitary gland  H/O thumb surgery  left    FAMILY HISTORY  Father: - at age - with history of   Mother: - at age - with history of     SOCIAL HISTORY  Substance Use (street drugs): (  ) never used  (  ) other:  Tobacco Usage:  (   ) never smoked   (   ) former smoker   (   ) current smoker  (     ) pack year  Alcohol Usage:  Sexual History:   Recent Travel:    Allergies  No Known Allergies  Intolerances    REVIEW OF SYSTEMS:  CONSTITUTIONAL: No fever, weight loss, or fatigue  EYES: No eye pain, visual disturbances, or discharge  ENMT:  No difficulty hearing, tinnitus, vertigo; No sinus or throat pain  NECK: No pain or stiffness  BREASTS: No pain, masses, or nipple discharge  RESPIRATORY: No cough, wheezing, chills or hemoptysis; No shortness of breath  CARDIOVASCULAR: No chest pain, palpitations, dizziness, or leg swelling  GASTROINTESTINAL: No abdominal or epigastric pain. No nausea, vomiting, or hematemesis; No diarrhea or constipation. No melena or hematochezia.  GENITOURINARY: No dysuria, frequency, hematuria, or incontinence  NEUROLOGICAL: No headaches, memory loss, loss of strength, numbness, or tremors  SKIN: No itching, burning, rashes, or lesions   LYMPH NODES: No enlarged glands  ENDOCRINE: No heat or cold intolerance; No hair loss  MUSCULOSKELETAL: No joint pain or swelling; No muscle, back, or extremity pain  PSYCHIATRIC: No depression, anxiety, mood swings, or difficulty sleeping  HEME/LYMPH: No easy bruising, or bleeding gums  ALLERY AND IMMUNOLOGIC: No hives or eczema    ALL ROS REVIEWED AND NORMAL EXCEPT AS STATED ABOVE    T(C): 37.1 (04-17-24 @ 19:48), Max: 37.1 (04-17-24 @ 19:48)  HR: 65 (04-18-24 @ 05:12) (65 - 74)  BP: 109/69 (04-18-24 @ 05:12) (109/69 - 123/79)  RR: 16 (04-17-24 @ 19:48) (16 - 18)  SpO2: 96% (04-17-24 @ 19:48) (67% - 97%)  Wt(kg): --Vital Signs Last 24 Hrs  T(C): 37.1 (17 Apr 2024 19:48), Max: 37.1 (17 Apr 2024 19:48)  T(F): 98.7 (17 Apr 2024 19:48), Max: 98.7 (17 Apr 2024 19:48)  HR: 65 (18 Apr 2024 05:12) (65 - 74)  BP: 109/69 (18 Apr 2024 05:12) (109/69 - 123/79)  BP(mean): --  RR: 16 (17 Apr 2024 19:48) (16 - 18)  SpO2: 96% (17 Apr 2024 19:48) (67% - 97%)    Parameters below as of 17 Apr 2024 19:48  Patient On (Oxygen Delivery Method): room air    PHYSICAL EXAM:  GENERAL: NAD, well-groomed, well-developed  HEAD:  Atraumatic, Normocephalic  EYES: EOMI, PERRLA, conjunctiva and sclera clear  ENMT: No tonsillar erythema, exudates, or enlargement; Moist mucous membranes, Good dentition, No lesions  NECK: Supple, No JVD, Normal thyroid  NERVOUS SYSTEM:  Alert & Oriented X3, Good concentration; Motor Strength 5/5 B/L upper and lower extremities; DTRs 2+ intact and symmetric  CHEST/LUNG: Clear to percussion bilaterally; No rales, rhonchi, wheezing, or rubs  HEART: Regular rate and rhythm; No murmurs, rubs, or gallops  ABDOMEN: Soft, Nontender, Nondistended; Bowel sounds present  EXTREMITIES:  2+ Peripheral Pulses, No clubbing, cyanosis, or edema  LYMPH: No lymphadenopathy noted  SKIN: No rashes or lesions    LABS:    CAPILLARY BLOOD GLUCOSE      RADIOLOGY & ADDITIONAL TESTS:  CT Lumbar Spine No Cont (04.14.24 @ 14:14) >    IMPRESSION:    New mild T12 compression fracture without bony retropulsion since prior   CT abdomen and pelvis 10/6/2023, correlate for point tenderness.    Mild to moderate spinal canal stenosis or neural foraminal narrowing L2-3   through L4-5.    Consultant(s) Notes Reviewed:  [x ] YES  [ ] NO  Care Discussed with Consultants/Other Providers [ x] YES  [ ] NO  Imaging Personally Reviewed:  [ ] YES  [x ] NO 89 YO female with PMH of HTN, Dyslipidemia, Hypothyroid, cervical fusion who presented to [facility] on 4/14 with back pain; CT lumbar spine with New mild T12 compression fracture without bony retropulsion since prior CT abdomen and pelvis 10/6/2023 Mild to moderate spinal canal stenosis or neural foraminal narrowing L2-3 through L4-5. Seen by orthopedics who recommended conservative management with TLSO brace and calcium/vit D supplementation.     No events overnight  Denies chest pain, SOB  Tolerating diet      PAST MEDICAL & SURGICAL HISTORY:  Hypertension  Dyslipidemia  Parotid mass  left. s/p biopsy ("inconclusive) instructed to have repeat scan done in March 2015.being monitored by Dr. Sung Oscar (ENT)  Pituitary macroadenoma  Acromegaly  Calculus of gallbladder without cholecystitis without obstruction  Murmur  cardiac  History of cervical discectomy  and fusion in 2007  Neoplasm of pituitary gland  H/O thumb surgery  left    FAMILY HISTORY  Denies FH of CAD, HTN, HLD    SOCIAL HISTORY  Substance Use (street drugs): (x  ) never used  (  ) other:  Tobacco Usage:  ( x  ) never smoked   (   ) former smoker   (   ) current smoker  (     ) pack year  Alcohol Usage:Denies  Sexual History: Denies  Recent Travel:Denies    Allergies  No Known Allergies  Intolerances    REVIEW OF SYSTEMS:  CONSTITUTIONAL: No fever, weight loss, or fatigue  EYES: No eye pain, visual disturbances, or discharge  ENMT:  No difficulty hearing, tinnitus, vertigo; No sinus or throat pain  NECK: No pain or stiffness  BREASTS: No pain, masses, or nipple discharge  RESPIRATORY: No cough, wheezing, chills or hemoptysis; No shortness of breath  CARDIOVASCULAR: No chest pain, palpitations, dizziness, or leg swelling  GASTROINTESTINAL: No abdominal or epigastric pain. No nausea, vomiting, or hematemesis; No diarrhea or constipation. No melena or hematochezia.  GENITOURINARY: No dysuria, frequency, hematuria, or incontinence  NEUROLOGICAL: No headaches, memory loss, loss of strength, numbness, or tremors  SKIN: No itching, burning, rashes, or lesions   LYMPH NODES: No enlarged glands  ENDOCRINE: No heat or cold intolerance; No hair loss  MUSCULOSKELETAL: No joint pain or swelling; No muscle, back, or extremity pain  PSYCHIATRIC: No depression, anxiety, mood swings, or difficulty sleeping  HEME/LYMPH: No easy bruising, or bleeding gums  ALLERY AND IMMUNOLOGIC: No hives or eczema    ALL ROS REVIEWED AND NORMAL EXCEPT AS STATED ABOVE    T(C): 37.1 (04-17-24 @ 19:48), Max: 37.1 (04-17-24 @ 19:48)  HR: 65 (04-18-24 @ 05:12) (65 - 74)  BP: 109/69 (04-18-24 @ 05:12) (109/69 - 123/79)  RR: 16 (04-17-24 @ 19:48) (16 - 18)  SpO2: 96% (04-17-24 @ 19:48) (67% - 97%)  Wt(kg): --Vital Signs Last 24 Hrs  T(C): 37.1 (17 Apr 2024 19:48), Max: 37.1 (17 Apr 2024 19:48)  T(F): 98.7 (17 Apr 2024 19:48), Max: 98.7 (17 Apr 2024 19:48)  HR: 65 (18 Apr 2024 05:12) (65 - 74)  BP: 109/69 (18 Apr 2024 05:12) (109/69 - 123/79)  BP(mean): --  RR: 16 (17 Apr 2024 19:48) (16 - 18)  SpO2: 96% (17 Apr 2024 19:48) (67% - 97%)    Parameters below as of 17 Apr 2024 19:48  Patient On (Oxygen Delivery Method): room air    PHYSICAL EXAM:  GENERAL: NAD, well-groomed, well-developed  HEAD:  Atraumatic, Normocephalic  EYES: EOMI, PERRLA, conjunctiva and sclera clear  NECK: Supple, No JVD, Normal thyroid  NERVOUS SYSTEM:  Alert & Oriented  CHEST/LUNG: Clear to percussion bilaterally; No rales, rhonchi, wheezing, or rubs  HEART: Regular rate and rhythm; No murmurs, rubs, or gallops  ABDOMEN: Soft, Nontender, Nondistended; Bowel sounds present  EXTREMITIES:  2+ Peripheral Pulses, No clubbing, cyanosis, or edema    LABS:                          13.2   8.20  )-----------( 282      ( 18 Apr 2024 05:35 )             39.1   04-18    139  |  104  |  30<H>  ----------------------------<  86  4.4   |  27  |  0.72    Ca    9.4      18 Apr 2024 05:35    TPro  6.6  /  Alb  2.8<L>  /  TBili  0.7  /  DBili  x   /  AST  97<H>  /  ALT  90<H>  /  AlkPhos  140<H>  04-18    CAPILLARY BLOOD GLUCOSE    RADIOLOGY & ADDITIONAL TESTS:  CT Lumbar Spine No Cont (04.14.24 @ 14:14) >    IMPRESSION:    New mild T12 compression fracture without bony retropulsion since prior   CT abdomen and pelvis 10/6/2023, correlate for point tenderness.    Mild to moderate spinal canal stenosis or neural foraminal narrowing L2-3   through L4-5.    Consultant(s) Notes Reviewed:  [x ] YES  [ ] NO  Care Discussed with Consultants/Other Providers [ x] YES  [ ] NO  Imaging Personally Reviewed:  [ ] YES  [x ] NO

## 2024-04-18 NOTE — DIETITIAN INITIAL EVALUATION ADULT - FACTORS AFF FOOD INTAKE
States Good PO Intake/Appetite over Last Week  Denies Recent Change in Meal Consumption (Per Patient)/none

## 2024-04-18 NOTE — CONSULT NOTE ADULT - ASSESSMENT
89 YO female with PMH of HTN, Dyslipidemia, Hypothyroid, cervical fusion, parotid mass, pituitary adenoma s/p resection ( pathology benign)  who presented to [facility] on 4/14 with back pain x 1 week that has been progressive. CT lumbar spine with new mild T12 compression fracture without bony retropulsion. Seen by ortho who recommended conservative management with TLSO brace    #Compression Fracture  - T12 compression fracture without bony retropulsion, no surgical intervention.  - Fracture likely 2/2 osteoporosis  - Activity with TLSO    #Osteoporosis  -  calcium  BID  - Vit D3    - f/u osteo labs    #HTN  - Lisinopril 10mg daily    #HLD  - Lipitor 20mg daily    #Hypothyroidism  - Synthroid 50mcg daily    DVT ppx- Lovenox     89 YO female with PMH of HTN, Dyslipidemia, Hypothyroid, cervical fusion, parotid mass, pituitary adenoma s/p resection ( pathology benign)  who presented to [facility] on 4/14 with back pain x 1 week that has been progressive. CT lumbar spine with new mild T12 compression fracture without bony retropulsion. Seen by ortho who recommended conservative management with TLSO brace    #Compression Fracture  - T12 compression fracture without bony retropulsion, no surgical intervention.  - Fracture likely 2/2 osteoporosis  - Activity with TLSO    #Transaminitis  - Noted acute elevation in LFTs  - ABD ULT ordered  - Would suggest to decrease/discontinue use of tylenol  - LFTS in AM  - Will check lipid panel in AM; c/w lipitor 20mg qhs for now  - Will check hepatitis panel in AM  - Will check GGT in AM    #Osteoporosis  -  calcium  BID  - Vit D3      #HTN  - Lisinopril 10mg daily    #HLD  - Lipitor 20mg daily    #Hypothyroidism  - Synthroid 50mcg daily    DVT ppx- Lovenox

## 2024-04-18 NOTE — DIETITIAN INITIAL EVALUATION ADULT - ADD RECOMMEND
1) Monitor Weights, Intake, Tolerance, Skin & Labwork  2) Education Provided on Need for Increased Fluids/Fiber & DASH-TLC Diet   3) Ensure Plus High Protein 8oz PO Daily (Per Patient Request)   4) Continue Nutrition Plan of Care

## 2024-04-19 LAB
ALBUMIN SERPL ELPH-MCNC: 2.6 G/DL — LOW (ref 3.3–5)
ALP SERPL-CCNC: 143 U/L — HIGH (ref 40–120)
ALT FLD-CCNC: 90 U/L — HIGH (ref 10–45)
ANION GAP SERPL CALC-SCNC: 7 MMOL/L — SIGNIFICANT CHANGE UP (ref 5–17)
AST SERPL-CCNC: 68 U/L — HIGH (ref 10–40)
BILIRUB DIRECT SERPL-MCNC: 0.1 MG/DL — SIGNIFICANT CHANGE UP (ref 0–0.3)
BILIRUB INDIRECT FLD-MCNC: 0.5 MG/DL — SIGNIFICANT CHANGE UP (ref 0.2–1)
BILIRUB SERPL-MCNC: 0.6 MG/DL — SIGNIFICANT CHANGE UP (ref 0.2–1.2)
BUN SERPL-MCNC: 65 MG/DL — HIGH (ref 7–23)
CALCIUM SERPL-MCNC: 9.2 MG/DL — SIGNIFICANT CHANGE UP (ref 8.4–10.5)
CHLORIDE SERPL-SCNC: 98 MMOL/L — SIGNIFICANT CHANGE UP (ref 96–108)
CHOLEST SERPL-MCNC: 137 MG/DL — SIGNIFICANT CHANGE UP
CO2 SERPL-SCNC: 30 MMOL/L — SIGNIFICANT CHANGE UP (ref 22–31)
CREAT SERPL-MCNC: 1.64 MG/DL — HIGH (ref 0.5–1.3)
EGFR: 30 ML/MIN/1.73M2 — LOW
GGT SERPL-CCNC: 14 U/L — SIGNIFICANT CHANGE UP (ref 8–40)
GLUCOSE SERPL-MCNC: 87 MG/DL — SIGNIFICANT CHANGE UP (ref 70–99)
HAV IGM SER-ACNC: SIGNIFICANT CHANGE UP
HBV CORE IGM SER-ACNC: SIGNIFICANT CHANGE UP
HBV SURFACE AG SER-ACNC: SIGNIFICANT CHANGE UP
HCV AB S/CO SERPL IA: 0.06 S/CO — SIGNIFICANT CHANGE UP (ref 0–0.99)
HCV AB SERPL-IMP: SIGNIFICANT CHANGE UP
HDLC SERPL-MCNC: 52 MG/DL — SIGNIFICANT CHANGE UP
LIPID PNL WITH DIRECT LDL SERPL: 71 MG/DL — SIGNIFICANT CHANGE UP
NON HDL CHOLESTEROL: 85 MG/DL — SIGNIFICANT CHANGE UP
POTASSIUM SERPL-MCNC: 5 MMOL/L — SIGNIFICANT CHANGE UP (ref 3.5–5.3)
POTASSIUM SERPL-SCNC: 5 MMOL/L — SIGNIFICANT CHANGE UP (ref 3.5–5.3)
PROT SERPL-MCNC: 6.5 G/DL — SIGNIFICANT CHANGE UP (ref 6–8.3)
SODIUM SERPL-SCNC: 135 MMOL/L — SIGNIFICANT CHANGE UP (ref 135–145)
TRIGL SERPL-MCNC: 68 MG/DL — SIGNIFICANT CHANGE UP

## 2024-04-19 PROCEDURE — 99233 SBSQ HOSP IP/OBS HIGH 50: CPT | Mod: GC

## 2024-04-19 PROCEDURE — 99232 SBSQ HOSP IP/OBS MODERATE 35: CPT

## 2024-04-19 RX ORDER — SODIUM CHLORIDE 9 MG/ML
1000 INJECTION INTRAMUSCULAR; INTRAVENOUS; SUBCUTANEOUS ONCE
Refills: 0 | Status: COMPLETED | OUTPATIENT
Start: 2024-04-19 | End: 2024-04-19

## 2024-04-19 RX ORDER — LIDOCAINE 4 G/100G
1 CREAM TOPICAL
Refills: 0 | Status: DISCONTINUED | OUTPATIENT
Start: 2024-04-20 | End: 2024-05-04

## 2024-04-19 RX ORDER — LIDOCAINE 4 G/100G
1 CREAM TOPICAL
Refills: 0 | Status: DISCONTINUED | OUTPATIENT
Start: 2024-04-19 | End: 2024-05-04

## 2024-04-19 RX ORDER — SODIUM CHLORIDE 9 MG/ML
1000 INJECTION INTRAMUSCULAR; INTRAVENOUS; SUBCUTANEOUS
Refills: 0 | Status: DISCONTINUED | OUTPATIENT
Start: 2024-04-19 | End: 2024-04-20

## 2024-04-19 RX ORDER — LIDOCAINE 4 G/100G
1 CREAM TOPICAL DAILY
Refills: 0 | Status: DISCONTINUED | OUTPATIENT
Start: 2024-04-19 | End: 2024-04-19

## 2024-04-19 RX ADMIN — Medication 800 UNIT(S): at 12:15

## 2024-04-19 RX ADMIN — SODIUM CHLORIDE 1000 MILLILITER(S): 9 INJECTION INTRAMUSCULAR; INTRAVENOUS; SUBCUTANEOUS at 09:47

## 2024-04-19 RX ADMIN — SENNA PLUS 2 TABLET(S): 8.6 TABLET ORAL at 22:09

## 2024-04-19 RX ADMIN — LISINOPRIL 10 MILLIGRAM(S): 2.5 TABLET ORAL at 05:29

## 2024-04-19 RX ADMIN — LIDOCAINE 1 PATCH: 4 CREAM TOPICAL at 19:59

## 2024-04-19 RX ADMIN — Medication 1 TABLET(S): at 05:29

## 2024-04-19 RX ADMIN — Medication 50 MICROGRAM(S): at 05:29

## 2024-04-19 RX ADMIN — LIDOCAINE 1 PATCH: 4 CREAM TOPICAL at 22:12

## 2024-04-19 RX ADMIN — SODIUM CHLORIDE 75 MILLILITER(S): 9 INJECTION INTRAMUSCULAR; INTRAVENOUS; SUBCUTANEOUS at 15:04

## 2024-04-19 RX ADMIN — ATORVASTATIN CALCIUM 20 MILLIGRAM(S): 80 TABLET, FILM COATED ORAL at 22:09

## 2024-04-19 RX ADMIN — LIDOCAINE 1 PATCH: 4 CREAM TOPICAL at 10:31

## 2024-04-19 RX ADMIN — Medication 1 TABLET(S): at 17:14

## 2024-04-19 RX ADMIN — LIDOCAINE 1 PATCH: 4 CREAM TOPICAL at 19:58

## 2024-04-19 RX ADMIN — ENOXAPARIN SODIUM 40 MILLIGRAM(S): 100 INJECTION SUBCUTANEOUS at 17:14

## 2024-04-19 RX ADMIN — LIDOCAINE 1 PATCH: 4 CREAM TOPICAL at 12:20

## 2024-04-19 NOTE — PROGRESS NOTE ADULT - ATTENDING COMMENTS
Rehab Attending- Patient seen and examined by me - Case discussed, above note reviewed by me with modifications made    patient seen and examined Bedside, then seen in PT gym  Reports feeling Better- Voiding  reports rosalia Lower back pain - using lidoderm patches- Less tylenol secondary to elevated LFTs  had good BM today  Labs reviewed- BUN/Cr 65/1.6  Hospitalist held lisinopril, giving IV Fluids- Check BMP in AM  Will Check PVR  On Exam BLES with FROM with MS 4/5 hip flexors, otherwise 5/5  Brisk reflexes LES Compared to uppers - plantars downgoing  sensation intact all ext  ambulates with narrow base of support- ? long tract  to continue intensive rehab program

## 2024-04-19 NOTE — PROGRESS NOTE ADULT - SUBJECTIVE AND OBJECTIVE BOX
HPI:  Mrs. Nita Diaz is an 89-year-old female patient with PMH of HTN, Dyslipidemia, Hypothyroidism, and cervical fusion who presented to [facility] on 4/14 with back pain of one week from onset. The pain was progressive and located in the lower back, which occurred as she was moving cases of water at some time in preceding week, but doesn't remember a definitive sudden sensation of injury. She was seen by home service and given Tramadol at home once per day, which was helping, but still had difficulty completing ADLs because of the pain. Recent history additionally remarkable for a fall in October in which she landed on her left side. She denied any new urinary or bowel retention or incontinence. and has chronic constipation and urinary incontinence with no new worsening. Has never had a DEXA and has no previous history of fractures.    In the ED, VSS. CT lumbar spine with New mild T12 compression fracture without bony retropulsion since prior CT abdomen and pelvis 10/6/2023, correlate for point tenderness. Mild to moderate spinal canal stenosis or neural foraminal narrowing L2-3 through L4-5. Seen by orthopedics who recommended conservative management with TLSO brace and calcium/vit D supplementation.     Patient was evaluated by PM&R and therapy for functional deficits, gait/ADL impairments and acute rehabilitation was recommended. Patient was medically optimized for discharge to [facility]F on 4/17/24. (17 Apr 2024 13:16)      ___________________________________________________________________________    SUBJECTIVE/ROS  Patient was seen and evaluated at bedside today.  Reported no overnight events and is in no acute distress.  Eager to participate on the recommended rehabilitation program.  Denies any CP, SOB, GONZALES, palpitations, fever, chills, body aches, cough, congestion, or any other symptoms at this time.   ___________________________________________________________________________    VITALS  T(C): 37 (04-19-24 @ 07:59), Max: 37 (04-19-24 @ 07:59)  HR: 64 (04-19-24 @ 07:59) (64 - 76)  BP: 111/67 (04-19-24 @ 07:59) (100/62 - 119/75)  RR: 16 (04-19-24 @ 07:59) (16 - 17)  SpO2: 95% (04-19-24 @ 07:59) (95% - 96%)  ___________________________________________________________________________    LABS                          13.2   8.20  )-----------( 282      ( 18 Apr 2024 05:35 )             39.1       04-19    135  |  98  |  65<H>  ----------------------------<  87  5.0   |  30  |  1.64<H>    Ca    9.2      19 Apr 2024 06:11    TPro  6.5  /  Alb  2.6<L>  /  TBili  0.6  /  DBili  0.1  /  AST  68<H>  /  ALT  90<H>  /  AlkPhos  143<H>  04-19    ___________________________________________________________________________    MEDICATIONS  (STANDING):  atorvastatin 20 milliGRAM(s) Oral at bedtime  calcium carbonate   1250 mG (OsCal) 1 Tablet(s) Oral two times a day  cholecalciferol 800 Unit(s) Oral daily  enoxaparin Injectable 40 milliGRAM(s) SubCutaneous every 24 hours  levothyroxine 50 MICROGram(s) Oral daily  lidocaine   4% Patch 1 Patch Transdermal <User Schedule>  lidocaine   4% Patch 1 Patch Transdermal <User Schedule>  polyethylene glycol 3350 17 Gram(s) Oral daily  senna 2 Tablet(s) Oral at bedtime  sodium chloride 0.9%. 1000 milliLiter(s) (75 mL/Hr) IV Continuous <Continuous>    MEDICATIONS  (PRN):  acetaminophen     Tablet .. 650 milliGRAM(s) Oral every 12 hours PRN Mild Pain (1 - 3)  bisacodyl Suppository 10 milliGRAM(s) Rectal daily PRN Constipation    ___________________________________________________________________________    Gen - NAD, Comfortable  HEENT - NCAT, EOMI, MMM  Neck - Supple, No limited ROM  Pulm - CTAB, No wheeze, No rhonchi, No crackles  Cardiovascular - RRR, S1S2, + murmurs  Abdomen - Soft, NT/ND, +BS  Extremities - No clubbing, no cyanosis, no peripheral edema, no calf tenderness  Neuro-     Cognitive - Awake, Alert, Oriented  to self, place, date, year, situation. Able  to follow command     Communication - Fluent, Comprehensible     Attention: Intact      Memory: Recall 3 objects immediate and 3 min later         Cranial Nerves - CN 2-12 intact     Motor -                    LEFT    UE - 4/5                    RIGHT UE - 4/5                    LEFT    LE - 3/5                    RIGHT LE - 3/5        Sensory - Intact to LT     Reflexes - DTR Intact, No primitive reflexive     Coordination - FTN intact     Tone - normal  Psychiatric - Mood stable, Affect WNL  Skin:  all skin intact    ___________________________________________________________________________ HPI:  Mrs. Nita Diaz is an 89-year-old female patient with PMH of HTN, Dyslipidemia, Hypothyroidism, and cervical fusion who presented to [facility] on 4/14 with back pain of one week from onset. The pain was progressive and located in the lower back, which occurred as she was moving cases of water at some time in preceding week, but doesn't remember a definitive sudden sensation of injury. She was seen by home service and given Tramadol at home once per day, which was helping, but still had difficulty completing ADLs because of the pain. Recent history additionally remarkable for a fall in October in which she landed on her left side. She denied any new urinary or bowel retention or incontinence. and has chronic constipation and urinary incontinence with no new worsening. Has never had a DEXA and has no previous history of fractures.    In the ED, VSS. CT lumbar spine with New mild T12 compression fracture without bony retropulsion since prior CT abdomen and pelvis 10/6/2023, correlate for point tenderness. Mild to moderate spinal canal stenosis or neural foraminal narrowing L2-3 through L4-5. Seen by orthopedics who recommended conservative management with TLSO brace and calcium/vit D supplementation.     Patient was evaluated by PM&R and therapy for functional deficits, gait/ADL impairments and acute rehabilitation was recommended. Patient was medically optimized for discharge to [facility] IRF on 4/17/24. (17 Apr 2024 13:16)      ___________________________________________________________________________    SUBJECTIVE/ROS  Patient was seen and evaluated at bedside today.  Reported no overnight events and is in no acute distress.  This morning she endorses b/l Low back pain since her tylenol was decreased for transaminitis.   Morning labs significant for KEYANA.   Eager to participate on the recommended rehabilitation program.  Denies any CP, SOB, GONZALES, palpitations, fever, chills, body aches, cough, congestion, or any other symptoms at this time.   ___________________________________________________________________________    Vital Signs Last 24 Hrs  T(C): 37 (19 Apr 2024 07:59), Max: 37 (19 Apr 2024 07:59)  T(F): 98.6 (19 Apr 2024 07:59), Max: 98.6 (19 Apr 2024 07:59)  HR: 64 (19 Apr 2024 07:59) (64 - 76)  BP: 111/67 (19 Apr 2024 07:59) (100/62 - 119/75)  BP(mean): --  RR: 16 (19 Apr 2024 07:59) (16 - 17)  SpO2: 95% (19 Apr 2024 07:59) (95% - 96%)    Parameters below as of 19 Apr 2024 07:59  Patient On (Oxygen Delivery Method): room air      ___________________________________________________________________________    LABS    04-19    135  |  98  |  65<H>  ----------------------------<  87  5.0   |  30  |  1.64<H>    Ca    9.2      19 Apr 2024 06:11    TPro  6.5  /  Alb  2.6<L>  /  TBili  0.6  /  DBili  0.1  /  AST  68<H>  /  ALT  90<H>  /  AlkPhos  143<H>  04-19                          13.2   8.20  )-----------( 282      ( 18 Apr 2024 05:35 )             39.1         ___________________________________________________________________________    MEDICATIONS  (STANDING):  atorvastatin 20 milliGRAM(s) Oral at bedtime  calcium carbonate   1250 mG (OsCal) 1 Tablet(s) Oral two times a day  cholecalciferol 800 Unit(s) Oral daily  enoxaparin Injectable 40 milliGRAM(s) SubCutaneous every 24 hours  levothyroxine 50 MICROGram(s) Oral daily  lidocaine   4% Patch 1 Patch Transdermal <User Schedule>  lidocaine   4% Patch 1 Patch Transdermal <User Schedule>  polyethylene glycol 3350 17 Gram(s) Oral daily  senna 2 Tablet(s) Oral at bedtime  sodium chloride 0.9%. 1000 milliLiter(s) (75 mL/Hr) IV Continuous <Continuous>    MEDICATIONS  (PRN):  acetaminophen     Tablet .. 650 milliGRAM(s) Oral every 12 hours PRN Mild Pain (1 - 3)  bisacodyl Suppository 10 milliGRAM(s) Rectal daily PRN Constipation    ___________________________________________________________________________    Gen - NAD, Comfortable  HEENT - NCAT, EOMI, MMM  Neck - Supple, No limited ROM  Pulm - CTAB, No wheeze, No rhonchi, No crackles  Cardiovascular - RRR, S1S2, + murmurs  Abdomen - Soft, NT/ND, +BS  Extremities - No clubbing, no cyanosis, no peripheral edema, no calf tenderness  Neuro-     Cognitive - Awake, Alert, Oriented  to self, place, date, year, situation. Able  to follow command     Communication - Fluent, Comprehensible     Attention: Intact      Memory: Recall 3 objects immediate and 3 min later         Cranial Nerves - CN 2-12 intact     Motor -                    LEFT    UE - 4/5                    RIGHT UE - 4/5                    LEFT    LE - 3/5                    RIGHT LE - 3/5        Sensory - Intact to LT     Reflexes - DTR Intact, No primitive reflexive     Coordination - FTN intact     Tone - normal  Psychiatric - Mood stable, Affect WNL  Skin:  all skin intact    ___________________________________________________________________________ HPI:  Mrs. Nita Diaz is an 89-year-old female patient with PMH of HTN, Dyslipidemia, Hypothyroidism, and cervical fusion who presented to [facility] on 4/14 with back pain of one week from onset. The pain was progressive and located in the lower back, which occurred as she was moving cases of water at some time in preceding week, but doesn't remember a definitive sudden sensation of injury. She was seen by home service and given Tramadol at home once per day, which was helping, but still had difficulty completing ADLs because of the pain. Recent history additionally remarkable for a fall in October in which she landed on her left side. She denied any new urinary or bowel retention or incontinence. and has chronic constipation and urinary incontinence with no new worsening. Has never had a DEXA and has no previous history of fractures.    In the ED, VSS. CT lumbar spine with New mild T12 compression fracture without bony retropulsion since prior CT abdomen and pelvis 10/6/2023, correlate for point tenderness. Mild to moderate spinal canal stenosis or neural foraminal narrowing L2-3 through L4-5. Seen by orthopedics who recommended conservative management with TLSO brace and calcium/vit D supplementation.     Patient was evaluated by PM&R and therapy for functional deficits, gait/ADL impairments and acute rehabilitation was recommended. Patient was medically optimized for discharge to [facility] IRF on 4/17/24. (17 Apr 2024 13:16)      ___________________________________________________________________________    SUBJECTIVE/ROS  Patient was seen and evaluated at bedside today.  Reported no overnight events and is in no acute distress.  This morning she endorses b/l Low back pain since her tylenol was decreased for transaminitis.   Morning labs significant for KEYANA.   Eager to participate on the recommended rehabilitation program.  Denies any CP, SOB, GONZALES, palpitations, fever, chills, body aches, cough, congestion, or any other symptoms at this time.   ___________________________________________________________________________    Vital Signs Last 24 Hrs  T(C): 37 (19 Apr 2024 07:59), Max: 37 (19 Apr 2024 07:59)  T(F): 98.6 (19 Apr 2024 07:59), Max: 98.6 (19 Apr 2024 07:59)  HR: 64 (19 Apr 2024 07:59) (64 - 76)  BP: 111/67 (19 Apr 2024 07:59) (100/62 - 119/75)  BP(mean): --  RR: 16 (19 Apr 2024 07:59) (16 - 17)  SpO2: 95% (19 Apr 2024 07:59) (95% - 96%)    Parameters below as of 19 Apr 2024 07:59  Patient On (Oxygen Delivery Method): room air      ___________________________________________________________________________    LABS    04-19    135  |  98  |  65<H>  ----------------------------<  87  5.0   |  30  |  1.64<H>    Ca    9.2      19 Apr 2024 06:11    TPro  6.5  /  Alb  2.6<L>  /  TBili  0.6  /  DBili  0.1  /  AST  68<H>  /  ALT  90<H>  /  AlkPhos  143<H>  04-19                          13.2   8.20  )-----------( 282      ( 18 Apr 2024 05:35 )             39.1     Lipid Profile in AM (04.19.24 @ 06:11)   Cholesterol: 137: Interpretive Comment:   Acceptable: <200 mg/dL (for adults) ; <170 mg/dL (for children) mg/dL  Triglycerides, Serum: 68: Interpretive Comment:   Acceptable: <150 mg/dL (for adults) ; < 90 mg/dL (for children)   Triglyceride concentration can be influenced when measured in the   non-fasting state. mg/dL  HDL Cholesterol: 52:    ___________________________________________________________________________    MEDICATIONS  (STANDING):  atorvastatin 20 milliGRAM(s) Oral at bedtime  calcium carbonate   1250 mG (OsCal) 1 Tablet(s) Oral two times a day  cholecalciferol 800 Unit(s) Oral daily  enoxaparin Injectable 40 milliGRAM(s) SubCutaneous every 24 hours  levothyroxine 50 MICROGram(s) Oral daily  lidocaine   4% Patch 1 Patch Transdermal <User Schedule>  lidocaine   4% Patch 1 Patch Transdermal <User Schedule>  polyethylene glycol 3350 17 Gram(s) Oral daily  senna 2 Tablet(s) Oral at bedtime  sodium chloride 0.9%. 1000 milliLiter(s) (75 mL/Hr) IV Continuous <Continuous>    MEDICATIONS  (PRN):  acetaminophen     Tablet .. 650 milliGRAM(s) Oral every 12 hours PRN Mild Pain (1 - 3)  bisacodyl Suppository 10 milliGRAM(s) Rectal daily PRN Constipation    ___________________________________________________________________________    Gen - NAD, Comfortable  HEENT - NCAT, EOMI, MMM  Neck - Supple, No limited ROM  Pulm - CTAB, No wheeze, No rhonchi, No crackles  Cardiovascular - RRR, S1S2, + murmurs  Abdomen - Soft, NT/ND, +BS  Extremities - No clubbing, no cyanosis, no peripheral edema, no calf tenderness  Neuro-     Cognitive - Awake, Alert, Oriented  to self, place, date, year, situation. Able  to follow command     Communication - Fluent, Comprehensible     Attention: Intact      Memory: Recall 3 objects immediate and 3 min later         Cranial Nerves - CN 2-12 intact     Motor -                    LEFT    UE - 4/5                    RIGHT UE - 4/5                    LEFT    LE - 3/5                    RIGHT LE - 3/5        Sensory - Intact to LT     Reflexes - DTR Intact, No primitive reflexive     Coordination - FTN intact     Tone - normal  Psychiatric - Mood stable, Affect WNL  Skin:  all skin intact    ___________________________________________________________________________

## 2024-04-19 NOTE — PROGRESS NOTE ADULT - ASSESSMENT
89 YO female with PMH of HTN, Dyslipidemia, Hypothyroid, cervical fusion, parotid mass, pituitary adenoma s/p resection ( pathology benign)  who presented to [facility] on 4/14 with back pain x 1 week that has been progressive. CT lumbar spine with new mild T12 compression fracture without bony retropulsion. Seen by ortho who recommended conservative management with TLSO brace    #Compression Fracture  - T12 compression fracture without bony retropulsion, no surgical intervention.  - Fracture likely 2/2 osteoporosis  - Activity with TLSO    #KEYANA  - Will d/c lisinopril 10mg daily today; monitor SBP  - Will give NS bolus x 1 followed by NS @75ml/hr  - BMP in AM  - Encourage oral hydration    #Transaminitis, stable  - Noted acute elevation in LFTs  - ABD ULT WNL  - Would decrease use of tylenol if possible  - Pending results of lipid panel and hepatitis panel  - c/w lipitor 20mg qhs for now    #Osteoporosis  -  calcium  BID  - Vit D3      #HTN  - Will monitor SBP off of lisinopril 10mg daily  - Had episodes of hypotension yesterday    #HLD  - Lipitor 20mg daily    #Hypothyroidism  - Synthroid 50mcg daily    DVT ppx- Lovenox

## 2024-04-19 NOTE — PROGRESS NOTE ADULT - SUBJECTIVE AND OBJECTIVE BOX
Patient is a 89y old  Female who presents with a chief complaint of Unspecified fracture of t11-T12 vertebra, initial encounter for closed fracture    Did not eat/drink much yesterday  Noted episode of hypotension yesterday  Reports pain  Denies chest pain, SOB;      Patient seen and examined at bedside.    ALLERGIES:  No Known Allergies    MEDICATIONS  (STANDING):  atorvastatin 20 milliGRAM(s) Oral at bedtime  calcium carbonate   1250 mG (OsCal) 1 Tablet(s) Oral two times a day  cholecalciferol 800 Unit(s) Oral daily  enoxaparin Injectable 40 milliGRAM(s) SubCutaneous every 24 hours  levothyroxine 50 MICROGram(s) Oral daily  lidocaine   4% Patch 1 Patch Transdermal daily  polyethylene glycol 3350 17 Gram(s) Oral daily  senna 2 Tablet(s) Oral at bedtime  sodium chloride 0.9%. 1000 milliLiter(s) (75 mL/Hr) IV Continuous <Continuous>    MEDICATIONS  (PRN):  acetaminophen     Tablet .. 650 milliGRAM(s) Oral every 12 hours PRN Mild Pain (1 - 3)  bisacodyl Suppository 10 milliGRAM(s) Rectal daily PRN Constipation    Vital Signs Last 24 Hrs  T(F): 98.6 (19 Apr 2024 07:59), Max: 98.6 (19 Apr 2024 07:59)  HR: 64 (19 Apr 2024 07:59) (64 - 76)  BP: 111/67 (19 Apr 2024 07:59) (96/61 - 119/75)  RR: 16 (19 Apr 2024 07:59) (16 - 17)  SpO2: 95% (19 Apr 2024 07:59) (95% - 96%)  I&O's Summary    BMI (kg/m2): 22.1 (04-17-24 @ 07:14)  PHYSICAL EXAM:  General: NAD, A/O x 3, frail appearing  IV line RUE  ENT: MMM, no scleral icterus  Neck: Supple, No JVD, no thyroidomegaly  Lungs: Clear to auscultation bilaterally, no wheezes, no rales, no rhonchi, good inspiratory effort  Cardio: RRR, S1/S2, No murmurs  Abdomen: Soft, Nontender, Nondistended; Bowel sounds present  Extremities: No calf tenderness, No pitting edema, no skin changes    LABS:                        13.2   8.20  )-----------( 282      ( 18 Apr 2024 05:35 )             39.1       04-19    135  |  98  |  65  ----------------------------<  87  5.0   |  30  |  1.64    Ca    9.2      19 Apr 2024 06:11    TPro  6.5  /  Alb  2.6  /  TBili  0.6  /  DBili  0.1  /  AST  68  /  ALT  90  /  AlkPhos  143  04-19     TSH --   TSH with FT4 reflex 2.03  Total T3 --    Urinalysis Basic - ( 19 Apr 2024 06:11 )    Color: x / Appearance: x / SG: x / pH: x  Gluc: 87 mg/dL / Ketone: x  / Bili: x / Urobili: x   Blood: x / Protein: x / Nitrite: x   Leuk Esterase: x / RBC: x / WBC x   Sq Epi: x / Non Sq Epi: x / Bacteria: x

## 2024-04-19 NOTE — PROGRESS NOTE ADULT - ASSESSMENT
Mrs. Nita Diaz is an 89-year-old female patient with PMH of HTN, Dyslipidemia, Hypothyroidism, and cervical fusion who presented to [facility] on 4/14 with back pain of one week from onset. The pain was progressive and located in the lower back, which occurred as she was moving cases of water at some time in preceding week, but doesn't remember a definitive sudden sensation of injury. She was seen by home service and given Tramadol at home once per day, which was helping, but still had difficulty completing ADLs because of the pain. Recent history additionally remarkable for a fall in October in which she landed on her left side. She denied any new urinary or bowel retention or incontinence. and has chronic constipation and urinary incontinence with no new worsening. Has never had a DEXA and has no previous history of fractures.    In the ED, VSS. CT lumbar spine with New mild T12 compression fracture without bony retropulsion since prior CT abdomen and pelvis 10/6/2023, correlate for point tenderness. Mild to moderate spinal canal stenosis or neural foraminal narrowing L2-3 through L4-5. Seen by orthopedics who recommended conservative management with TLSO brace and calcium/vit D supplementation.     Patient was evaluated by PM&R and therapy for functional deficits, gait/ADL impairments and acute rehabilitation was recommended. Patient was medically optimized for discharge to [facility] IR[facility] on 4/17/24.      #Traumatic thoracolumbar spine injuries and thoracic spine compression fracture  - S/P Falls  - T12 compression fracture without bony retropulsion      * TLSO when OOB  - Mild to moderate lumbar spinal canal stenosis, neural foraminal narrowing L2-3 through L4-5  - Impaired ADLs and mobility  - Need for assistance with personal care  - Start Comprehensive Rehab Program: PT/OT, 3hours daily and 5 days weekly      * PT: Focused on improving strength, endurance, coordination, balance, functional mobility, and transfers      * OT: Focused on improving strength, fine motor skills, coordination, posture and ADLs.        #Osteoporosis  - calcium  BID  - Vit D3    - f/u osteo labs    #HTN  - Lisinopril 10mg daily    #HLD  - Lipitor 20mg daily    #Hypothyroidism  - Synthroid 50mcg daily    #Pain management  - Tylenol PRN    #DVT ppx  - Lovenox, SCD, TEDs    #Bowel Regimen  - Senna, miralax daily    #Bladder management  - BS on admission, and q 8 hours (SC if > 400)  - Monitor UO    #FEN   - Diet: DASH/TLC    #Skin:  - Skin on admission: Intact    #Precaution  - Fall, Aspiration, Spinal    #GOC  CODE STATUS: FULL CODE     Outpatient Follow-up (Specialty/Name of physician):    Tuyet Montgomery  Family Medicine  [address] [place] [address]  Phone: [phone]  Fax: [phone]  Follow Up Time: 1 week    John Milton  Orthopaedic Surgery  [address] [facility], [address]  [place] [address]  Phone: [phone]  Fax: [phone]  Follow Up Time: 1 week    Rudy Ross  Otolaryngology  [address] [place] [address]  Phone: [phone]  Fax: [phone]  Follow Up Time:     Dat Langford  Cardiology  [address] [facility], [address]  [place] [address]  Phone: [phone]  Fax: [phone]  Follow Up Time: 2 weeks     Mrs. Nita Diaz is an 89-year-old female patient with PMH of HTN, Dyslipidemia, Hypothyroidism, and cervical fusion who presented to [facility] on 4/14 with back pain of one week from onset. The pain was progressive and located in the lower back, which occurred as she was moving cases of water at some time in preceding week, but doesn't remember a definitive sudden sensation of injury. She was seen by home service and given Tramadol at home once per day, which was helping, but still had difficulty completing ADLs because of the pain. Recent history additionally remarkable for a fall in October in which she landed on her left side. She denied any new urinary or bowel retention or incontinence. and has chronic constipation and urinary incontinence with no new worsening. Has never had a DEXA and has no previous history of fractures.    In the ED, VSS. CT lumbar spine with New mild T12 compression fracture without bony retropulsion since prior CT abdomen and pelvis 10/6/2023, correlate for point tenderness. Mild to moderate spinal canal stenosis or neural foraminal narrowing L2-3 through L4-5. Seen by orthopedics who recommended conservative management with TLSO brace and calcium/vit D supplementation.   Patient was evaluated by PM&R and therapy for functional deficits, gait/ADL impairments and acute rehabilitation was recommended. Patient was medically optimized for discharge to [facility] IRF on 4/17/24.      #Traumatic thoracolumbar spine injuries and thoracic spine compression fracture  - S/P Falls  - T12 compression fracture without bony retropulsion      * TLSO when OOB  - Mild to moderate lumbar spinal canal stenosis, neural foraminal narrowing L2-3 through L4-5  - Impaired ADLs and mobility  - Need for assistance with personal care  - Start Comprehensive Rehab Program: PT/OT, 3hours daily and 5 days weekly      * PT: Focused on improving strength, endurance, coordination, balance, functional mobility, and transfers      * OT: Focused on improving strength, fine motor skills, coordination, posture and ADLs.      #tejas   - BUN/Cr 65/1.67 on 4/19  - IVF   - f/u CMP in AM.     #Osteoporosis  - calcium  BID  - Vit D3    - f/u osteo labs    #HTN  - Lisinopril 10mg daily    #HLD  - Lipitor 20mg daily    #Hypothyroidism  - Synthroid 50mcg daily    #Pain management  - Tylenol PRN decreased   - lidocaine patch b/l low back    #DVT ppx  - Lovenox, SCD, TEDs    #Bowel Regimen  - Senna, miralax daily    #Bladder management  - BS on admission, and q 8 hours (SC if > 400)  - Monitor UO    #FEN   - Diet: DASH/TLC    #Skin:  - Skin on admission: Intact    #Precaution  - Fall, Aspiration, Spinal    #GOC  CODE STATUS: FULL CODE     Outpatient Follow-up (Specialty/Name of physician):    Tuyet Montgomery  Family Medicine  [address] [place] [address]  Phone: [phone]  Fax: [phone]  Follow Up Time: 1 week    John Milton  Orthopaedic Surgery  [address] [facility] [address]  [place] [address]  Phone: [phone]  Fax: [phone]  Follow Up Time: 1 week    Rudy Ross  Otolaryngology  [address] [place] [address]  Phone: [phone]  Fax: [phone]  Follow Up Time:     Dat Langford  Cardiology  [address] [facility], [address]  [place] [address]  Phone: [phone]  Fax: [phone]  Follow Up Time: 2 weeks     Mrs. Nita Diaz is an 89-year-old female patient with PMH of HTN, Dyslipidemia, Hypothyroidism, and cervical fusion who presented to [facility] on 4/14 with back pain of one week from onset. The pain was progressive and located in the lower back, which occurred as she was moving cases of water at some time in preceding week, but doesn't remember a definitive sudden sensation of injury. She was seen by home service and given Tramadol at home once per day, which was helping, but still had difficulty completing ADLs because of the pain. Recent history additionally remarkable for a fall in October in which she landed on her left side. She denied any new urinary or bowel retention or incontinence. and has chronic constipation and urinary incontinence with no new worsening. Has never had a DEXA and has no previous history of fractures.    In the ED, VSS. CT lumbar spine with New mild T12 compression fracture without bony retropulsion since prior CT abdomen and pelvis 10/6/2023, correlate for point tenderness. Mild to moderate spinal canal stenosis or neural foraminal narrowing L2-3 through L4-5. Seen by orthopedics who recommended conservative management with TLSO brace and calcium/vit D supplementation.   Patient was evaluated by PM&R and therapy for functional deficits, gait/ADL impairments and acute rehabilitation was recommended. Patient was medically optimized for discharge to [facility] IRF on 4/17/24.      #Traumatic thoracolumbar spine injuries and thoracic spine compression fracture  - S/P Falls  - T12 compression fracture without bony retropulsion      * TLSO when OOB  - Mild to moderate lumbar spinal canal stenosis, neural foraminal narrowing L2-3 through L4-5  - Impaired ADLs and mobility  - Need for assistance with personal care  - Start Comprehensive Rehab Program: PT/OT, 3hours daily and 5 days weekly      * PT: Focused on improving strength, endurance, coordination, balance, functional mobility, and transfers      * OT: Focused on improving strength, fine motor skills, coordination, posture and ADLs.      #tejas   - BUN/Cr 65/1.67 on 4/19  - IVF   - f/u CMP in AM.     #transaminitis  - monitor  - avoid hepatoxic agents     #Osteoporosis  - calcium  BID  - Vit D3    - f/u osteo labs    #HTN  - Lisinopril 10mg daily    #HLD  - Lipitor 20mg daily    #Hypothyroidism  - Synthroid 50mcg daily    #Pain management  - Tylenol PRN decreased   - lidocaine patch b/l low back    #DVT ppx  - Lovenox, SCD, TEDs    #Bowel Regimen  - Senna, miralax daily    #Bladder management  - BS on admission, and q 8 hours (SC if > 400)  - Monitor UO    #FEN   - Diet: DASH/TLC    #Skin:  - Skin on admission: Intact    #Precaution  - Fall, Aspiration, Spinal    #GOC  CODE STATUS: FULL CODE     Outpatient Follow-up (Specialty/Name of physician):    Tuyet Montgomery  Family Medicine  [address] [place] [address]  Phone: [phone]  Fax: [phone]  Follow Up Time: 1 week    John Milton  Orthopaedic Surgery  [address] [facility] [address]  [place] [address]  Phone: [phone]  Fax: [phone]  Follow Up Time: 1 week    Rudy Ross  Otolaryngology  [address] [place] [address]  Phone: [phone]  Fax: [phone]  Follow Up Time:     Dat Langford  Cardiology  [address] [facility], [address]  [place] [address]  Phone: [phone]  Fax: [phone]  Follow Up Time: 2 weeks

## 2024-04-20 LAB
ANION GAP SERPL CALC-SCNC: 7 MMOL/L — SIGNIFICANT CHANGE UP (ref 5–17)
BUN SERPL-MCNC: 38 MG/DL — HIGH (ref 7–23)
CALCIUM SERPL-MCNC: 9.5 MG/DL — SIGNIFICANT CHANGE UP (ref 8.4–10.5)
CHLORIDE SERPL-SCNC: 105 MMOL/L — SIGNIFICANT CHANGE UP (ref 96–108)
CO2 SERPL-SCNC: 26 MMOL/L — SIGNIFICANT CHANGE UP (ref 22–31)
CREAT SERPL-MCNC: 0.77 MG/DL — SIGNIFICANT CHANGE UP (ref 0.5–1.3)
EGFR: 74 ML/MIN/1.73M2 — SIGNIFICANT CHANGE UP
GLUCOSE SERPL-MCNC: 86 MG/DL — SIGNIFICANT CHANGE UP (ref 70–99)
POTASSIUM SERPL-MCNC: 4.5 MMOL/L — SIGNIFICANT CHANGE UP (ref 3.5–5.3)
POTASSIUM SERPL-SCNC: 4.5 MMOL/L — SIGNIFICANT CHANGE UP (ref 3.5–5.3)
SODIUM SERPL-SCNC: 138 MMOL/L — SIGNIFICANT CHANGE UP (ref 135–145)

## 2024-04-20 PROCEDURE — 99232 SBSQ HOSP IP/OBS MODERATE 35: CPT

## 2024-04-20 RX ADMIN — Medication 800 UNIT(S): at 12:52

## 2024-04-20 RX ADMIN — Medication 650 MILLIGRAM(S): at 05:56

## 2024-04-20 RX ADMIN — POLYETHYLENE GLYCOL 3350 17 GRAM(S): 17 POWDER, FOR SOLUTION ORAL at 12:52

## 2024-04-20 RX ADMIN — LIDOCAINE 1 PATCH: 4 CREAM TOPICAL at 21:25

## 2024-04-20 RX ADMIN — Medication 1 TABLET(S): at 18:05

## 2024-04-20 RX ADMIN — LIDOCAINE 1 PATCH: 4 CREAM TOPICAL at 09:17

## 2024-04-20 RX ADMIN — Medication 50 MICROGRAM(S): at 05:56

## 2024-04-20 RX ADMIN — ENOXAPARIN SODIUM 40 MILLIGRAM(S): 100 INJECTION SUBCUTANEOUS at 18:05

## 2024-04-20 RX ADMIN — Medication 650 MILLIGRAM(S): at 06:55

## 2024-04-20 RX ADMIN — LIDOCAINE 1 PATCH: 4 CREAM TOPICAL at 19:30

## 2024-04-20 RX ADMIN — LIDOCAINE 1 PATCH: 4 CREAM TOPICAL at 19:31

## 2024-04-20 RX ADMIN — LIDOCAINE 1 PATCH: 4 CREAM TOPICAL at 09:18

## 2024-04-20 RX ADMIN — LIDOCAINE 1 PATCH: 4 CREAM TOPICAL at 00:19

## 2024-04-20 RX ADMIN — SENNA PLUS 2 TABLET(S): 8.6 TABLET ORAL at 21:23

## 2024-04-20 RX ADMIN — ATORVASTATIN CALCIUM 20 MILLIGRAM(S): 80 TABLET, FILM COATED ORAL at 21:24

## 2024-04-20 RX ADMIN — Medication 1 TABLET(S): at 05:56

## 2024-04-20 NOTE — PROGRESS NOTE ADULT - SUBJECTIVE AND OBJECTIVE BOX
Patient is a 89y old  Female who presents with a chief complaint of Traumatic thoracolumbar spine injuries and thoracic spine compression fracture (19 Apr 2024 13:41)      Patient seen and examined at bedside.    ALLERGIES:  No Known Allergies    MEDICATIONS  (STANDING):  atorvastatin 20 milliGRAM(s) Oral at bedtime  calcium carbonate   1250 mG (OsCal) 1 Tablet(s) Oral two times a day  cholecalciferol 800 Unit(s) Oral daily  enoxaparin Injectable 40 milliGRAM(s) SubCutaneous every 24 hours  levothyroxine 50 MICROGram(s) Oral daily  lidocaine   4% Patch 1 Patch Transdermal <User Schedule>  lidocaine   4% Patch 1 Patch Transdermal <User Schedule>  polyethylene glycol 3350 17 Gram(s) Oral daily  senna 2 Tablet(s) Oral at bedtime  sodium chloride 0.9%. 1000 milliLiter(s) (75 mL/Hr) IV Continuous <Continuous>    MEDICATIONS  (PRN):  acetaminophen     Tablet .. 650 milliGRAM(s) Oral every 12 hours PRN Mild Pain (1 - 3)  bisacodyl Suppository 10 milliGRAM(s) Rectal daily PRN Constipation    Vital Signs Last 24 Hrs  T(F): 98.1 (19 Apr 2024 20:33), Max: 98.1 (19 Apr 2024 20:33)  HR: 60 (20 Apr 2024 05:53) (60 - 62)  BP: 139/79 (20 Apr 2024 05:53) (131/77 - 139/79)  RR: 18 (19 Apr 2024 20:33) (18 - 18)  SpO2: 97% (19 Apr 2024 20:33) (97% - 97%)  I&O's Summary    BMI (kg/m2): 22.1 (04-17-24 @ 07:14)  PHYSICAL EXAM:  General: NAD, A/O x 3, frail appearing  IV line RUE  ENT: MMM, no scleral icterus  Neck: Supple, No JVD, no thyroidomegaly  Lungs: Clear to auscultation bilaterally, no wheezes, no rales, no rhonchi, good inspiratory effort  Cardio: RRR, S1/S2, No murmurs  Abdomen: Soft, Nontender, Nondistended; Bowel sounds present  Extremities: No calf tenderness, No pitting edema, no skin changes    LABS:                        13.2   8.20  )-----------( 282      ( 18 Apr 2024 05:35 )             39.1       04-19    135  |  98  |  65  ----------------------------<  87  5.0   |  30  |  1.64    Ca    9.2      19 Apr 2024 06:11    TPro  6.5  /  Alb  2.6  /  TBili  0.6  /  DBili  0.1  /  AST  68  /  ALT  90  /  AlkPhos  143  04-19 04-19 Chol 137 mg/dL LDL -- HDL 52 mg/dL Trig 68 mg/dL  TSH --   TSH with FT4 reflex 2.03  Total T3 --    Urinalysis Basic - ( 19 Apr 2024 06:11 )    Color: x / Appearance: x / SG: x / pH: x  Gluc: 87 mg/dL / Ketone: x  / Bili: x / Urobili: x   Blood: x / Protein: x / Nitrite: x   Leuk Esterase: x / RBC: x / WBC x   Sq Epi: x / Non Sq Epi: x / Bacteria: x

## 2024-04-20 NOTE — PROGRESS NOTE ADULT - ASSESSMENT
88 YO female with PMH of HTN, Dyslipidemia, Hypothyroid, cervical fusion, parotid mass, pituitary adenoma s/p resection ( pathology benign)  who presented to [facility] on 4/14 with back pain x 1 week that has been progressive. CT lumbar spine with new mild T12 compression fracture without bony retropulsion. Seen by ortho who recommended conservative management with TLSO brace    #Compression Fracture  - T12 compression fracture without bony retropulsion, no surgical intervention.  - Fracture likely 2/2 osteoporosis  - Activity with TLSO    #KEYANA  - Lisinopril 10mg daily discontinued; monitor SBP  - S/p NS bolus x 1 followed by NS @75ml/hr  - BMP pending  - Encourage oral hydration    #Transaminitis, stable  - Noted acute elevation in LFTs  - ABD ULT WNL  - Would decrease use of tylenol if possible  - Hepatitis panel negative  - LDL 71; for now will c/w lipitor 20mg qhs    #Osteoporosis  -  calcium  BID  - Vit D3      #HTN  - Will monitor SBP off of lisinopril 10mg daily  - Had episodes of hypotension    #HLD  - Lipitor 20mg daily    #Hypothyroidism  - Synthroid 50mcg daily    DVT ppx- Lovenox

## 2024-04-20 NOTE — PROGRESS NOTE ADULT - SUBJECTIVE AND OBJECTIVE BOX
Cc: Gait dysfunction    HPI: Patient seen and examined at bedside. No acute events overnight.   Pain controlled with medications, no chest pain, no N/V, no Fevers/Chills. No other new ROS  Has been tolerating rehabilitation program.    acetaminophen     Tablet .. 650 milliGRAM(s) Oral every 12 hours PRN  atorvastatin 20 milliGRAM(s) Oral at bedtime  bisacodyl Suppository 10 milliGRAM(s) Rectal daily PRN  calcium carbonate   1250 mG (OsCal) 1 Tablet(s) Oral two times a day  cholecalciferol 800 Unit(s) Oral daily  enoxaparin Injectable 40 milliGRAM(s) SubCutaneous every 24 hours  levothyroxine 50 MICROGram(s) Oral daily  lidocaine   4% Patch 1 Patch Transdermal <User Schedule>  lidocaine   4% Patch 1 Patch Transdermal <User Schedule>  polyethylene glycol 3350 17 Gram(s) Oral daily  senna 2 Tablet(s) Oral at bedtime      T(C): 36.7 (04-20-24 @ 08:50), Max: 36.7 (04-19-24 @ 20:33)  HR: 68 (04-20-24 @ 08:50) (60 - 68)  BP: 120/68 (04-20-24 @ 08:50) (120/68 - 139/79)  RR: 16 (04-20-24 @ 08:50) (16 - 18)  SpO2: 96% (04-20-24 @ 08:50) (96% - 97%)    In NAD  HEENT- EOMI  Heart- S1S2  Lungs- CTA bl.  Abd- + BS, NT  Ext- No calf pain  Neuro- Exam unchanged    BMP 4/20/24 reviewed  CMP 4/19/24 reviewed  CMP 4/18/24 reviewed      Imp: Patient with diagnosis of T12 compression fx admitted for comprehensive acute rehabilitation.    Plan:  - Continue PT/OT/SLP as indicated  - DVT prophylaxis - Continue Lovenox  - Skin- Turn q2h, check skin daily  - Continue current medications  -Active issues-   Pain - Continue lidocaine patches, Lidocaine PRN  - Patient is stable to continue current rehabilitation program.

## 2024-04-21 PROCEDURE — 99232 SBSQ HOSP IP/OBS MODERATE 35: CPT

## 2024-04-21 RX ADMIN — LIDOCAINE 1 PATCH: 4 CREAM TOPICAL at 09:01

## 2024-04-21 RX ADMIN — LIDOCAINE 1 PATCH: 4 CREAM TOPICAL at 22:22

## 2024-04-21 RX ADMIN — POLYETHYLENE GLYCOL 3350 17 GRAM(S): 17 POWDER, FOR SOLUTION ORAL at 19:09

## 2024-04-21 RX ADMIN — Medication 1 TABLET(S): at 18:24

## 2024-04-21 RX ADMIN — Medication 50 MICROGRAM(S): at 05:36

## 2024-04-21 RX ADMIN — Medication 1 TABLET(S): at 05:35

## 2024-04-21 RX ADMIN — ENOXAPARIN SODIUM 40 MILLIGRAM(S): 100 INJECTION SUBCUTANEOUS at 18:24

## 2024-04-21 RX ADMIN — LIDOCAINE 1 PATCH: 4 CREAM TOPICAL at 09:00

## 2024-04-21 RX ADMIN — SENNA PLUS 2 TABLET(S): 8.6 TABLET ORAL at 22:24

## 2024-04-21 RX ADMIN — ATORVASTATIN CALCIUM 20 MILLIGRAM(S): 80 TABLET, FILM COATED ORAL at 22:24

## 2024-04-21 RX ADMIN — Medication 800 UNIT(S): at 12:51

## 2024-04-21 NOTE — PROGRESS NOTE ADULT - SUBJECTIVE AND OBJECTIVE BOX
Patient is a 89y old  Female who presents with a chief complaint of Traumatic thoracolumbar spine injuries and thoracic spine compression fracture       Patient seen and examined at bedside.    ALLERGIES:  No Known Allergies    MEDICATIONS  (STANDING):  atorvastatin 20 milliGRAM(s) Oral at bedtime  calcium carbonate   1250 mG (OsCal) 1 Tablet(s) Oral two times a day  cholecalciferol 800 Unit(s) Oral daily  enoxaparin Injectable 40 milliGRAM(s) SubCutaneous every 24 hours  levothyroxine 50 MICROGram(s) Oral daily  lidocaine   4% Patch 1 Patch Transdermal <User Schedule>  lidocaine   4% Patch 1 Patch Transdermal <User Schedule>  polyethylene glycol 3350 17 Gram(s) Oral daily  senna 2 Tablet(s) Oral at bedtime    MEDICATIONS  (PRN):  acetaminophen     Tablet .. 650 milliGRAM(s) Oral every 12 hours PRN Mild Pain (1 - 3)  bisacodyl Suppository 10 milliGRAM(s) Rectal daily PRN Constipation    Vital Signs Last 24 Hrs  T(F): 98.4 (21 Apr 2024 09:04), Max: 98.5 (20 Apr 2024 20:45)  HR: 65 (21 Apr 2024 09:04) (64 - 65)  BP: 132/77 (21 Apr 2024 09:04) (132/77 - 155/83)  RR: 16 (21 Apr 2024 09:04) (16 - 17)  SpO2: 96% (21 Apr 2024 09:04) (96% - 97%)  I&O's Summary    20 Apr 2024 07:01  -  21 Apr 2024 07:00  --------------------------------------------------------  IN: 0 mL / OUT: 900 mL / NET: -900 mL      BMI (kg/m2): 22.1 (04-17-24 @ 07:14)  PHYSICAL EXAM:  General: NAD, A/O x 3, frail appearing  IV line RUE  ENT: MMM, no scleral icterus  Neck: Supple, No JVD, no thyroidomegaly  Lungs: Clear to auscultation bilaterally, no wheezes, no rales, no rhonchi, good inspiratory effort  Cardio: RRR, S1/S2, No murmurs  Abdomen: Soft, Nontender, Nondistended; Bowel sounds present  Extremities: No calf tenderness, No pitting edema, no skin changes    LABS:      04-20    138  |  105  |  38  ----------------------------<  86  4.5   |  26  |  0.77    Ca    9.5      20 Apr 2024 07:00    TPro  6.5  /  Alb  2.6  /  TBili  0.6  /  DBili  0.1  /  AST  68  /  ALT  90  /  AlkPhos  143  04-19 04-19 Chol 137 mg/dL LDL -- HDL 52 mg/dL Trig 68 mg/dL    Urinalysis Basic - ( 20 Apr 2024 07:00 )    Color: x / Appearance: x / SG: x / pH: x  Gluc: 86 mg/dL / Ketone: x  / Bili: x / Urobili: x   Blood: x / Protein: x / Nitrite: x   Leuk Esterase: x / RBC: x / WBC x   Sq Epi: x / Non Sq Epi: x / Bacteria: x

## 2024-04-21 NOTE — PROGRESS NOTE ADULT - SUBJECTIVE AND OBJECTIVE BOX
Cc: Gait dysfunction    HPI: Patient seen and examined at bedside. No acute events overnight.   Pain controlled, no chest pain, no N/V, no Fevers/Chills. No other new ROS  Has been tolerating rehabilitation program.    acetaminophen     Tablet .. 650 milliGRAM(s) Oral every 12 hours PRN  atorvastatin 20 milliGRAM(s) Oral at bedtime  bisacodyl Suppository 10 milliGRAM(s) Rectal daily PRN  calcium carbonate   1250 mG (OsCal) 1 Tablet(s) Oral two times a day  cholecalciferol 800 Unit(s) Oral daily  enoxaparin Injectable 40 milliGRAM(s) SubCutaneous every 24 hours  levothyroxine 50 MICROGram(s) Oral daily  lidocaine   4% Patch 1 Patch Transdermal <User Schedule>  lidocaine   4% Patch 1 Patch Transdermal <User Schedule>  polyethylene glycol 3350 17 Gram(s) Oral daily  senna 2 Tablet(s) Oral at bedtime      T(C): 36.9 (04-21-24 @ 09:04), Max: 36.9 (04-20-24 @ 20:45)  HR: 65 (04-21-24 @ 09:04) (64 - 65)  BP: 132/77 (04-21-24 @ 09:04) (132/77 - 155/83)  RR: 16 (04-21-24 @ 09:04) (16 - 17)  SpO2: 96% (04-21-24 @ 09:04) (96% - 97%)      In NAD  HEENT- EOMI  Heart- S1S2  Lungs- CTA bl.  Abd- + BS, NT  Ext- No calf pain  Neuro- Exam unchanged    BMP 4/20/24 reviewed  CMP 4/19/24 reviewed  CMP 4/18/24 reviewed    CT L Spine 4/14/24 reviewed and interpreted by me:    ACC: [id number] EXAM: CT LUMBAR SPINE ORDERED BY: CARRIE MAYO    PROCEDURE DATE: 04/14/2024        INTERPRETATION: PROCEDURE: CT lumbar spine without contrast    INDICATION: Lumbar radiculopathy    TECHNIQUE: Multiple axial sections were obtained from the thoracolumbar junction through the sacrum. Sagittal and coronal reformats were obtained from the axial data set. The images were reviewed in soft tissue and bone windows.    COMPARISON: MRI brain lumbar spine 5/4/2022 and CT abdomen and pelvis 10/6/2023    FINDINGS: There is levoscoliosis of the lower thoracic and lumbar spine. The bones are osteopenic. There is a new mild T12 compression fracture. The remainder the vertebral body heights are unchanged from prior exam.. The disc spaces are preserved.. Stable 4 cm right adnexal cyst.    At the L1-L2 level there is ligamentum flavum hypertrophy without significant spinal canal stenosis or neural foraminal narrowing.    At the L2-L3 level there is a disc bulge with facet hypertrophy resulting in mild to moderate spinal canal stenosis and bilateral neural foraminal narrowing    At the L3-L4 level there is a disc bulge with facet hypertrophy resulting in mild to moderate spinal canal stenosis with mild bilateral neural foraminal narrowing.      At the L4-L5 level there is uncovering of the posterior disc margin with facet and ligamentum flavum hypertrophy resulting in mild to moderate spinal canal stenosis and bilateral neural foraminal narrowing.    At the L5-S1 level there is no significant disc herniation, spinal canal stenosis or neural foraminal narrowing.    IMPRESSION:    New mild T12 compression fracture without bony retropulsion since prior CT abdomen and pelvis 10/6/2023, correlate for point tenderness.    Mild to moderate spinal canal stenosis or neural foraminal narrowing L2-3 through L4-5.    --- End of Report ---            VICTORINA NAGY MD; Attending Radiologist    Imp: Patient with diagnosis of T12 compression fx admitted for comprehensive acute rehabilitation.    Plan:  - Continue PT/OT/SLP as indicated  - DVT prophylaxis - Continue Lovenox  - Skin- Turn q2h, check skin daily  - Continue current medications  -Active issues-   Pain - Continue lidocaine patches PRN  - Patient is stable to continue current rehabilitation program.

## 2024-04-21 NOTE — PROGRESS NOTE ADULT - ASSESSMENT
89 YO female with PMH of HTN, Dyslipidemia, Hypothyroid, cervical fusion, parotid mass, pituitary adenoma s/p resection ( pathology benign)  who presented to [facility] on 4/14 with back pain x 1 week that has been progressive. CT lumbar spine with new mild T12 compression fracture without bony retropulsion. Seen by ortho who recommended conservative management with TLSO brace    #Compression Fracture  - T12 compression fracture without bony retropulsion, no surgical intervention.  - Fracture likely 2/2 osteoporosis  - Activity with TLSO    #KEYANA, improved  - Lisinopril 10mg daily discontinued; monitor SBP  - S/p NS bolus; will d/c IVF  - Encourage oral hydration    #Transaminitis, stable  - Noted acute elevation in LFTs  - ABD ULT WNL  - Would decrease use of tylenol if possible  - Hepatitis panel negative  - LDL 71; for now will c/w lipitor 20mg qhs    #Osteoporosis  -  calcium  BID  - Vit D3      #HTN  - Will monitor SBP off of lisinopril 10mg daily  - Had episodes of hypotension    #HLD  - Lipitor 20mg daily    #Hypothyroidism  - Synthroid 50mcg daily    DVT ppx- Lovenox

## 2024-04-22 LAB
ALBUMIN SERPL ELPH-MCNC: 3.1 G/DL — LOW (ref 3.3–5)
ALP SERPL-CCNC: 154 U/L — HIGH (ref 40–120)
ALT FLD-CCNC: 57 U/L — HIGH (ref 10–45)
ANION GAP SERPL CALC-SCNC: 7 MMOL/L — SIGNIFICANT CHANGE UP (ref 5–17)
AST SERPL-CCNC: 41 U/L — HIGH (ref 10–40)
BASOPHILS # BLD AUTO: 0.05 K/UL — SIGNIFICANT CHANGE UP (ref 0–0.2)
BASOPHILS NFR BLD AUTO: 0.6 % — SIGNIFICANT CHANGE UP (ref 0–2)
BILIRUB SERPL-MCNC: 0.5 MG/DL — SIGNIFICANT CHANGE UP (ref 0.2–1.2)
BUN SERPL-MCNC: 28 MG/DL — HIGH (ref 7–23)
CALCIUM SERPL-MCNC: 10.6 MG/DL — HIGH (ref 8.4–10.5)
CHLORIDE SERPL-SCNC: 100 MMOL/L — SIGNIFICANT CHANGE UP (ref 96–108)
CO2 SERPL-SCNC: 30 MMOL/L — SIGNIFICANT CHANGE UP (ref 22–31)
CREAT SERPL-MCNC: 0.85 MG/DL — SIGNIFICANT CHANGE UP (ref 0.5–1.3)
EGFR: 65 ML/MIN/1.73M2 — SIGNIFICANT CHANGE UP
EOSINOPHIL # BLD AUTO: 0.13 K/UL — SIGNIFICANT CHANGE UP (ref 0–0.5)
EOSINOPHIL NFR BLD AUTO: 1.6 % — SIGNIFICANT CHANGE UP (ref 0–6)
GLUCOSE SERPL-MCNC: 93 MG/DL — SIGNIFICANT CHANGE UP (ref 70–99)
HCT VFR BLD CALC: 39.1 % — SIGNIFICANT CHANGE UP (ref 34.5–45)
HGB BLD-MCNC: 13.5 G/DL — SIGNIFICANT CHANGE UP (ref 11.5–15.5)
IMM GRANULOCYTES NFR BLD AUTO: 0.2 % — SIGNIFICANT CHANGE UP (ref 0–0.9)
LYMPHOCYTES # BLD AUTO: 1.29 K/UL — SIGNIFICANT CHANGE UP (ref 1–3.3)
LYMPHOCYTES # BLD AUTO: 15.9 % — SIGNIFICANT CHANGE UP (ref 13–44)
MCHC RBC-ENTMCNC: 31.7 PG — SIGNIFICANT CHANGE UP (ref 27–34)
MCHC RBC-ENTMCNC: 34.5 GM/DL — SIGNIFICANT CHANGE UP (ref 32–36)
MCV RBC AUTO: 91.8 FL — SIGNIFICANT CHANGE UP (ref 80–100)
MONOCYTES # BLD AUTO: 0.65 K/UL — SIGNIFICANT CHANGE UP (ref 0–0.9)
MONOCYTES NFR BLD AUTO: 8 % — SIGNIFICANT CHANGE UP (ref 2–14)
NEUTROPHILS # BLD AUTO: 5.99 K/UL — SIGNIFICANT CHANGE UP (ref 1.8–7.4)
NEUTROPHILS NFR BLD AUTO: 73.7 % — SIGNIFICANT CHANGE UP (ref 43–77)
NRBC # BLD: 0 /100 WBCS — SIGNIFICANT CHANGE UP (ref 0–0)
PLATELET # BLD AUTO: 309 K/UL — SIGNIFICANT CHANGE UP (ref 150–400)
POTASSIUM SERPL-MCNC: 4.3 MMOL/L — SIGNIFICANT CHANGE UP (ref 3.5–5.3)
POTASSIUM SERPL-SCNC: 4.3 MMOL/L — SIGNIFICANT CHANGE UP (ref 3.5–5.3)
PROT SERPL-MCNC: 7.3 G/DL — SIGNIFICANT CHANGE UP (ref 6–8.3)
RBC # BLD: 4.26 M/UL — SIGNIFICANT CHANGE UP (ref 3.8–5.2)
RBC # FLD: 12.4 % — SIGNIFICANT CHANGE UP (ref 10.3–14.5)
SODIUM SERPL-SCNC: 137 MMOL/L — SIGNIFICANT CHANGE UP (ref 135–145)
WBC # BLD: 8.13 K/UL — SIGNIFICANT CHANGE UP (ref 3.8–10.5)
WBC # FLD AUTO: 8.13 K/UL — SIGNIFICANT CHANGE UP (ref 3.8–10.5)

## 2024-04-22 PROCEDURE — 99232 SBSQ HOSP IP/OBS MODERATE 35: CPT | Mod: GC

## 2024-04-22 PROCEDURE — 99232 SBSQ HOSP IP/OBS MODERATE 35: CPT

## 2024-04-22 RX ADMIN — Medication 1 TABLET(S): at 17:55

## 2024-04-22 RX ADMIN — Medication 50 MICROGRAM(S): at 05:27

## 2024-04-22 RX ADMIN — Medication 1 TABLET(S): at 05:27

## 2024-04-22 RX ADMIN — SENNA PLUS 2 TABLET(S): 8.6 TABLET ORAL at 21:07

## 2024-04-22 RX ADMIN — Medication 10 MILLIGRAM(S): at 21:07

## 2024-04-22 RX ADMIN — ATORVASTATIN CALCIUM 20 MILLIGRAM(S): 80 TABLET, FILM COATED ORAL at 21:07

## 2024-04-22 RX ADMIN — ENOXAPARIN SODIUM 40 MILLIGRAM(S): 100 INJECTION SUBCUTANEOUS at 17:55

## 2024-04-22 RX ADMIN — Medication 800 UNIT(S): at 12:54

## 2024-04-22 NOTE — PROGRESS NOTE ADULT - ASSESSMENT
Mrs. Nita Diaz is an 89-year-old female patient with PMH of HTN, Dyslipidemia, Hypothyroidism, and cervical fusion who presented to [facility] on 4/14 with back pain of one week from onset. The pain was progressive and located in the lower back, which occurred as she was moving cases of water at some time in preceding week, but doesn't remember a definitive sudden sensation of injury. She was seen by home service and given Tramadol at home once per day, which was helping, but still had difficulty completing ADLs because of the pain. Recent history additionally remarkable for a fall in October in which she landed on her left side. She denied any new urinary or bowel retention or incontinence. and has chronic constipation and urinary incontinence with no new worsening. Has never had a DEXA and has no previous history of fractures.    In the ED, VSS. CT lumbar spine with New mild T12 compression fracture without bony retropulsion since prior CT abdomen and pelvis 10/6/2023, correlate for point tenderness. Mild to moderate spinal canal stenosis or neural foraminal narrowing L2-3 through L4-5. Seen by orthopedics who recommended conservative management with TLSO brace and calcium/vit D supplementation.   Patient was evaluated by PM&R and therapy for functional deficits, gait/ADL impairments and acute rehabilitation was recommended. Patient was medically optimized for discharge to [facility] IRF on 4/17/24.      #Traumatic thoracolumbar spine injuries and thoracic spine compression fracture  - S/P Falls  - T12 compression fracture without bony retropulsion      * TLSO when OOB  - Mild to moderate lumbar spinal canal stenosis, neural foraminal narrowing L2-3 through L4-5  - Impaired ADLs and mobility  - Need for assistance with personal care  - Start Comprehensive Rehab Program: PT/OT, 3hours daily and 5 days weekly      * PT: Focused on improving strength, endurance, coordination, balance, functional mobility, and transfers      * OT: Focused on improving strength, fine motor skills, coordination, posture and ADLs.      #orthostatic Hypotension  - teds/SCDs/abd binder  - if OH vitals + then will start midodrine 5mg before therapy.     #KEYANA   - BUN/Cr 65/1.67 on 4/19  - IVF   - improved.     #transaminitis  - monitor  - avoid hepatoxic agents     #Osteoporosis  - calcium  BID  - Vit D3    - f/u osteo labs    #HTN  - Lisinopril 10mg daily    #HLD  - Lipitor 20mg daily    #Hypothyroidism  - Synthroid 50mcg daily    #Pain management  - Tylenol PRN decreased   - lidocaine patch b/l low back    #DVT ppx  - Lovenox, SCD, TEDs    #Bowel Regimen  - Senna, miralax daily    #Bladder management  - BS on admission, and q 8 hours (SC if > 400)  - Monitor UO    #FEN   - Diet: DASH/TLC    #Skin:  - Skin on admission: Intact    #Precaution  - Fall, Aspiration, Spinal    #GOC  CODE STATUS: FULL CODE     Outpatient Follow-up (Specialty/Name of physician):    Tuyet Montgomery  Family Medicine  [address] [place] [address]  Phone: [phone]  Fax: [phone]  Follow Up Time: 1 week    John Milton  Orthopaedic Surgery  [address]  [place] [address]  Phone: [phone]  Fax: [phone]  Follow Up Time: 1 week    Rudy Ross  Otolaryngology  [address] [place] [address]  Phone: [phone]  Fax: [phone]  Follow Up Time:     Dat Langford  Cardiology  [address], [address]  [place] [address]  Phone: [phone]  Fax: [phone]  Follow Up Time: 2 weeks

## 2024-04-22 NOTE — PROGRESS NOTE ADULT - SUBJECTIVE AND OBJECTIVE BOX
CC: Patient is a 89y old  Female who presents with a chief complaint of Traumatic thoracolumbar spine injuries and thoracic spine compression fracture (21 Apr 2024 10:31)      Interval History:    Overnight had palpitations.  Feeling well today.      ALLERGIES:  No Known Allergies    MEDICATIONS  (STANDING):  atorvastatin 20 milliGRAM(s) Oral at bedtime  calcium carbonate   1250 mG (OsCal) 1 Tablet(s) Oral two times a day  cholecalciferol 800 Unit(s) Oral daily  enoxaparin Injectable 40 milliGRAM(s) SubCutaneous every 24 hours  levothyroxine 50 MICROGram(s) Oral daily  lidocaine   4% Patch 1 Patch Transdermal <User Schedule>  lidocaine   4% Patch 1 Patch Transdermal <User Schedule>  polyethylene glycol 3350 17 Gram(s) Oral daily  senna 2 Tablet(s) Oral at bedtime    MEDICATIONS  (PRN):  acetaminophen     Tablet .. 650 milliGRAM(s) Oral every 12 hours PRN Mild Pain (1 - 3)  bisacodyl Suppository 10 milliGRAM(s) Rectal daily PRN Constipation    Vital Signs Last 24 Hrs  T(F): 98 (22 Apr 2024 09:05), Max: 98 (22 Apr 2024 09:05)  HR: 61 (22 Apr 2024 09:05) (61 - 62)  BP: 154/89 (22 Apr 2024 09:05) (130/74 - 154/89)  RR: 16 (22 Apr 2024 09:05) (16 - 18)  SpO2: 96% (22 Apr 2024 09:05) (96% - 98%)  I&O's Summary    BMI (kg/m2): 23.1 (04-22-24 @ 09:05)    PHYSICAL EXAM:  GENERAL: NAD  CHEST/LUNG: No rales, rhonchi, wheezing; normal respiratory effort  HEART: RRR; No murmurs, rubs, or gallops  ABDOMEN: Soft, Nontender, Nondistended; Bowel sounds present  MSK/EXT:  No peripheral edema, 2+ Peripheral Pulses, No clubbing or digital cyanosis  PSYCH: Appropriate affect, Alert & Oriented    LABS:                        13.5   8.13  )-----------( 309      ( 22 Apr 2024 09:30 )             39.1       04-22    137  |  100  |  28  ----------------------------<  93  4.3   |  30  |  0.85    Ca    10.6      22 Apr 2024 09:30    TPro  7.3  /  Alb  3.1  /  TBili  0.5  /  DBili  x   /  AST  41  /  ALT  57  /  AlkPhos  154  04-22                04-19 Chol 137 mg/dL LDL -- HDL 52 mg/dL Trig 68 mg/dL                  Urinalysis Basic - ( 22 Apr 2024 09:30 )    Color: x / Appearance: x / SG: x / pH: x  Gluc: 93 mg/dL / Ketone: x  / Bili: x / Urobili: x   Blood: x / Protein: x / Nitrite: x   Leuk Esterase: x / RBC: x / WBC x   Sq Epi: x / Non Sq Epi: x / Bacteria: x            Care Discussed with Consultants/Other Providers: Yes

## 2024-04-22 NOTE — PROGRESS NOTE ADULT - ATTENDING COMMENTS
Rehab Attending- Patient seen and examined by me - Case discussed, above note reviewed by me with modifications made    patient seen and examined Bedside, then seen in PT gym  Reports feeling Better- Voiding  reports rosalia Lower back pain - using lidoderm patches- Less tylenol secondary to elevated LFTs  had good BM today  Labs reviewed- BUN/Cr 65/1.6  Hospitalist held lisinopril, giving IV Fluids- Check BMP in AM  Will Check PVR  On Exam BLES with FROM with MS 4/5 hip flexors, otherwise 5/5  Brisk reflexes LES Compared to uppers - plantars downgoing  sensation intact all ext  ambulates with narrow base of support- ? long tract  to continue intensive rehab program Rehab Attending- Patient seen and examined by me - Case discussed, above note reviewed by me with modifications made    Patient seen and examined Bedside  no reported events over weekend  BPs low in therapy- orthostatics ordered  will start Midodrine if orthostatics +  labs reviewed by me- stable  to continue intensive rehab program

## 2024-04-22 NOTE — PROGRESS NOTE ADULT - ASSESSMENT
88 YO female with PMH of HTN, Dyslipidemia, Hypothyroid, cervical fusion, parotid mass, pituitary adenoma s/p resection ( pathology benign)  who presented to [facility] on 4/14 with back pain x 1 week that has been progressive. CT lumbar spine with new mild T12 compression fracture without bony retropulsion. Seen by ortho who recommended conservative management with TLSO brace    #Compression Fracture  - T12 compression fracture without bony retropulsion, no surgical intervention.  - Fracture likely 2/2 osteoporosis  - Activity with TLSO    #palpitations - overnight  -Recent thyroid studies WNL on 4/18  -monitor for recurrence  -if happens again, would get ekg  -trops neg x 2 overnight    #KEYANA, improved  - Lisinopril 10mg daily discontinued; monitor SBP  - S/p NS bolus; and IVFs  - Encourage oral hydration    #Transaminitis, stable  - Noted acute elevation in LFTs, now downtrending  - ABD ULT WNL  - limited use of tylenol as needed  - Hepatitis panel negative  - LDL 71; for now will c/w lipitor 20mg qhs    #Osteoporosis  -  calcium  BID  - Vit D3      #HTN - stable  - Will monitor SBP off of lisinopril 10mg daily  - Had episodes of hypotension    #HLD  - Lipitor 20mg daily    #Hypothyroidism  - Synthroid 50mcg daily    DVT ppx- Lovenox

## 2024-04-22 NOTE — PROGRESS NOTE ADULT - SUBJECTIVE AND OBJECTIVE BOX
HPI:  Mrs. Nita Diaz is an 89-year-old female patient with PMH of HTN, Dyslipidemia, Hypothyroidism, and cervical fusion who presented to [facility] on 4/14 with back pain of one week from onset. The pain was progressive and located in the lower back, which occurred as she was moving cases of water at some time in preceding week, but doesn't remember a definitive sudden sensation of injury. She was seen by home service and given Tramadol at home once per day, which was helping, but still had difficulty completing ADLs because of the pain. Recent history additionally remarkable for a fall in October in which she landed on her left side. She denied any new urinary or bowel retention or incontinence. and has chronic constipation and urinary incontinence with no new worsening. Has never had a DEXA and has no previous history of fractures.    In the ED, VSS. CT lumbar spine with New mild T12 compression fracture without bony retropulsion since prior CT abdomen and pelvis 10/6/2023, correlate for point tenderness. Mild to moderate spinal canal stenosis or neural foraminal narrowing L2-3 through L4-5. Seen by orthopedics who recommended conservative management with TLSO brace and calcium/vit D supplementation.     Patient was evaluated by PM&R and therapy for functional deficits, gait/ADL impairments and acute rehabilitation was recommended. Patient was medically optimized for discharge to [facility] IRF on 4/17/24. (17 Apr 2024 13:16)    TDD: TBD    ___________________________________________________________________________    SUBJECTIVE/ROS  Patient was seen and evaluated at bedside today.  Reported no overnight events and is in no acute distress.  This morning with therapy she was orthostatic and dizzy. BP while lying down wnl.   RN made aware to order and input orthostatic vitals into EMR.   Eager to participate on the recommended rehabilitation program.  Denies any CP, SOB, GONZALES, palpitations, fever, chills, body aches, cough, congestion, or any other symptoms at this time.   ___________________________________________________________________________    Vital Signs Last 24 Hrs  T(C): 36.7 (22 Apr 2024 09:05), Max: 36.7 (22 Apr 2024 09:05)  T(F): 98 (22 Apr 2024 09:05), Max: 98 (22 Apr 2024 09:05)  HR: 61 (22 Apr 2024 09:05) (61 - 62)  BP: 78/50 (22 Apr 2024 11:20) (78/50 - 154/89)  BP(mean): --  RR: 16 (22 Apr 2024 09:05) (16 - 18)  SpO2: 96% (22 Apr 2024 09:05) (96% - 98%)    Parameters below as of 22 Apr 2024 09:05  Patient On (Oxygen Delivery Method): room air    ___________________________________________________________________________    LABS                          13.5   8.13  )-----------( 309      ( 22 Apr 2024 09:30 )             39.1     04-22    137  |  100  |  28<H>  ----------------------------<  93  4.3   |  30  |  0.85    Ca    10.6<H>      22 Apr 2024 09:30    TPro  7.3  /  Alb  3.1<L>  /  TBili  0.5  /  DBili  x   /  AST  41<H>  /  ALT  57<H>  /  AlkPhos  154<H>  04-22      ___________________________________________________________________________    MEDICATIONS  (STANDING):  atorvastatin 20 milliGRAM(s) Oral at bedtime  calcium carbonate   1250 mG (OsCal) 1 Tablet(s) Oral two times a day  cholecalciferol 800 Unit(s) Oral daily  enoxaparin Injectable 40 milliGRAM(s) SubCutaneous every 24 hours  levothyroxine 50 MICROGram(s) Oral daily  lidocaine   4% Patch 1 Patch Transdermal <User Schedule>  lidocaine   4% Patch 1 Patch Transdermal <User Schedule>  polyethylene glycol 3350 17 Gram(s) Oral daily  senna 2 Tablet(s) Oral at bedtime    MEDICATIONS  (PRN):  acetaminophen     Tablet .. 650 milliGRAM(s) Oral every 12 hours PRN Mild Pain (1 - 3)  bisacodyl Suppository 10 milliGRAM(s) Rectal daily PRN Constipation    ___________________________________________________________________________    Gen - NAD, Comfortable  HEENT - NCAT, EOMI, MMM  Neck - Supple, No limited ROM  Pulm - CTAB, No wheeze, No rhonchi, No crackles  Cardiovascular - RRR, S1S2, + murmurs  Abdomen - Soft, NT/ND, +BS  Extremities - No clubbing, no cyanosis, no peripheral edema, no calf tenderness  Neuro-     Cognitive - Awake, Alert, Oriented  to self, place, date, year, situation. Able  to follow command     Communication - Fluent, Comprehensible     Attention: Intact      Memory: Recall 3 objects immediate and 3 min later         Cranial Nerves - CN 2-12 intact     Motor -                    LEFT    UE - 4/5                    RIGHT UE - 4/5                    LEFT    LE - 3/5                    RIGHT LE - 3/5        Sensory - Intact to LT     Reflexes - DTR Intact, No primitive reflexive     Coordination - FTN intact     Tone - normal  Psychiatric - Mood stable, Affect WNL  Skin:  all skin intact    ___________________________________________________________________________ HPI:  Mrs. Nita Diaz is an 89-year-old female patient with PMH of HTN, Dyslipidemia, Hypothyroidism, and cervical fusion who presented to [facility] on 4/14 with back pain of one week from onset. The pain was progressive and located in the lower back, which occurred as she was moving cases of water at some time in preceding week, but doesn't remember a definitive sudden sensation of injury. She was seen by home service and given Tramadol at home once per day, which was helping, but still had difficulty completing ADLs because of the pain. Recent history additionally remarkable for a fall in October in which she landed on her left side. She denied any new urinary or bowel retention or incontinence. and has chronic constipation and urinary incontinence with no new worsening. Has never had a DEXA and has no previous history of fractures.    In the ED, VSS. CT lumbar spine with New mild T12 compression fracture without bony retropulsion since prior CT abdomen and pelvis 10/6/2023, correlate for point tenderness. Mild to moderate spinal canal stenosis or neural foraminal narrowing L2-3 through L4-5. Seen by orthopedics who recommended conservative management with TLSO brace and calcium/vit D supplementation.     Patient was evaluated by PM&R and therapy for functional deficits, gait/ADL impairments and acute rehabilitation was recommended. Patient was medically optimized for discharge to [facility] IRF on 4/17/24. (17 Apr 2024 13:16)    TDD: TBD    ___________________________________________________________________________    SUBJECTIVE/ROS  Patient was seen and evaluated at bedside today.  Reported no overnight events and is in no acute distress.  This morning with therapy she was orthostatic and dizzy. BP while lying down wnl.   RN made aware to order and input orthostatic vitals into EMR.   + constipation. RN also made aware to give suppository   Eager to participate on the recommended rehabilitation program.  Denies any CP, SOB, GONZALES, palpitations, fever, chills, body aches, cough, congestion, or any other symptoms at this time.   ___________________________________________________________________________    Vital Signs Last 24 Hrs  T(C): 36.7 (22 Apr 2024 09:05), Max: 36.7 (22 Apr 2024 09:05)  T(F): 98 (22 Apr 2024 09:05), Max: 98 (22 Apr 2024 09:05)  HR: 61 (22 Apr 2024 09:05) (61 - 62)  BP: 78/50 (22 Apr 2024 11:20) (78/50 - 154/89)  BP(mean): --  RR: 16 (22 Apr 2024 09:05) (16 - 18)  SpO2: 96% (22 Apr 2024 09:05) (96% - 98%)    Parameters below as of 22 Apr 2024 09:05  Patient On (Oxygen Delivery Method): room air    ___________________________________________________________________________    LABS                          13.5   8.13  )-----------( 309      ( 22 Apr 2024 09:30 )             39.1     04-22    137  |  100  |  28<H>  ----------------------------<  93  4.3   |  30  |  0.85    Ca    10.6<H>      22 Apr 2024 09:30    TPro  7.3  /  Alb  3.1<L>  /  TBili  0.5  /  DBili  x   /  AST  41<H>  /  ALT  57<H>  /  AlkPhos  154<H>  04-22      ___________________________________________________________________________    MEDICATIONS  (STANDING):  atorvastatin 20 milliGRAM(s) Oral at bedtime  calcium carbonate   1250 mG (OsCal) 1 Tablet(s) Oral two times a day  cholecalciferol 800 Unit(s) Oral daily  enoxaparin Injectable 40 milliGRAM(s) SubCutaneous every 24 hours  levothyroxine 50 MICROGram(s) Oral daily  lidocaine   4% Patch 1 Patch Transdermal <User Schedule>  lidocaine   4% Patch 1 Patch Transdermal <User Schedule>  polyethylene glycol 3350 17 Gram(s) Oral daily  senna 2 Tablet(s) Oral at bedtime    MEDICATIONS  (PRN):  acetaminophen     Tablet .. 650 milliGRAM(s) Oral every 12 hours PRN Mild Pain (1 - 3)  bisacodyl Suppository 10 milliGRAM(s) Rectal daily PRN Constipation    ___________________________________________________________________________    Gen - NAD, Comfortable  HEENT - NCAT, EOMI, MMM  Neck - Supple, No limited ROM  Pulm - CTAB, No wheeze, No rhonchi, No crackles  Cardiovascular - RRR, S1S2, + murmurs  Abdomen - Soft, NT/ND, +BS  Extremities - No clubbing, no cyanosis, no peripheral edema, no calf tenderness  Neuro-     Cognitive - Awake, Alert, Oriented  to self, place, date, year, situation. Able  to follow command     Communication - Fluent, Comprehensible     Attention: Intact      Memory: Recall 3 objects immediate and 3 min later         Cranial Nerves - CN 2-12 intact     Motor -                    LEFT    UE - 4/5                    RIGHT UE - 4/5                    LEFT    LE - 3/5                    RIGHT LE - 3/5        Sensory - Intact to LT     Reflexes - DTR Intact, No primitive reflexive     Coordination - FTN intact     Tone - normal  Psychiatric - Mood stable, Affect WNL  Skin:  all skin intact    ___________________________________________________________________________

## 2024-04-23 PROCEDURE — 99232 SBSQ HOSP IP/OBS MODERATE 35: CPT

## 2024-04-23 PROCEDURE — 99232 SBSQ HOSP IP/OBS MODERATE 35: CPT | Mod: GC

## 2024-04-23 RX ORDER — CHOLECALCIFEROL (VITAMIN D3) 125 MCG
1000 CAPSULE ORAL DAILY
Refills: 0 | Status: DISCONTINUED | OUTPATIENT
Start: 2024-04-23 | End: 2024-05-04

## 2024-04-23 RX ADMIN — LIDOCAINE 1 PATCH: 4 CREAM TOPICAL at 20:11

## 2024-04-23 RX ADMIN — ATORVASTATIN CALCIUM 20 MILLIGRAM(S): 80 TABLET, FILM COATED ORAL at 20:15

## 2024-04-23 RX ADMIN — Medication 1 TABLET(S): at 05:14

## 2024-04-23 RX ADMIN — ENOXAPARIN SODIUM 40 MILLIGRAM(S): 100 INJECTION SUBCUTANEOUS at 17:05

## 2024-04-23 RX ADMIN — Medication 50 MICROGRAM(S): at 05:14

## 2024-04-23 RX ADMIN — POLYETHYLENE GLYCOL 3350 17 GRAM(S): 17 POWDER, FOR SOLUTION ORAL at 12:15

## 2024-04-23 RX ADMIN — LIDOCAINE 1 PATCH: 4 CREAM TOPICAL at 08:44

## 2024-04-23 RX ADMIN — Medication 1 TABLET(S): at 17:05

## 2024-04-23 RX ADMIN — LIDOCAINE 1 PATCH: 4 CREAM TOPICAL at 08:45

## 2024-04-23 RX ADMIN — Medication 1000 UNIT(S): at 12:14

## 2024-04-23 NOTE — PROGRESS NOTE ADULT - ASSESSMENT
88 YO female with PMH of HTN, Dyslipidemia, Hypothyroid, cervical fusion, parotid mass, pituitary adenoma s/p resection ( pathology benign)  who presented to [facility] on 4/14 with back pain x 1 week that has been progressive. CT lumbar spine with new mild T12 compression fracture without bony retropulsion. Seen by ortho who recommended conservative management with TLSO brace    #Compression Fracture  - T12 compression fracture without bony retropulsion, no surgical intervention.  - Fracture likely 2/2 osteoporosis  - Activity with TLSO    #palpitations - resolved  -Recent thyroid studies WNL on 4/18  -monitor for recurrence  -if happens again, would get ekg  -trops neg x 2     #KEYANA, improved  - Lisinopril 10mg daily discontinued; monitor SBP  - S/p NS bolus; and IVFs  - Encourage oral hydration    #Transaminitis, stable  - Noted acute elevation in LFTs, now downtrending  - ABD ULT WNL  - limited use of tylenol as needed  - Hepatitis panel negative  - LDL 71; for now will c/w lipitor 20mg qhs    #Osteoporosis  -  calcium  BID  - Vit D3      #HTN  #orthostasis  - Will monitor SBP off of lisinopril 10mg daily  - Had episodes of hypotension  - d/w primary team. full set of orthostatic VS today. Add Midodrine 5mg BID before therapy    #HLD  - Lipitor 20mg daily    #Hypothyroidism  - Synthroid 50mcg daily    DVT ppx- Lovenox

## 2024-04-23 NOTE — PROGRESS NOTE ADULT - SUBJECTIVE AND OBJECTIVE BOX
HPI:  Mrs. Nita Diaz is an 89-year-old female patient with PMH of HTN, Dyslipidemia, Hypothyroidism, and cervical fusion who presented to [facility] on 4/14 with back pain of one week from onset. The pain was progressive and located in the lower back, which occurred as she was moving cases of water at some time in preceding week, but doesn't remember a definitive sudden sensation of injury. She was seen by home service and given Tramadol at home once per day, which was helping, but still had difficulty completing ADLs because of the pain. Recent history additionally remarkable for a fall in October in which she landed on her left side. She denied any new urinary or bowel retention or incontinence. and has chronic constipation and urinary incontinence with no new worsening. Has never had a DEXA and has no previous history of fractures.    In the ED, VSS. CT lumbar spine with New mild T12 compression fracture without bony retropulsion since prior CT abdomen and pelvis 10/6/2023, correlate for point tenderness. Mild to moderate spinal canal stenosis or neural foraminal narrowing L2-3 through L4-5. Seen by orthopedics who recommended conservative management with TLSO brace and calcium/vit D supplementation.     Patient was evaluated by PM&R and therapy for functional deficits, gait/ADL impairments and acute rehabilitation was recommended. Patient was medically optimized for discharge to [facility] IRF on 4/17/24. (17 Apr 2024 13:16)    TDD: 5/4 home    ___________________________________________________________________________    SUBJECTIVE/ROS  Patient was seen and evaluated at bedside today.  Reported no overnight events and is in no acute distress.  This morning with therapy she was orthostatic and dizzy. BP while lying down wnl.   RN made aware to order and input orthostatic vitals into EMR.   + constipation. RN also made aware to give suppository   Eager to participate on the recommended rehabilitation program.  Denies any CP, SOB, GONZALES, palpitations, fever, chills, body aches, cough, congestion, or any other symptoms at this time.   ___________________________________________________________________________    Vital Signs Last 24 Hrs  T(C): 36.4 (23 Apr 2024 07:37), Max: 36.4 (22 Apr 2024 20:00)  T(F): 97.6 (23 Apr 2024 07:37), Max: 97.6 (22 Apr 2024 20:00)  HR: 56 (23 Apr 2024 07:37) (56 - 66)  BP: 126/74 (23 Apr 2024 07:37) (100/60 - 126/74)  BP(mean): --  RR: 16 (23 Apr 2024 07:37) (16 - 18)  SpO2: 97% (23 Apr 2024 07:37) (97% - 98%)    Parameters below as of 23 Apr 2024 07:37  Patient On (Oxygen Delivery Method): room air    ___________________________________________________________________________    LABS                          13.5   8.13  )-----------( 309      ( 22 Apr 2024 09:30 )             39.1     04-22    137  |  100  |  28<H>  ----------------------------<  93  4.3   |  30  |  0.85    Ca    10.6<H>      22 Apr 2024 09:30    TPro  7.3  /  Alb  3.1<L>  /  TBili  0.5  /  DBili  x   /  AST  41<H>  /  ALT  57<H>  /  AlkPhos  154<H>  04-22      ___________________________________________________________________________    MEDICATIONS  (STANDING):  atorvastatin 20 milliGRAM(s) Oral at bedtime  calcium carbonate   1250 mG (OsCal) 1 Tablet(s) Oral two times a day  cholecalciferol 1000 Unit(s) Oral daily  enoxaparin Injectable 40 milliGRAM(s) SubCutaneous every 24 hours  levothyroxine 50 MICROGram(s) Oral daily  lidocaine   4% Patch 1 Patch Transdermal <User Schedule>  lidocaine   4% Patch 1 Patch Transdermal <User Schedule>  polyethylene glycol 3350 17 Gram(s) Oral daily  senna 2 Tablet(s) Oral at bedtime    MEDICATIONS  (PRN):  acetaminophen     Tablet .. 650 milliGRAM(s) Oral every 12 hours PRN Mild Pain (1 - 3)  bisacodyl Suppository 10 milliGRAM(s) Rectal daily PRN Constipation    ___________________________________________________________________________    Gen - NAD, Comfortable  HEENT - NCAT, EOMI, MMM  Neck - Supple, No limited ROM  Pulm - CTAB, No wheeze, No rhonchi, No crackles  Cardiovascular - RRR, S1S2, + murmurs  Abdomen - Soft, NT/ND, +BS  Extremities - No clubbing, no cyanosis, no peripheral edema, no calf tenderness  Neuro-     Cognitive - Awake, Alert, Oriented  to self, place, date, year, situation. Able  to follow command     Communication - Fluent, Comprehensible     Attention: Intact      Memory: Recall 3 objects immediate and 3 min later         Cranial Nerves - CN 2-12 intact     Motor -                    LEFT    UE - 4/5                    RIGHT UE - 4/5                    LEFT    LE - 3/5                    RIGHT LE - 3/5        Sensory - Intact to LT     Reflexes - DTR Intact, No primitive reflexive     Coordination - FTN intact     Tone - normal  Psychiatric - Mood stable, Affect WNL  Skin:  all skin intact    ___________________________________________________________________________

## 2024-04-23 NOTE — PROGRESS NOTE ADULT - SUBJECTIVE AND OBJECTIVE BOX
CC: Patient is a 89y old  Female who presents with a chief complaint of Traumatic thoracolumbar spine injuries and thoracic spine compression fracture (22 Apr 2024 14:43)      Interval History:    Patient seen and examined at bedside.  No overnight events.  No new complaints.    ALLERGIES:  No Known Allergies    MEDICATIONS  (STANDING):  atorvastatin 20 milliGRAM(s) Oral at bedtime  calcium carbonate   1250 mG (OsCal) 1 Tablet(s) Oral two times a day  cholecalciferol 1000 Unit(s) Oral daily  enoxaparin Injectable 40 milliGRAM(s) SubCutaneous every 24 hours  levothyroxine 50 MICROGram(s) Oral daily  lidocaine   4% Patch 1 Patch Transdermal <User Schedule>  lidocaine   4% Patch 1 Patch Transdermal <User Schedule>  polyethylene glycol 3350 17 Gram(s) Oral daily  senna 2 Tablet(s) Oral at bedtime    MEDICATIONS  (PRN):  acetaminophen     Tablet .. 650 milliGRAM(s) Oral every 12 hours PRN Mild Pain (1 - 3)  bisacodyl Suppository 10 milliGRAM(s) Rectal daily PRN Constipation    Vital Signs Last 24 Hrs  T(F): 97.6 (23 Apr 2024 07:37), Max: 97.6 (22 Apr 2024 20:00)  HR: 56 (23 Apr 2024 07:37) (56 - 66)  BP: 126/74 (23 Apr 2024 07:37) (78/50 - 126/74)  RR: 16 (23 Apr 2024 07:37) (16 - 18)  SpO2: 97% (23 Apr 2024 07:37) (97% - 98%)  I&O's Summary    BMI (kg/m2): 23.1 (04-22-24 @ 09:05)    PHYSICAL EXAM:  GENERAL: NAD  CHEST/LUNG: No rales, rhonchi, wheezing; normal respiratory effort  HEART: RRR; No murmurs, rubs, or gallops  ABDOMEN: Soft, Nontender, Nondistended; Bowel sounds present  MSK/EXT:  No peripheral edema, 2+ Peripheral Pulses, No clubbing or digital cyanosis  PSYCH: Appropriate affect, Alert & Oriented    LABS:                        13.5   8.13  )-----------( 309      ( 22 Apr 2024 09:30 )             39.1       04-22    137  |  100  |  28  ----------------------------<  93  4.3   |  30  |  0.85    Ca    10.6      22 Apr 2024 09:30    TPro  7.3  /  Alb  3.1  /  TBili  0.5  /  DBili  x   /  AST  41  /  ALT  57  /  AlkPhos  154  04-22     04-19 Chol 137 mg/dL LDL -- HDL 52 mg/dL Trig 68 mg/dL          Urinalysis Basic - ( 22 Apr 2024 09:30 )    Color: x / Appearance: x / SG: x / pH: x  Gluc: 93 mg/dL / Ketone: x  / Bili: x / Urobili: x   Blood: x / Protein: x / Nitrite: x   Leuk Esterase: x / RBC: x / WBC x   Sq Epi: x / Non Sq Epi: x / Bacteria: x      Care Discussed with Consultants/Other Providers: Yes

## 2024-04-23 NOTE — PROGRESS NOTE ADULT - ASSESSMENT
Mrs. Nita Diaz is an 89-year-old female patient with PMH of HTN, Dyslipidemia, Hypothyroidism, and cervical fusion who presented to [facility] on 4/14 with back pain of one week from onset. The pain was progressive and located in the lower back, which occurred as she was moving cases of water at some time in preceding week, but doesn't remember a definitive sudden sensation of injury. She was seen by home service and given Tramadol at home once per day, which was helping, but still had difficulty completing ADLs because of the pain. Recent history additionally remarkable for a fall in October in which she landed on her left side. She denied any new urinary or bowel retention or incontinence. and has chronic constipation and urinary incontinence with no new worsening. Has never had a DEXA and has no previous history of fractures.    In the ED, VSS. CT lumbar spine with New mild T12 compression fracture without bony retropulsion since prior CT abdomen and pelvis 10/6/2023, correlate for point tenderness. Mild to moderate spinal canal stenosis or neural foraminal narrowing L2-3 through L4-5. Seen by orthopedics who recommended conservative management with TLSO brace and calcium/vit D supplementation.   Patient was evaluated by PM&R and therapy for functional deficits, gait/ADL impairments and acute rehabilitation was recommended. Patient was medically optimized for discharge to [facility] IRF on 4/17/24.      #Traumatic thoracolumbar spine injuries and thoracic spine compression fracture  - S/P Falls  - T12 compression fracture without bony retropulsion      * TLSO when OOB  - Mild to moderate lumbar spinal canal stenosis, neural foraminal narrowing L2-3 through L4-5  - Impaired ADLs and mobility  - Need for assistance with personal care  - Start Comprehensive Rehab Program: PT/OT, 3hours daily and 5 days weekly      * PT: Focused on improving strength, endurance, coordination, balance, functional mobility, and transfers      * OT: Focused on improving strength, fine motor skills, coordination, posture and ADLs.      #orthostatic Hypotension  - teds/SCDs/abd binder  - if OH vitals + then will start midodrine 5mg before therapy.   - OH negative, so not starting     #KEYANA   - BUN/Cr 65/1.67 on 4/19  - IVF   - improved.     #transaminitis  - monitor  - avoid hepatoxic agents     #Osteoporosis  - calcium  BID  - Vit D3    - f/u osteo labs    #HTN  - Lisinopril 10mg daily    #HLD  - Lipitor 20mg daily    #Hypothyroidism  - Synthroid 50mcg daily    #Pain management  - Tylenol PRN decreased   - lidocaine patch b/l low back    #DVT ppx  - Lovenox, SCD, TEDs    #Bowel Regimen  - Senna, miralax daily    #Bladder management  - BS on admission, and q 8 hours (SC if > 400)  - Monitor UO    #FEN   - Diet: DASH/TLC    #Skin:  - Skin on admission: Intact    #Precaution  - Fall, Aspiration, Spinal    #GOC  CODE STATUS: FULL CODE     Outpatient Follow-up (Specialty/Name of physician):    Tuyet Montgomery  Family Medicine  [address] [place] [address]  Phone: [phone]  Fax: [phone]  Follow Up Time: 1 week    John Milton  Orthopaedic Surgery  [address] [facility] [facility], [address]  [place] [address]  Phone: [phone]  Fax: [phone]  Follow Up Time: 1 week    Rudy Ross  Otolaryngology  [address] [place] [address]  Phone: [phone]  Fax: [phone]  Follow Up Time:     Dat Langford  Cardiology  [address] [facility], [address]  [place] [address]  Phone: [phone]  Fax: [phone]  Follow Up Time: 2 weeks

## 2024-04-24 PROCEDURE — 99232 SBSQ HOSP IP/OBS MODERATE 35: CPT | Mod: GC

## 2024-04-24 RX ADMIN — LIDOCAINE 1 PATCH: 4 CREAM TOPICAL at 17:50

## 2024-04-24 RX ADMIN — Medication 1000 UNIT(S): at 11:26

## 2024-04-24 RX ADMIN — ATORVASTATIN CALCIUM 20 MILLIGRAM(S): 80 TABLET, FILM COATED ORAL at 20:50

## 2024-04-24 RX ADMIN — Medication 1 TABLET(S): at 05:41

## 2024-04-24 RX ADMIN — Medication 1 TABLET(S): at 17:22

## 2024-04-24 RX ADMIN — LIDOCAINE 1 PATCH: 4 CREAM TOPICAL at 05:43

## 2024-04-24 RX ADMIN — LIDOCAINE 1 PATCH: 4 CREAM TOPICAL at 07:42

## 2024-04-24 RX ADMIN — Medication 50 MICROGRAM(S): at 05:42

## 2024-04-24 RX ADMIN — LIDOCAINE 1 PATCH: 4 CREAM TOPICAL at 05:42

## 2024-04-24 RX ADMIN — ENOXAPARIN SODIUM 40 MILLIGRAM(S): 100 INJECTION SUBCUTANEOUS at 17:22

## 2024-04-24 RX ADMIN — LIDOCAINE 1 PATCH: 4 CREAM TOPICAL at 07:43

## 2024-04-24 RX ADMIN — SENNA PLUS 2 TABLET(S): 8.6 TABLET ORAL at 20:50

## 2024-04-24 NOTE — PROGRESS NOTE ADULT - SUBJECTIVE AND OBJECTIVE BOX
HPI:  Mrs. Nita Diaz is an 89-year-old female patient with PMH of HTN, Dyslipidemia, Hypothyroidism, and cervical fusion who presented to [facility] on 4/14 with back pain of one week from onset. The pain was progressive and located in the lower back, which occurred as she was moving cases of water at some time in preceding week, but doesn't remember a definitive sudden sensation of injury. She was seen by home service and given Tramadol at home once per day, which was helping, but still had difficulty completing ADLs because of the pain. Recent history additionally remarkable for a fall in October in which she landed on her left side. She denied any new urinary or bowel retention or incontinence. and has chronic constipation and urinary incontinence with no new worsening. Has never had a DEXA and has no previous history of fractures.    In the ED, VSS. CT lumbar spine with New mild T12 compression fracture without bony retropulsion since prior CT abdomen and pelvis 10/6/2023, correlate for point tenderness. Mild to moderate spinal canal stenosis or neural foraminal narrowing L2-3 through L4-5. Seen by orthopedics who recommended conservative management with TLSO brace and calcium/vit D supplementation.     Patient was evaluated by PM&R and therapy for functional deficits, gait/ADL impairments and acute rehabilitation was recommended. Patient was medically optimized for discharge to [facility] IRF on 4/17/24. (17 Apr 2024 13:16)    TDD: 5/4 home    ___________________________________________________________________________    SUBJECTIVE/ROS  Patient was seen and evaluated at bedside today.  Reported no overnight events and is in no acute distress.  BPs reportedly Fluctuate- No dizziness or Symptoms in therapy  had episode of Emesis this AM attributable to food?  last BM 4/23  Some back pain- took tylenol- will discuss with ortho need for TLSO on DC  Eager to participate on the recommended rehabilitation program.  Denies any CP, SOB, GONZALES, palpitations, fever, chills, body aches, cough, congestion, or any other symptoms at this time.   ___________________________________________________________________________    Vital Signs Last 24 Hrs  T(C): 37.1 (24 Apr 2024 07:41), Max: 37.1 (24 Apr 2024 07:41)  T(F): 98.7 (24 Apr 2024 07:41), Max: 98.7 (24 Apr 2024 07:41)  HR: 74 (24 Apr 2024 07:41) (61 - 74)  BP: 123/85 (24 Apr 2024 07:41) (122/76 - 123/85)  BP(mean): --  RR: 16 (24 Apr 2024 07:41) (16 - 16)  SpO2: 98% (24 Apr 2024 07:41) (97% - 98%)    Parameters below as of 24 Apr 2024 07:41  Patient On (Oxygen Delivery Method): room air        ___________________________________________________________________________    LABS                          13.5   8.13  )-----------( 309      ( 22 Apr 2024 09:30 )             39.1     04-22    137  |  100  |  28<H>  ----------------------------<  93  4.3   |  30  |  0.85    Ca    10.6<H>      22 Apr 2024 09:30    TPro  7.3  /  Alb  3.1<L>  /  TBili  0.5  /  DBili  x   /  AST  41<H>  /  ALT  57<H>  /  AlkPhos  154<H>  04-22      ___________________________________________________________________________    MEDICATIONS  (STANDING):  atorvastatin 20 milliGRAM(s) Oral at bedtime  calcium carbonate   1250 mG (OsCal) 1 Tablet(s) Oral two times a day  cholecalciferol 1000 Unit(s) Oral daily  enoxaparin Injectable 40 milliGRAM(s) SubCutaneous every 24 hours  levothyroxine 50 MICROGram(s) Oral daily  lidocaine   4% Patch 1 Patch Transdermal <User Schedule>  lidocaine   4% Patch 1 Patch Transdermal <User Schedule>  polyethylene glycol 3350 17 Gram(s) Oral daily  senna 2 Tablet(s) Oral at bedtime    MEDICATIONS  (PRN):  acetaminophen     Tablet .. 650 milliGRAM(s) Oral every 12 hours PRN Mild Pain (1 - 3)  bisacodyl Suppository 10 milliGRAM(s) Rectal daily PRN Constipation      ___________________________________________________________________________    Gen - NAD, Comfortable  HEENT - NCAT, EOMI, MMM  Neck - Supple, No limited ROM  Pulm - CTAB, No wheeze, No rhonchi, No crackles  Cardiovascular - RRR, S1S2, + murmurs  Abdomen - Soft, NT/ND, +BS  Extremities - No clubbing, no cyanosis, no peripheral edema, no calf tenderness  Neuro-     Cognitive - Awake, Alert, Oriented  to self, place, date, year, situation. Able  to follow command     Communication - Fluent, Comprehensible     Attention: Intact      Memory: Recall 3 objects immediate and 3 min later         Cranial Nerves - CN 2-12 intact     Motor -                    LEFT    UE - 4+/5                    RIGHT UE - 4+/5                    LEFT    LE - 4/5                    RIGHT LE - 4/5        Sensory - Intact to LT     Reflexes - DTR brisk over patella Hasmukh, No primitive reflexes ? increased ext tone LEs     Coordination - FTN intact     Tone - normal  Psychiatric - Mood stable, Affect WNL  Skin:  all skin intact    ___________________________________________________________________________

## 2024-04-24 NOTE — PROGRESS NOTE ADULT - ASSESSMENT
Mrs. Nita Diaz is an 89-year-old female patient with PMH of HTN, Dyslipidemia, Hypothyroidism, and cervical fusion who presented to [facility] on 4/14 with back pain of one week from onset. The pain was progressive and located in the lower back, which occurred as she was moving cases of water at some time in preceding week, but doesn't remember a definitive sudden sensation of injury. She was seen by home service and given Tramadol at home once per day, which was helping, but still had difficulty completing ADLs because of the pain. Recent history additionally remarkable for a fall in October in which she landed on her left side. She denied any new urinary or bowel retention or incontinence. and has chronic constipation and urinary incontinence with no new worsening. Has never had a DEXA and has no previous history of fractures.    In the ED, VSS. CT lumbar spine with New mild T12 compression fracture without bony retropulsion since prior CT abdomen and pelvis 10/6/2023, correlate for point tenderness. Mild to moderate spinal canal stenosis or neural foraminal narrowing L2-3 through L4-5. Seen by orthopedics who recommended conservative management with TLSO brace and calcium/vit D supplementation.   Patient was evaluated by PM&R and therapy for functional deficits, gait/ADL impairments and acute rehabilitation was recommended. Patient was medically optimized for discharge to [facility] IRF on 4/17/24.      #Traumatic thoracolumbar spine injuries and thoracic spine compression fracture  - S/P Falls  - T12 compression fracture without bony retropulsion      * TLSO when OOB  - Mild to moderate lumbar spinal canal stenosis, neural foraminal narrowing L2-3 through L4-5  - Impaired ADLs and mobility  - Need for assistance with personal care  - Comprehensive Rehab Program: PT/OT, 3hours daily and 5 days weekly      * PT: Focused on improving strength, endurance, coordination, balance, functional mobility, and transfers      * OT: Focused on improving strength, fine motor skills, coordination, posture and ADLs.      #orthostatic Hypotension  - teds/SCDs/abd binder  - if OH vitals + then will start midodrine 5mg before therapy.   - OH negative, so not starting     #KEYANA   - BUN/Cr 65/1.67 on 4/19  - IVF   - improved.   Check CMP in AM    #transaminitis  - monitor  - avoid hepatoxic agents  Check CMP in AM     #Osteoporosis  - calcium  BID  - Vit D3    - f/u osteo labs    #HTN  - Lisinopril 10mg daily    #HLD  - Lipitor 20mg daily    #Hypothyroidism  - Synthroid 50mcg daily    #Pain management  - Tylenol PRN decreased   - lidocaine patch b/l low back    #DVT ppx  - Lovenox, SCD, TEDs    #Bowel Regimen  - Senna, miralax daily    #Bladder management  - BS on admission, and q 8 hours (SC if > 400)  - Monitor UO    #FEN   - Diet: DASH/TLC    #Skin:  - Skin on admission: Intact    #Precaution  - Fall, Aspiration, Spinal    #GOC  CODE STATUS: FULL CODE     Outpatient Follow-up (Specialty/Name of physician):    Tuyet Montgomery  Family Medicine  [address] [place] [address]  Phone: [phone]  Fax: [phone]  Follow Up Time: 1 week    John Milton  Orthopaedic Surgery  [address] [facility], [address]  [place] [address]  Phone: [phone]  Fax: [phone]  Follow Up Time: 1 week    Rudy Ross  Otolaryngology  [address] [place] [address]  Phone: [phone]  Fax: [phone]  Follow Up Time:     Dat Langford  Cardiology  [address] [facility], [address]  [place] [address]  Phone: [phone]  Fax: [phone]  Follow Up Time: 2 weeks

## 2024-04-24 NOTE — CHART NOTE - NSCHARTNOTEFT_GEN_A_CORE
Nutrition Follow Up Note  Hospital Course   (Per Electronic Medical Record)    Source:  Patient [X]  Medical Record [X]      Diet:   DASH-TLC Diet w/ Thin Liquids (IDDSI Level 0)  Tolerates Diet Consistency Well  No Chewing/Swallowing Difficulties  No Recent Nausea, Diarrhea or Constipation & Some Recent Vomiting (as Per Patient)  Consumes % of Meals (as Per Documentation) - States Fair Intake (Per Patient)   on Ensure Plus High Protein 8oz PO Daily (Provides 350kcal & 20grams of Protein)  Patient Takes Nutrition Supplement Well  Obtained Food Preferences from Patient    Enteral/Parenteral Nutrition: Not Applicable    Current Weight: 130.5lb on 4/21  Obtain New Weight  Obtain Weights Weekly     Pertinent Medications: MEDICATIONS  (STANDING):  atorvastatin 20 milliGRAM(s) Oral at bedtime  calcium carbonate   1250 mG (OsCal) 1 Tablet(s) Oral two times a day  cholecalciferol 1000 Unit(s) Oral daily  enoxaparin Injectable 40 milliGRAM(s) SubCutaneous every 24 hours  levothyroxine 50 MICROGram(s) Oral daily  lidocaine   4% Patch 1 Patch Transdermal <User Schedule>  lidocaine   4% Patch 1 Patch Transdermal <User Schedule>  polyethylene glycol 3350 17 Gram(s) Oral daily  senna 2 Tablet(s) Oral at bedtime    MEDICATIONS  (PRN):  acetaminophen     Tablet .. 650 milliGRAM(s) Oral every 12 hours PRN Mild Pain (1 - 3)  bisacodyl Suppository 10 milliGRAM(s) Rectal daily PRN Constipation    Pertinent Labs:  04-22 Na137 mmol/L Glu 93 mg/dL K+ 4.3 mmol/L Cr  0.85 mg/dL BUN 28 mg/dL<H> 04-22 Alb 3.1 g/dL<L> 04-19 Chol 137 mg/dL LDL --    HDL 52 mg/dL Trig 68 mg/dL    Skin: No Pressure Ulcers     Edema: None Noted (as Per Documentation)     Last Bowel Movement: on 4/23    Estimated Needs:   [X] No Change Since Previous Assessment    Previous Nutrition Diagnosis:   No Active Nutrition Dx @ This Present Time    Nutrition Diagnosis is [X] Not Applicable     New Nutrition Diagnosis: [X] Not Applicable    Interventions:   1. Recommend Continue Nutrition Plan of Care     Monitoring & Evaluation:   [X] Weights   [X] PO Intake   [X] Skin Integrity   [X] Follow Up (Per Protocol)  [X] Tolerance to Diet Prescription   [X] Other: Labs     Registered Dietitian/Nutritionist Remains Available.  Errol Chatman, GOLDN, CDN    Phone# [phone]

## 2024-04-25 LAB
ALBUMIN SERPL ELPH-MCNC: 2.8 G/DL — LOW (ref 3.3–5)
ALP SERPL-CCNC: 158 U/L — HIGH (ref 40–120)
ALT FLD-CCNC: 77 U/L — HIGH (ref 10–45)
ANION GAP SERPL CALC-SCNC: 10 MMOL/L — SIGNIFICANT CHANGE UP (ref 5–17)
AST SERPL-CCNC: 52 U/L — HIGH (ref 10–40)
BASOPHILS # BLD AUTO: 0.04 K/UL — SIGNIFICANT CHANGE UP (ref 0–0.2)
BASOPHILS NFR BLD AUTO: 0.6 % — SIGNIFICANT CHANGE UP (ref 0–2)
BILIRUB SERPL-MCNC: 0.4 MG/DL — SIGNIFICANT CHANGE UP (ref 0.2–1.2)
BUN SERPL-MCNC: 33 MG/DL — HIGH (ref 7–23)
CALCIUM SERPL-MCNC: 9.5 MG/DL — SIGNIFICANT CHANGE UP (ref 8.4–10.5)
CHLORIDE SERPL-SCNC: 101 MMOL/L — SIGNIFICANT CHANGE UP (ref 96–108)
CO2 SERPL-SCNC: 27 MMOL/L — SIGNIFICANT CHANGE UP (ref 22–31)
CREAT SERPL-MCNC: 0.69 MG/DL — SIGNIFICANT CHANGE UP (ref 0.5–1.3)
EGFR: 83 ML/MIN/1.73M2 — SIGNIFICANT CHANGE UP
EOSINOPHIL # BLD AUTO: 0.17 K/UL — SIGNIFICANT CHANGE UP (ref 0–0.5)
EOSINOPHIL NFR BLD AUTO: 2.4 % — SIGNIFICANT CHANGE UP (ref 0–6)
GLUCOSE SERPL-MCNC: 87 MG/DL — SIGNIFICANT CHANGE UP (ref 70–99)
HCT VFR BLD CALC: 36.2 % — SIGNIFICANT CHANGE UP (ref 34.5–45)
HGB BLD-MCNC: 12.2 G/DL — SIGNIFICANT CHANGE UP (ref 11.5–15.5)
IMM GRANULOCYTES NFR BLD AUTO: 0.3 % — SIGNIFICANT CHANGE UP (ref 0–0.9)
LYMPHOCYTES # BLD AUTO: 2.1 K/UL — SIGNIFICANT CHANGE UP (ref 1–3.3)
LYMPHOCYTES # BLD AUTO: 29.1 % — SIGNIFICANT CHANGE UP (ref 13–44)
MCHC RBC-ENTMCNC: 31.1 PG — SIGNIFICANT CHANGE UP (ref 27–34)
MCHC RBC-ENTMCNC: 33.7 GM/DL — SIGNIFICANT CHANGE UP (ref 32–36)
MCV RBC AUTO: 92.3 FL — SIGNIFICANT CHANGE UP (ref 80–100)
MONOCYTES # BLD AUTO: 0.61 K/UL — SIGNIFICANT CHANGE UP (ref 0–0.9)
MONOCYTES NFR BLD AUTO: 8.5 % — SIGNIFICANT CHANGE UP (ref 2–14)
NEUTROPHILS # BLD AUTO: 4.27 K/UL — SIGNIFICANT CHANGE UP (ref 1.8–7.4)
NEUTROPHILS NFR BLD AUTO: 59.1 % — SIGNIFICANT CHANGE UP (ref 43–77)
NRBC # BLD: 0 /100 WBCS — SIGNIFICANT CHANGE UP (ref 0–0)
PLATELET # BLD AUTO: 336 K/UL — SIGNIFICANT CHANGE UP (ref 150–400)
POTASSIUM SERPL-MCNC: 4.2 MMOL/L — SIGNIFICANT CHANGE UP (ref 3.5–5.3)
POTASSIUM SERPL-SCNC: 4.2 MMOL/L — SIGNIFICANT CHANGE UP (ref 3.5–5.3)
PROT SERPL-MCNC: 6.5 G/DL — SIGNIFICANT CHANGE UP (ref 6–8.3)
RBC # BLD: 3.92 M/UL — SIGNIFICANT CHANGE UP (ref 3.8–5.2)
RBC # FLD: 12.5 % — SIGNIFICANT CHANGE UP (ref 10.3–14.5)
SODIUM SERPL-SCNC: 138 MMOL/L — SIGNIFICANT CHANGE UP (ref 135–145)
WBC # BLD: 7.21 K/UL — SIGNIFICANT CHANGE UP (ref 3.8–10.5)
WBC # FLD AUTO: 7.21 K/UL — SIGNIFICANT CHANGE UP (ref 3.8–10.5)

## 2024-04-25 PROCEDURE — 99232 SBSQ HOSP IP/OBS MODERATE 35: CPT | Mod: GC

## 2024-04-25 RX ADMIN — Medication 50 MICROGRAM(S): at 05:03

## 2024-04-25 RX ADMIN — Medication 1000 UNIT(S): at 11:16

## 2024-04-25 RX ADMIN — LIDOCAINE 1 PATCH: 4 CREAM TOPICAL at 07:46

## 2024-04-25 RX ADMIN — LIDOCAINE 1 PATCH: 4 CREAM TOPICAL at 18:42

## 2024-04-25 RX ADMIN — LIDOCAINE 1 PATCH: 4 CREAM TOPICAL at 06:21

## 2024-04-25 RX ADMIN — ENOXAPARIN SODIUM 40 MILLIGRAM(S): 100 INJECTION SUBCUTANEOUS at 17:15

## 2024-04-25 RX ADMIN — Medication 1 TABLET(S): at 06:19

## 2024-04-25 RX ADMIN — Medication 1 TABLET(S): at 17:15

## 2024-04-25 RX ADMIN — ATORVASTATIN CALCIUM 20 MILLIGRAM(S): 80 TABLET, FILM COATED ORAL at 21:04

## 2024-04-25 NOTE — PROGRESS NOTE ADULT - ASSESSMENT
Mrs. Nita Diaz is an 89-year-old female patient with PMH of HTN, Dyslipidemia, Hypothyroidism, and cervical fusion who presented to [facility] on 4/14 with back pain of one week from onset. The pain was progressive and located in the lower back, which occurred as she was moving cases of water at some time in preceding week, but doesn't remember a definitive sudden sensation of injury. She was seen by home service and given Tramadol at home once per day, which was helping, but still had difficulty completing ADLs because of the pain. Recent history additionally remarkable for a fall in October in which she landed on her left side. She denied any new urinary or bowel retention or incontinence. and has chronic constipation and urinary incontinence with no new worsening. Has never had a DEXA and has no previous history of fractures.    In the ED, VSS. CT lumbar spine with New mild T12 compression fracture without bony retropulsion since prior CT abdomen and pelvis 10/6/2023, correlate for point tenderness. Mild to moderate spinal canal stenosis or neural foraminal narrowing L2-3 through L4-5. Seen by orthopedics who recommended conservative management with TLSO brace and calcium/vit D supplementation.   Patient was evaluated by PM&R and therapy for functional deficits, gait/ADL impairments and acute rehabilitation was recommended. Patient was medically optimized for discharge to [facility] on 4/17/24.      #Traumatic thoracolumbar spine injuries and thoracic spine compression fracture  - S/P Falls  - T12 compression fracture without bony retropulsion      * TLSO when OOB  - Mild to moderate lumbar spinal canal stenosis, neural foraminal narrowing L2-3 through L4-5  - Impaired ADLs and mobility  - Need for assistance with personal care  - Comprehensive Rehab Program: PT/OT, 3hours daily and 5 days weekly      * PT: Focused on improving strength, endurance, coordination, balance, functional mobility, and transfers      * OT: Focused on improving strength, fine motor skills, coordination, posture and ADLs.      #orthostatic Hypotension  - teds/SCDs/abd binder  - if OH vitals + then will start midodrine 5mg before therapy.   - OH negative, so not starting     #KEYANA   - BUN/Cr 65/1.67 on 4/19  - IVF   - improved.   Check CMP in AM    #transaminitis  - monitor  - avoid hepatoxic agents  Check CMP in AM     #Osteoporosis  - calcium  BID  - Vit D3    - f/u osteo labs    #HTN  - Lisinopril 10mg daily    #HLD  - Lipitor 20mg daily    #Hypothyroidism  - Synthroid 50mcg daily    #Pain management  - Tylenol PRN decreased   - lidocaine patch b/l low back    #DVT ppx  - Lovenox, SCD, TEDs    #Bowel Regimen  - Senna, miralax daily    #Bladder management  - BS on admission, and q 8 hours (SC if > 400)  - Monitor UO    #FEN   - Diet: DASH/TLC    #Skin:  - Skin on admission: Intact    #Precaution  - Fall, Aspiration, Spinal    #GOC  CODE STATUS: FULL CODE     Outpatient Follow-up (Specialty/Name of physician):    Tuyet Montgomery  Family Medicine  [address] [place] [address]  Phone: [phone]  Fax: [phone]  Follow Up Time: 1 week    John Milton  Orthopaedic Surgery  [address] [facility], [address]  [place] [address]  Phone: [phone]  Fax: [phone]  Follow Up Time: 1 week    Rudy Ross  Otolaryngology  [address] [place] [address]  Phone: [phone]  Fax: [phone]  Follow Up Time:     Dat Langford  Cardiology  [address] [facility], [address]  [place] [address]  Phone: [phone]  Fax: [phone]  Follow Up Time: 2 weeks     Mrs. Nita Diaz is an 89-year-old female patient with PMH of HTN, Dyslipidemia, Hypothyroidism, and cervical fusion who presented to [facility] on 4/14 with back pain of one week from onset. The pain was progressive and located in the lower back, which occurred as she was moving cases of water at some time in preceding week, but doesn't remember a definitive sudden sensation of injury. She was seen by home service and given Tramadol at home once per day, which was helping, but still had difficulty completing ADLs because of the pain. Recent history additionally remarkable for a fall in October in which she landed on her left side. She denied any new urinary or bowel retention or incontinence. and has chronic constipation and urinary incontinence with no new worsening. Has never had a DEXA and has no previous history of fractures.    In the ED, VSS. CT lumbar spine with New mild T12 compression fracture without bony retropulsion since prior CT abdomen and pelvis 10/6/2023, correlate for point tenderness. Mild to moderate spinal canal stenosis or neural foraminal narrowing L2-3 through L4-5. Seen by orthopedics who recommended conservative management with TLSO brace and calcium/vit D supplementation.   Patient was evaluated by PM&R and therapy for functional deficits, gait/ADL impairments and acute rehabilitation was recommended. Patient was medically optimized for discharge to [facility] on 4/17/24.      #Traumatic thoracolumbar spine injuries and thoracic spine compression fracture  - S/P Falls  - T12 compression fracture without bony retropulsion      * TLSO when OOB  - Mild to moderate lumbar spinal canal stenosis, neural foraminal narrowing L2-3 through L4-5  - Impaired ADLs and mobility  - Need for assistance with personal care  - Comprehensive Rehab Program: PT/OT, 3hours daily and 5 days weekly      * PT: Focused on improving strength, endurance, coordination, balance, functional mobility, and transfers      * OT: Focused on improving strength, fine motor skills, coordination, posture and ADLs.      #orthostatic Hypotension  - teds/SCDs/abd binder  - if OH vitals + then will start midodrine 5mg before therapy.   - OH negative, so not starting     #KEYANA   - BUN/Cr 65/1.67 on 4/19  - IVF   - improved.       #transaminitis  - monitor  - avoid hepatoxic agents  - elevated but stable    #Osteoporosis  - calcium  BID  - Vit D3  increased to 1000iu daily.   - discussed possibility of starting bisphosphonates, patient would consider.     #HTN  - Lisinopril 10mg daily    #HLD  - Lipitor 20mg daily    #Hypothyroidism  - Synthroid 50mcg daily    #Pain management  - Tylenol PRN decreased   - lidocaine patch b/l low back    #DVT ppx  - Lovenox, SCD, TEDs    #Bowel Regimen  - Senna, miralax daily    #Bladder management  - BS on admission, and q 8 hours (SC if > 400)  - Monitor UO    #FEN   - Diet: DASH/TLC    #Skin:  - Skin on admission: Intact    #Precaution  - Fall, Aspiration, Spinal    #GOC  CODE STATUS: FULL CODE     Outpatient Follow-up (Specialty/Name of physician):    Tuyet Montgomery  Family Medicine  [address] [place] [id number]  Phone: [phone]  Fax: [phone]  Follow Up Time: 1 week    John Milton  Orthopaedic Surgery  [address] [facility], [address]  [place] [address]  Phone: [phone]  Fax: [phone]  Follow Up Time: 1 week    Rudy Ross  Otolaryngology  [address] [place] [address]  Phone: [phone]  Fax: [phone]  Follow Up Time:     Dat Langford  Cardiology  [address] [facility] [address] [facility], [address]  [place] [address]  Phone: [phone]  Fax: [phone]  Follow Up Time: 2 weeks

## 2024-04-25 NOTE — PROGRESS NOTE ADULT - SUBJECTIVE AND OBJECTIVE BOX
HPI:  Mrs. Nita Diaz is an 89-year-old female patient with PMH of HTN, Dyslipidemia, Hypothyroidism, and cervical fusion who presented to [facility] on 4/14 with back pain of one week from onset. The pain was progressive and located in the lower back, which occurred as she was moving cases of water at some time in preceding week, but doesn't remember a definitive sudden sensation of injury. She was seen by home service and given Tramadol at home once per day, which was helping, but still had difficulty completing ADLs because of the pain. Recent history additionally remarkable for a fall in October in which she landed on her left side. She denied any new urinary or bowel retention or incontinence. and has chronic constipation and urinary incontinence with no new worsening. Has never had a DEXA and has no previous history of fractures.    In the ED, VSS. CT lumbar spine with New mild T12 compression fracture without bony retropulsion since prior CT abdomen and pelvis 10/6/2023, correlate for point tenderness. Mild to moderate spinal canal stenosis or neural foraminal narrowing L2-3 through L4-5. Seen by orthopedics who recommended conservative management with TLSO brace and calcium/vit D supplementation.     Patient was evaluated by PM&R and therapy for functional deficits, gait/ADL impairments and acute rehabilitation was recommended. Patient was medically optimized for discharge to [facility] on 4/17/24. (17 Apr 2024 13:16)    TDD: 5/4 home  ___________________________________________________________________________    SUBJECTIVE/ROS  Patient was seen and evaluated at bedside and therapy today.  Reported no overnight events and is in no acute distress.     **not complete...***      BPs reportedly Fluctuate- No dizziness or Symptoms in therapy  had episode of Emesis this AM attributable to food?  last BM 4/23  Some back pain- took tylenol- will discuss with ortho need for TLSO on DC  Eager to participate on the recommended rehabilitation program.  Denies any CP, SOB, GONZALES, palpitations, fever, chills, body aches, cough, congestion, or any other symptoms at this time.   ___________________________________________________________________________  Vital Signs Last 24 Hrs  T(C): 36.6 (25 Apr 2024 07:45), Max: 37 (24 Apr 2024 20:48)  T(F): 97.9 (25 Apr 2024 07:45), Max: 98.6 (24 Apr 2024 20:48)  HR: 57 (25 Apr 2024 07:45) (57 - 64)  BP: 127/61 (25 Apr 2024 07:45) (122/63 - 127/61)  BP(mean): --  RR: 16 (25 Apr 2024 07:45) (16 - 16)  SpO2: 96% (25 Apr 2024 07:45) (96% - 96%)    Parameters below as of 25 Apr 2024 07:45  Patient On (Oxygen Delivery Method): room air    ___________________________________________________________________________  LABS                          12.2   7.21  )-----------( 336      ( 25 Apr 2024 05:32 )             36.2     04-25    138  |  101  |  33<H>  ----------------------------<  87  4.2   |  27  |  0.69    Ca    9.5      25 Apr 2024 05:32    TPro  6.5  /  Alb  2.8<L>  /  TBili  0.4  /  DBili  x   /  AST  52<H>  /  ALT  77<H>  /  AlkPhos  158<H>  04-25      ___________________________________________________________________________    MEDICATIONS  (STANDING):  atorvastatin 20 milliGRAM(s) Oral at bedtime  calcium carbonate   1250 mG (OsCal) 1 Tablet(s) Oral two times a day  cholecalciferol 1000 Unit(s) Oral daily  enoxaparin Injectable 40 milliGRAM(s) SubCutaneous every 24 hours  levothyroxine 50 MICROGram(s) Oral daily  lidocaine   4% Patch 1 Patch Transdermal <User Schedule>  lidocaine   4% Patch 1 Patch Transdermal <User Schedule>  polyethylene glycol 3350 17 Gram(s) Oral daily  senna 2 Tablet(s) Oral at bedtime    MEDICATIONS  (PRN):  acetaminophen     Tablet .. 650 milliGRAM(s) Oral every 12 hours PRN Mild Pain (1 - 3)  bisacodyl Suppository 10 milliGRAM(s) Rectal daily PRN Constipation    ___________________________________________________________________________    Gen - NAD, Comfortable  HEENT - NCAT, EOMI, MMM  Neck - Supple, No limited ROM  Pulm - CTAB, No wheeze, No rhonchi, No crackles  Cardiovascular - RRR, S1S2, + murmurs  Abdomen - Soft, NT/ND, +BS  Extremities - No clubbing, no cyanosis, no peripheral edema, no calf tenderness  Neuro-     Cognitive - Awake, Alert, Oriented  to self, place, date, year, situation. Able  to follow command     Communication - Fluent, Comprehensible     Attention: Intact      Memory: Recall 3 objects immediate and 3 min later         Cranial Nerves - CN 2-12 intact     Motor -                    LEFT    UE - 4+/5                    RIGHT UE - 4+/5                    LEFT    LE - 4/5                    RIGHT LE - 4/5        Sensory - Intact to LT     Reflexes - DTR brisk over patella Hasmukh, No primitive reflexes ? increased ext tone LEs     Coordination - FTN intact     Tone - normal  Psychiatric - Mood stable, Affect WNL  Skin:  all skin intact    ___________________________________________________________________________ HPI:  Mrs. Nita Diaz is an 89-year-old female patient with PMH of HTN, Dyslipidemia, Hypothyroidism, and cervical fusion who presented to [facility] on 4/14 with back pain of one week from onset. The pain was progressive and located in the lower back, which occurred as she was moving cases of water at some time in preceding week, but doesn't remember a definitive sudden sensation of injury. She was seen by home service and given Tramadol at home once per day, which was helping, but still had difficulty completing ADLs because of the pain. Recent history additionally remarkable for a fall in October in which she landed on her left side. She denied any new urinary or bowel retention or incontinence. and has chronic constipation and urinary incontinence with no new worsening. Has never had a DEXA and has no previous history of fractures.    In the ED, VSS. CT lumbar spine with New mild T12 compression fracture without bony retropulsion since prior CT abdomen and pelvis 10/6/2023, correlate for point tenderness. Mild to moderate spinal canal stenosis or neural foraminal narrowing L2-3 through L4-5. Seen by orthopedics who recommended conservative management with TLSO brace and calcium/vit D supplementation.     Patient was evaluated by PM&R and therapy for functional deficits, gait/ADL impairments and acute rehabilitation was recommended. Patient was medically optimized for discharge to [facility] on 4/17/24. (17 Apr 2024 13:16)    TDD: 5/4 home  ___________________________________________________________________________    SUBJECTIVE/ROS  Patient was seen and evaluated at bedside and therapy today.  Reported no overnight events and is in no acute distress.   BPs reportedly Fluctuate- No dizziness or Symptoms in therapy  last BM 4/24  Some back pain- took tylenol- will discuss with ortho need for TLSO on DC  Eager to participate on the recommended rehabilitation program.  Denies any CP, SOB, GONZALES, palpitations, fever, chills, body aches, cough, congestion, or any other symptoms at this time.   ___________________________________________________________________________  Vital Signs Last 24 Hrs  T(C): 36.6 (25 Apr 2024 07:45), Max: 37 (24 Apr 2024 20:48)  T(F): 97.9 (25 Apr 2024 07:45), Max: 98.6 (24 Apr 2024 20:48)  HR: 57 (25 Apr 2024 07:45) (57 - 64)  BP: 127/61 (25 Apr 2024 07:45) (122/63 - 127/61)  BP(mean): --  RR: 16 (25 Apr 2024 07:45) (16 - 16)  SpO2: 96% (25 Apr 2024 07:45) (96% - 96%)    Parameters below as of 25 Apr 2024 07:45  Patient On (Oxygen Delivery Method): room air    ___________________________________________________________________________  LABS                          12.2   7.21  )-----------( 336      ( 25 Apr 2024 05:32 )             36.2     04-25    138  |  101  |  33<H>  ----------------------------<  87  4.2   |  27  |  0.69    Ca    9.5      25 Apr 2024 05:32    TPro  6.5  /  Alb  2.8<L>  /  TBili  0.4  /  DBili  x   /  AST  52<H>  /  ALT  77<H>  /  AlkPhos  158<H>  04-25      ___________________________________________________________________________    MEDICATIONS  (STANDING):  atorvastatin 20 milliGRAM(s) Oral at bedtime  calcium carbonate   1250 mG (OsCal) 1 Tablet(s) Oral two times a day  cholecalciferol 1000 Unit(s) Oral daily  enoxaparin Injectable 40 milliGRAM(s) SubCutaneous every 24 hours  levothyroxine 50 MICROGram(s) Oral daily  lidocaine   4% Patch 1 Patch Transdermal <User Schedule>  lidocaine   4% Patch 1 Patch Transdermal <User Schedule>  polyethylene glycol 3350 17 Gram(s) Oral daily  senna 2 Tablet(s) Oral at bedtime    MEDICATIONS  (PRN):  acetaminophen     Tablet .. 650 milliGRAM(s) Oral every 12 hours PRN Mild Pain (1 - 3)  bisacodyl Suppository 10 milliGRAM(s) Rectal daily PRN Constipation    ___________________________________________________________________________    Gen - NAD, Comfortable  HEENT - NCAT, EOMI, MMM  Neck - Supple, No limited ROM  Pulm - CTAB, No wheeze, No rhonchi, No crackles  Cardiovascular - RRR, S1S2, + murmurs  Abdomen - Soft, NT/ND, +BS  Extremities - No clubbing, no cyanosis, no peripheral edema, no calf tenderness  Neuro-     Cognitive - Awake, Alert, Oriented  to self, place, date, year, situation. Able  to follow command     Communication - Fluent, Comprehensible     Attention: Intact      Memory: Recall 3 objects immediate and 3 min later         Cranial Nerves - CN 2-12 intact     Motor -                    LEFT    UE - 4+/5                    RIGHT UE - 4+/5                    LEFT    LE - 4/5                    RIGHT LE - 4/5        Sensory - Intact to LT     Reflexes - DTR brisk over patella Hasmukh, No primitive reflexes ? increased ext tone LEs     Coordination - FTN intact     Tone - normal  Psychiatric - Mood stable, Affect WNL  Skin:  all skin intact    ___________________________________________________________________________

## 2024-04-25 NOTE — PROGRESS NOTE ADULT - ATTENDING COMMENTS
Rehab Attending- Patient seen and examined by me - Case discussed, above note reviewed by me with modifications made    patient seen and evaluated in OT  Wearing TLSO- minimal thoracic pain- pain is lower lumbar  spoke with ortho- brace for comfort only - can Dc  BPs stable - no dizziness reported  labs reviewed by me- stable- cr .9, BUN 33- still encourage fluids  to continue intensive rehab program

## 2024-04-26 PROCEDURE — 99232 SBSQ HOSP IP/OBS MODERATE 35: CPT | Mod: GC

## 2024-04-26 PROCEDURE — 99232 SBSQ HOSP IP/OBS MODERATE 35: CPT

## 2024-04-26 RX ORDER — METHOCARBAMOL 500 MG/1
500 TABLET, FILM COATED ORAL THREE TIMES A DAY
Refills: 0 | Status: DISCONTINUED | OUTPATIENT
Start: 2024-04-26 | End: 2024-04-26

## 2024-04-26 RX ORDER — METHOCARBAMOL 500 MG/1
500 TABLET, FILM COATED ORAL THREE TIMES A DAY
Refills: 0 | Status: COMPLETED | OUTPATIENT
Start: 2024-04-26 | End: 2024-04-29

## 2024-04-26 RX ORDER — ALENDRONATE SODIUM 70 MG/1
70 TABLET ORAL
Refills: 0 | Status: DISCONTINUED | OUTPATIENT
Start: 2024-04-27 | End: 2024-04-27

## 2024-04-26 RX ORDER — ONDANSETRON 8 MG/1
4 TABLET, FILM COATED ORAL EVERY 8 HOURS
Refills: 0 | Status: DISCONTINUED | OUTPATIENT
Start: 2024-04-26 | End: 2024-05-04

## 2024-04-26 RX ADMIN — Medication 1000 UNIT(S): at 11:56

## 2024-04-26 RX ADMIN — LIDOCAINE 1 PATCH: 4 CREAM TOPICAL at 06:40

## 2024-04-26 RX ADMIN — Medication 1 TABLET(S): at 17:55

## 2024-04-26 RX ADMIN — LIDOCAINE 1 PATCH: 4 CREAM TOPICAL at 17:58

## 2024-04-26 RX ADMIN — LIDOCAINE 1 PATCH: 4 CREAM TOPICAL at 07:36

## 2024-04-26 RX ADMIN — METHOCARBAMOL 500 MILLIGRAM(S): 500 TABLET, FILM COATED ORAL at 21:27

## 2024-04-26 RX ADMIN — ATORVASTATIN CALCIUM 20 MILLIGRAM(S): 80 TABLET, FILM COATED ORAL at 21:27

## 2024-04-26 RX ADMIN — Medication 50 MICROGRAM(S): at 05:06

## 2024-04-26 RX ADMIN — Medication 1 TABLET(S): at 06:39

## 2024-04-26 RX ADMIN — ENOXAPARIN SODIUM 40 MILLIGRAM(S): 100 INJECTION SUBCUTANEOUS at 17:56

## 2024-04-26 RX ADMIN — LIDOCAINE 1 PATCH: 4 CREAM TOPICAL at 07:35

## 2024-04-26 NOTE — PROGRESS NOTE ADULT - SUBJECTIVE AND OBJECTIVE BOX
HPI:  Mrs. Nita Diaz is an 89-year-old female patient with PMH of HTN, Dyslipidemia, Hypothyroidism, and cervical fusion who presented to [facility] on 4/14 with back pain of one week from onset. The pain was progressive and located in the lower back, which occurred as she was moving cases of water at some time in preceding week, but doesn't remember a definitive sudden sensation of injury. She was seen by home service and given Tramadol at home once per day, which was helping, but still had difficulty completing ADLs because of the pain. Recent history additionally remarkable for a fall in October in which she landed on her left side. She denied any new urinary or bowel retention or incontinence. and has chronic constipation and urinary incontinence with no new worsening. Has never had a DEXA and has no previous history of fractures.    In the ED, VSS. CT lumbar spine with New mild T12 compression fracture without bony retropulsion since prior CT abdomen and pelvis 10/6/2023, correlate for point tenderness. Mild to moderate spinal canal stenosis or neural foraminal narrowing L2-3 through L4-5. Seen by orthopedics who recommended conservative management with TLSO brace and calcium/vit D supplementation.     Patient was evaluated by PM&R and therapy for functional deficits, gait/ADL impairments and acute rehabilitation was recommended. Patient was medically optimized for discharge to [facility] on 4/17/24. (17 Apr 2024 13:16)    TDD: 5/4 home  ___________________________________________________________________________    SUBJECTIVE/ROS  Patient was seen and evaluated at bedside and therapy today.  Reported no overnight events and is in no acute distress.   BPs reportedly Fluctuate- No dizziness or Symptoms in therapy. Has been fine yesterday.   Reports mild back pain. Currently wearing brace, which she states causes her discomfort. Discussed that she can wear it PRN for comfort.   Also discussed her compression fractures, osteoporosis, and starting alendronate once/weekly and following up with Dr. Mejia. The adverse reactions/effects and esophageal irritation if she does not sit up upright after administration. Patient is interested and in agreement.     Last BM 4/25. Voiding with no issues.   Denies any CP, SOB, GONZALES, palpitations, fever, chills, body aches, cough, congestion, or any other symptoms at this time.   ___________________________________________________________________________  Vital Signs Last 24 Hrs  T(C): 36.5 (26 Apr 2024 08:01), Max: 36.7 (25 Apr 2024 19:49)  T(F): 97.7 (26 Apr 2024 08:01), Max: 98 (25 Apr 2024 19:49)  HR: 59 (26 Apr 2024 08:01) (59 - 67)  BP: 113/71 (26 Apr 2024 08:01) (113/71 - 131/68)  BP(mean): --  RR: 17 (26 Apr 2024 08:01) (16 - 17)  SpO2: 100% (26 Apr 2024 08:01) (97% - 100%)    Parameters below as of 26 Apr 2024 08:01  Patient On (Oxygen Delivery Method): room air    ___________________________________________________________________________  LABS                          12.2   7.21  )-----------( 336      ( 25 Apr 2024 05:32 )             36.2     04-25    138  |  101  |  33<H>  ----------------------------<  87  4.2   |  27  |  0.69    Ca    9.5      25 Apr 2024 05:32    TPro  6.5  /  Alb  2.8<L>  /  TBili  0.4  /  DBili  x   /  AST  52<H>  /  ALT  77<H>  /  AlkPhos  158<H>  04-25      ___________________________________________________________________________    MEDICATIONS  (STANDING):  atorvastatin 20 milliGRAM(s) Oral at bedtime  calcium carbonate   1250 mG (OsCal) 1 Tablet(s) Oral two times a day  cholecalciferol 1000 Unit(s) Oral daily  enoxaparin Injectable 40 milliGRAM(s) SubCutaneous every 24 hours  levothyroxine 50 MICROGram(s) Oral daily  lidocaine   4% Patch 1 Patch Transdermal <User Schedule>  lidocaine   4% Patch 1 Patch Transdermal <User Schedule>  polyethylene glycol 3350 17 Gram(s) Oral daily  senna 2 Tablet(s) Oral at bedtime    MEDICATIONS  (PRN):  acetaminophen     Tablet .. 650 milliGRAM(s) Oral every 12 hours PRN Mild Pain (1 - 3)  bisacodyl Suppository 10 milliGRAM(s) Rectal daily PRN Constipation    ___________________________________________________________________________    Gen - NAD, Comfortable  HEENT - NCAT, EOMI, MMM  Neck - Supple, No limited ROM  Pulm - CTAB, No wheeze, No rhonchi, No crackles  Cardiovascular - RRR, S1S2, + murmurs  Abdomen - Soft, NT/ND, +BS  Extremities - No clubbing, no cyanosis, no peripheral edema, no calf tenderness  Neuro-     Cognitive - Awake, Alert, Oriented  to self, place, date, year, situation. Able  to follow command     Communication - Fluent, Comprehensible     Attention: Intact      Memory: Recall 3 objects immediate and 3 min later         Cranial Nerves - CN 2-12 intact     Motor -                    LEFT    UE - 4+/5                    RIGHT UE - 4+/5                    LEFT    LE - 4/5                    RIGHT LE - 4/5        Sensory - Intact to LT     Reflexes - DTR brisk over patella Hasmukh, No primitive reflexes ? increased ext tone LEs     Coordination - FTN intact     Tone - normal  Psychiatric - Mood stable, Affect WNL  Skin:  all skin intact    ___________________________________________________________________________

## 2024-04-26 NOTE — PROGRESS NOTE ADULT - ATTENDING COMMENTS
Rehab Attending- Patient seen and examined by me with resident- Case discussed, above note reviewed by me with modifications made    patient seen and evaluated today  TLSO off- no pain over T spine- pain right lumbar extensors- will add Robaxin 500mg 3x/day for 3 days  using Lidoderm as well  last BM 4/25  Balance /endurance improving  to continue intensive rehab program

## 2024-04-26 NOTE — PROGRESS NOTE ADULT - ASSESSMENT
88 YO female with PMH of HTN, Dyslipidemia, Hypothyroid, cervical fusion, parotid mass, pituitary adenoma s/p resection ( pathology benign)  who presented to [facility] on 4/14 with back pain x 1 week that has been progressive. CT lumbar spine with new mild T12 compression fracture without bony retropulsion. Seen by ortho who recommended conservative management with TLSO brace    #Compression Fracture  - T12 compression fracture without bony retropulsion, no surgical intervention.  - Fracture likely 2/2 osteoporosis  - Activity with TLSO    #Transaminitis, stable  - Trend LFTs   - Prior abd US WNL   - Limit Tylenol   - c/w statin for now     #Osteoporosis  - Calcium  BID  - Vit D3      #HTN  #orthostasis   - Repeat orthostatic vital signs today   - BP has been normotensive   - Pt has been off lisinopril 10mg daily d/t orthostatic hypotension   - Can add Midodrine 5 mg BID prior to therapy if orthostatic     #HLD  - Lipitor 20mg daily    #Hypothyroidism  - Synthroid 50mcg daily    DVT ppx- Lovenox

## 2024-04-26 NOTE — PROGRESS NOTE ADULT - SUBJECTIVE AND OBJECTIVE BOX
CC: Patient is a 89y old  Female who presents with a chief complaint of Traumatic thoracolumbar spine injuries and thoracic spine compression fracture (25 Apr 2024 12:24)    Interval History:  Patient seen and examined at bedside. No current complaints. States that she does have back pain when participating in PT. No overnight events per RN.     ALLERGIES:  No Known Allergies    MEDICATIONS  (STANDING):  atorvastatin 20 milliGRAM(s) Oral at bedtime  calcium carbonate   1250 mG (OsCal) 1 Tablet(s) Oral two times a day  cholecalciferol 1000 Unit(s) Oral daily  enoxaparin Injectable 40 milliGRAM(s) SubCutaneous every 24 hours  levothyroxine 50 MICROGram(s) Oral daily  lidocaine   4% Patch 1 Patch Transdermal <User Schedule>  lidocaine   4% Patch 1 Patch Transdermal <User Schedule>  polyethylene glycol 3350 17 Gram(s) Oral daily  senna 2 Tablet(s) Oral at bedtime    MEDICATIONS  (PRN):  acetaminophen     Tablet .. 650 milliGRAM(s) Oral every 12 hours PRN Mild Pain (1 - 3)  bisacodyl Suppository 10 milliGRAM(s) Rectal daily PRN Constipation    Vital Signs Last 24 Hrs  T(F): 97.7 (26 Apr 2024 08:01), Max: 98 (25 Apr 2024 19:49)  HR: 59 (26 Apr 2024 08:01) (59 - 67)  BP: 113/71 (26 Apr 2024 08:01) (113/71 - 131/68)  RR: 17 (26 Apr 2024 08:01) (16 - 17)  SpO2: 100% (26 Apr 2024 08:01) (97% - 100%)  I&O's Summary    Review of Systems:   Constitutional: negative for fatigue, negative for fever, negative for chills, negative for decreased appetite.  Skin: negative for rashes, negative for open wounds, negative for jaundice.   Eyes: negative for blurry vision, negative for double vision.   Ears, nose, throat: negative for ear pain, negative for nasal congestion, negative for sore throat, negative for lymph node swelling.   Cardiovascular: negative for chest pain, negative for palpitations, negative for lower extremity swelling.   Respiratory: negative for shortness of breath, negative for wheezing, negative for cough.   Gastrointestinal: negative for abdominal pain, negative for nausea, negative for vomiting, negative for diarrhea, negative for constipation, negative for blood in the stool, negative for black tarry stools.   Genitourinary: negative for burning on urination, negative for urinary urgency or frequency, negative for blood in the urine.   Endocrine: negative for cold intolerance, negative for heat intolerance, negative for increased thirst.   Hematologic: negative for easy bruising or bleeding.   Musculoskeletal: negative for muscle/joint pain, negative for decreased range of motion.   Neurological: negative for dizziness, negative for headaches, negative for loss of consciousness, negative for motor weakness, negative for sensory deficits.   Psychiatric: negative for depression, negative for anxiety.     PHYSICAL EXAM:  General: Awake and alert, cooperative with exam. No acute distress.   Skin: Warm, dry, and pink.   Eyes: Pupils equal and reactive to light. Extraocular eye movements intact. No conjunctival injection, discharge, or scleral icterus.   HEENT: Atraumatic, normocephalic. Moist mucus membranes.  Cardiology: Normal S1, S2. No murmurs, rubs, or gallops. Regular rate and rhythm.   Respiratory: Lungs clear to ascultation bilaterally. Good air exchange. No wheezes, rales, or rhonchi. Normal chest expansion.   Gastrointestinal: Positive bowel sounds. Soft, non-tender, non-distended. No guarding, rigidity, or rebound tenderness. No hepatosplenomegaly.   Extremities: No peripheral edema bilaterally. Dorsalis pedis pulses 2+ bilaterally.   Neurological: A+Ox3 (person, place, and time). Cranial nerves 2-12 intact. Normal speech. No facial droop. No focal neurological deficits.   Psychiatric: Normal affect. Normal mood.     LABS:                        12.2   7.21  )-----------( 336      ( 25 Apr 2024 05:32 )             36.2       04-25    138  |  101  |  33  ----------------------------<  87  4.2   |  27  |  0.69    Ca    9.5      25 Apr 2024 05:32    TPro  6.5  /  Alb  2.8  /  TBili  0.4  /  DBili  x   /  AST  52  /  ALT  77  /  AlkPhos  158  04-25    04-19 Chol 137 mg/dL LDL -- HDL 52 mg/dL Trig 68 mg/dL    Urinalysis Basic - ( 25 Apr 2024 05:32 )    Color: x / Appearance: x / SG: x / pH: x  Gluc: 87 mg/dL / Ketone: x  / Bili: x / Urobili: x   Blood: x / Protein: x / Nitrite: x   Leuk Esterase: x / RBC: x / WBC x   Sq Epi: x / Non Sq Epi: x / Bacteria: x    Care Discussed with Consultants/Other Providers: Yes

## 2024-04-26 NOTE — PROGRESS NOTE ADULT - ASSESSMENT
Mrs. Nita Diaz is an 89-year-old female patient with PMH of HTN, Dyslipidemia, Hypothyroidism, and cervical fusion who presented to [facility] on 4/14 with back pain of one week from onset. The pain was progressive and located in the lower back, which occurred as she was moving cases of water at some time in preceding week, but doesn't remember a definitive sudden sensation of injury. She was seen by home service and given Tramadol at home once per day, which was helping, but still had difficulty completing ADLs because of the pain. Recent history additionally remarkable for a fall in October in which she landed on her left side. She denied any new urinary or bowel retention or incontinence. and has chronic constipation and urinary incontinence with no new worsening. Has never had a DEXA and has no previous history of fractures.    In the ED, VSS. CT lumbar spine with New mild T12 compression fracture without bony retropulsion since prior CT abdomen and pelvis 10/6/2023, correlate for point tenderness. Mild to moderate spinal canal stenosis or neural foraminal narrowing L2-3 through L4-5. Seen by orthopedics who recommended conservative management with TLSO brace and calcium/vit D supplementation.   Patient was evaluated by PM&R and therapy for functional deficits, gait/ADL impairments and acute rehabilitation was recommended. Patient was medically optimized for discharge to [facility] on 4/17/24.      #Traumatic thoracolumbar spine injuries and thoracic spine compression fracture  - S/P Falls  - T12 compression fracture without bony retropulsion      * TLSO when OOB  - Mild to moderate lumbar spinal canal stenosis, neural foraminal narrowing L2-3 through L4-5  - Impaired ADLs and mobility  - Need for assistance with personal care  - Comprehensive Rehab Program: PT/OT, 3hours daily and 5 days weekly      * PT: Focused on improving strength, endurance, coordination, balance, functional mobility, and transfers      * OT: Focused on improving strength, fine motor skills, coordination, posture and ADLs.      #orthostatic Hypotension  - teds/SCDs/abd binder  - OH negative, so not starting midodrine.     #KEYANA   - BUN/Cr 65/1.67 on 4/19  - IVF   - improved.       #transaminitis  - monitor  - avoid hepatoxic agents  - elevated but stable, f/u Monday 4/29     #Osteoporosis  - calcium  BID  - Vit D3  increased to 1000iu daily.   - discussed possibility of starting bisphosphonates, patient now in agreement.   - alendronate 70mg weekly starting. Patient to be seated upright for atleast 30minutes afterwards.   - f/u Dr. Mejia and PCP    #HTN  - Lisinopril 10mg daily    #HLD  - Lipitor 20mg daily    #Hypothyroidism  - Synthroid 50mcg daily    #Pain management  - Tylenol PRN decreased   - lidocaine patch b/l low back    #DVT ppx  - Lovenox, SCD, TEDs    #Bowel Regimen  - Senna, miralax daily    #Bladder management  - BS on admission, and q 8 hours (SC if > 400)  - Monitor UO    #FEN   - Diet: DASH/TLC    #Skin:  - Skin on admission: Intact    #Precaution  - Fall, Aspiration, Spinal    #GOC  CODE STATUS: FULL CODE     Outpatient Follow-up (Specialty/Name of physician):    Tuyet Montgomery  Family Medicine  [address] [place] [id number]  Phone: [phone]  Fax: [phone]  Follow Up Time: 1 week    John Milton  Orthopaedic Surgery  [address], [address]  [place] [address]  Phone: [phone]  Fax: [phone]  Follow Up Time: 1 week    Rudy Ross  Otolaryngology  [address] [place] [address]  Phone: [phone]  Fax: [phone]  Follow Up Time:     Dat Langford  Cardiology  [address] [facility], [address]  [place] [address]  Phone: [phone]  Fax: [phone]  Follow Up Time: 2 weeks     Mrs. Nita Diaz is an 89-year-old female patient with PMH of HTN, Dyslipidemia, Hypothyroidism, and cervical fusion who presented to [facility] on 4/14 with back pain of one week from onset. The pain was progressive and located in the lower back, which occurred as she was moving cases of water at some time in preceding week, but doesn't remember a definitive sudden sensation of injury. She was seen by home service and given Tramadol at home once per day, which was helping, but still had difficulty completing ADLs because of the pain. Recent history additionally remarkable for a fall in October in which she landed on her left side. She denied any new urinary or bowel retention or incontinence. and has chronic constipation and urinary incontinence with no new worsening. Has never had a DEXA and has no previous history of fractures.    In the ED, VSS. CT lumbar spine with New mild T12 compression fracture without bony retropulsion since prior CT abdomen and pelvis 10/6/2023, correlate for point tenderness. Mild to moderate spinal canal stenosis or neural foraminal narrowing L2-3 through L4-5. Seen by orthopedics who recommended conservative management with TLSO brace and calcium/vit D supplementation.   Patient was evaluated by PM&R and therapy for functional deficits, gait/ADL impairments and acute rehabilitation was recommended. Patient was medically optimized for discharge to [facility] IR[facility] on 4/17/24.      #Traumatic thoracolumbar spine injuries and thoracic spine compression fracture  - S/P Falls  - T12 compression fracture without bony retropulsion      * TLSO when OOB  - Mild to moderate lumbar spinal canal stenosis, neural foraminal narrowing L2-3 through L4-5  - Impaired ADLs and mobility  - Need for assistance with personal care  - Comprehensive Rehab Program: PT/OT, 3hours daily and 5 days weekly      * PT: Focused on improving strength, endurance, coordination, balance, functional mobility, and transfers      * OT: Focused on improving strength, fine motor skills, coordination, posture and ADLs.      #orthostatic Hypotension  - teds/SCDs/abd binder  - OH negative, so not starting midodrine.     #KEYANA   - BUN/Cr 65/1.67 on 4/19  - IVF   - improved.       #transaminitis  - monitor  - avoid hepatoxic agents  - elevated but stable, f/u Monday 4/29     #Osteoporosis  - calcium  BID  - Vit D3  increased to 1000iu daily.   - discussed possibility of starting bisphosphonates, patient now in agreement.   - alendronate 70mg weekly starting 4/27. Patient to be seated upright for atleast 30minutes afterwards.   - f/u Dr. Mejia and PCP    #HTN  - Lisinopril 10mg daily    #HLD  - Lipitor 20mg daily    #Hypothyroidism  - Synthroid 50mcg daily    #Pain management  - Tylenol PRN decreased   - lidocaine patch b/l low back    #DVT ppx  - Lovenox, SCD, TEDs    #Bowel Regimen  - Senna, miralax daily    #Bladder management  - BS on admission, and q 8 hours (SC if > 400)  - Monitor UO    #FEN   - Diet: DASH/TLC    #Skin:  - Skin on admission: Intact    #Precaution  - Fall, Aspiration, Spinal    #GOC  CODE STATUS: FULL CODE     Outpatient Follow-up (Specialty/Name of physician):    Tuyet Montgomery  Family Medicine  [address] [place] [address]  Phone: [phone]  Fax: [phone]  Follow Up Time: 1 week    John Milton  Orthopaedic Surgery  [address] [facility] [address], [address]  [place] [address]  Phone: [phone]  Fax: [phone]  Follow Up Time: 1 week    Rudy Ross  Otolaryngology  [address] [place] [address]  Phone: [phone]  Fax: [phone]  Follow Up Time:     Dat Langford  Cardiology  [address] [facility], [address]  [place] [address]  Phone: [phone]  Fax: [phone]  Follow Up Time: 2 weeks

## 2024-04-27 PROCEDURE — 99232 SBSQ HOSP IP/OBS MODERATE 35: CPT

## 2024-04-27 RX ORDER — ALENDRONATE SODIUM 70 MG/1
70 TABLET ORAL
Refills: 0 | Status: DISCONTINUED | OUTPATIENT
Start: 2024-04-28 | End: 2024-05-04

## 2024-04-27 RX ADMIN — SENNA PLUS 2 TABLET(S): 8.6 TABLET ORAL at 21:46

## 2024-04-27 RX ADMIN — LIDOCAINE 1 PATCH: 4 CREAM TOPICAL at 06:08

## 2024-04-27 RX ADMIN — METHOCARBAMOL 500 MILLIGRAM(S): 500 TABLET, FILM COATED ORAL at 06:05

## 2024-04-27 RX ADMIN — Medication 1 TABLET(S): at 17:36

## 2024-04-27 RX ADMIN — METHOCARBAMOL 500 MILLIGRAM(S): 500 TABLET, FILM COATED ORAL at 13:42

## 2024-04-27 RX ADMIN — LIDOCAINE 1 PATCH: 4 CREAM TOPICAL at 10:01

## 2024-04-27 RX ADMIN — Medication 1000 UNIT(S): at 11:44

## 2024-04-27 RX ADMIN — ATORVASTATIN CALCIUM 20 MILLIGRAM(S): 80 TABLET, FILM COATED ORAL at 21:46

## 2024-04-27 RX ADMIN — ENOXAPARIN SODIUM 40 MILLIGRAM(S): 100 INJECTION SUBCUTANEOUS at 17:37

## 2024-04-27 RX ADMIN — METHOCARBAMOL 500 MILLIGRAM(S): 500 TABLET, FILM COATED ORAL at 21:46

## 2024-04-27 RX ADMIN — Medication 1 TABLET(S): at 06:05

## 2024-04-27 RX ADMIN — LIDOCAINE 1 PATCH: 4 CREAM TOPICAL at 17:45

## 2024-04-27 RX ADMIN — Medication 50 MICROGRAM(S): at 05:08

## 2024-04-27 NOTE — PROGRESS NOTE ADULT - SUBJECTIVE AND OBJECTIVE BOX
Patient is a 89y old  Female who presents with a chief complaint of Traumatic thoracolumbar spine injuries and thoracic spine compression fracture (27 Apr 2024 11:40)      Subjective and overnight events:  Patient seen and examined at bedside. reports some left upper arm pain, pt felt she may have slept on the arm last night. no other complaints. no fever, chills, lightheadedness. no cough, sob, cp, abd pain.    ALLERGIES:  No Known Allergies    MEDICATIONS  (STANDING):  atorvastatin 20 milliGRAM(s) Oral at bedtime  calcium carbonate   1250 mG (OsCal) 1 Tablet(s) Oral two times a day  cholecalciferol 1000 Unit(s) Oral daily  enoxaparin Injectable 40 milliGRAM(s) SubCutaneous every 24 hours  levothyroxine 50 MICROGram(s) Oral daily  lidocaine   4% Patch 1 Patch Transdermal <User Schedule>  lidocaine   4% Patch 1 Patch Transdermal <User Schedule>  methocarbamol 500 milliGRAM(s) Oral three times a day  polyethylene glycol 3350 17 Gram(s) Oral daily  senna 2 Tablet(s) Oral at bedtime    MEDICATIONS  (PRN):  acetaminophen     Tablet .. 650 milliGRAM(s) Oral every 12 hours PRN Mild Pain (1 - 3)  bisacodyl Suppository 10 milliGRAM(s) Rectal daily PRN Constipation  ondansetron   Disintegrating Tablet 4 milliGRAM(s) Oral every 8 hours PRN Nausea and/or Vomiting    Vital Signs Last 24 Hrs  T(F): 98.1 (27 Apr 2024 09:10), Max: 98.1 (27 Apr 2024 09:10)  HR: 63 (27 Apr 2024 09:10) (61 - 63)  BP: 151/70 (27 Apr 2024 09:10) (120/75 - 151/70)  RR: 17 (27 Apr 2024 09:10) (16 - 17)  SpO2: 97% (27 Apr 2024 09:10) (97% - 98%)  I&O's Summary    PHYSICAL EXAM:  General: NAD, Awake alert, answering questions   ENT: MMM  Neck: Supple, No JVD  Lungs: Clear to auscultation bilaterally  Cardio: RRR, S1/S2, No murmurs  Abdomen: Soft, Nontender, Nondistended; Bowel sounds present  Extremities: No calf tenderness, No pitting edema    LABS:                        12.2   7.21  )-----------( 336      ( 25 Apr 2024 05:32 )             36.2     04-25    138  |  101  |  33  ----------------------------<  87  4.2   |  27  |  0.69    Ca    9.5      25 Apr 2024 05:32    TPro  6.5  /  Alb  2.8  /  TBili  0.4  /  DBili  x   /  AST  52  /  ALT  77  /  AlkPhos  158  04-25 04-19 Chol 137 mg/dL LDL -- HDL 52 mg/dL Trig 68 mg/dL          Urinalysis Basic - ( 25 Apr 2024 05:32 )    Color: x / Appearance: x / SG: x / pH: x  Gluc: 87 mg/dL / Ketone: x  / Bili: x / Urobili: x   Blood: x / Protein: x / Nitrite: x   Leuk Esterase: x / RBC: x / WBC x   Sq Epi: x / Non Sq Epi: x / Bacteria: x          RADIOLOGY & ADDITIONAL TESTS:    Care Discussed with Consultants/Other Providers:

## 2024-04-27 NOTE — CHART NOTE - NSCHARTNOTEFT_GEN_A_CORE
Notified by RN pt took AM medications prior to receiving fosamax.   Discussed with pharmacist. Fosamax either to be given 4 hours post meal today, but best option is to give fosamax tomorrow prior to all medications.  Discussed with RN, med to be re-ordered for tomorrow AM.

## 2024-04-27 NOTE — PROGRESS NOTE ADULT - ASSESSMENT
88 YO female with PMH of HTN, Dyslipidemia, Hypothyroid, cervical fusion, parotid mass, pituitary adenoma s/p resection ( pathology benign)  who presented to [facility] on 4/14 with back pain x 1 week that has been progressive. CT lumbar spine with new mild T12 compression fracture without bony retropulsion. Seen by ortho who recommended conservative management with TLSO brace    #Compression Fracture  - T12 compression fracture without bony retropulsion, no surgical intervention.  - Fracture likely 2/2 osteoporosis  - Activity with TLSO    #Transaminitis  - LFT stable  - Prior abd US WNL   - Limit Tylenol   - c/w statin for now     #Osteoporosis  - Calcium  BID  - Fosamax 70mg qSunday  - Vit D3      #HTN  #orthostasis   - check orthostatic vital signs  - BP has been normotensive   - Pt has been off lisinopril 10mg daily d/t orthostatic hypotension     #HLD  - Lipitor 20mg daily    #Hypothyroidism  - Synthroid 50mcg daily    DVT ppx- Lovenox

## 2024-04-27 NOTE — PROGRESS NOTE ADULT - SUBJECTIVE AND OBJECTIVE BOX
Cc: Gait dysfunction    HPI: Patient with no new medical issues today.  Pain controlled, no chest pain, no N/V, no Fevers/Chills. No other new ROS  Has been tolerating rehabilitation program.    acetaminophen     Tablet .. 650 milliGRAM(s) Oral every 12 hours PRN  atorvastatin 20 milliGRAM(s) Oral at bedtime  bisacodyl Suppository 10 milliGRAM(s) Rectal daily PRN  calcium carbonate   1250 mG (OsCal) 1 Tablet(s) Oral two times a day  cholecalciferol 1000 Unit(s) Oral daily  enoxaparin Injectable 40 milliGRAM(s) SubCutaneous every 24 hours  levothyroxine 50 MICROGram(s) Oral daily  lidocaine   4% Patch 1 Patch Transdermal <User Schedule>  lidocaine   4% Patch 1 Patch Transdermal <User Schedule>  methocarbamol 500 milliGRAM(s) Oral three times a day  ondansetron   Disintegrating Tablet 4 milliGRAM(s) Oral every 8 hours PRN  polyethylene glycol 3350 17 Gram(s) Oral daily  senna 2 Tablet(s) Oral at bedtime      T(C): 36.7 (04-27-24 @ 09:10), Max: 36.7 (04-26-24 @ 20:13)  HR: 63 (04-27-24 @ 09:10) (61 - 63)  BP: 151/70 (04-27-24 @ 09:10) (120/75 - 151/70)  RR: 17 (04-27-24 @ 09:10) (16 - 17)  SpO2: 97% (04-27-24 @ 09:10) (97% - 98%)    In NAD  HEENT- EOMI  Heart- No cyanosis   Lungs- No respiratory distress   Abd- + BS, NT  Ext- No calf pain  Neuro- Exam unchanged      Imp: Patient with diagnosis of Traumatic thoracolumbar spine injuries and thoracic spine compression fracture admitted for comprehensive acute rehabilitation.    Plan:  -Impaired mobility - Continue PT/OT  - DVT prophylaxis - Lovenox   - Skin- Turn q2h, check skin daily  - Continue current medications; patient medically stable.   -Active issues-   -HLD-  atorvastatin  -Muscle spasms-  methocarbamol  -Hypothyroid - levothyroxine   - Patient is stable to continue current rehabilitation program.

## 2024-04-28 PROCEDURE — 99232 SBSQ HOSP IP/OBS MODERATE 35: CPT

## 2024-04-28 RX ORDER — DICLOFENAC SODIUM 30 MG/G
2 GEL TOPICAL
Refills: 0 | Status: DISCONTINUED | OUTPATIENT
Start: 2024-04-28 | End: 2024-05-04

## 2024-04-28 RX ADMIN — METHOCARBAMOL 500 MILLIGRAM(S): 500 TABLET, FILM COATED ORAL at 22:07

## 2024-04-28 RX ADMIN — DICLOFENAC SODIUM 2 GRAM(S): 30 GEL TOPICAL at 18:43

## 2024-04-28 RX ADMIN — ALENDRONATE SODIUM 70 MILLIGRAM(S): 70 TABLET ORAL at 05:56

## 2024-04-28 RX ADMIN — ATORVASTATIN CALCIUM 20 MILLIGRAM(S): 80 TABLET, FILM COATED ORAL at 22:06

## 2024-04-28 RX ADMIN — Medication 1000 UNIT(S): at 12:32

## 2024-04-28 RX ADMIN — Medication 50 MICROGRAM(S): at 06:39

## 2024-04-28 RX ADMIN — Medication 650 MILLIGRAM(S): at 13:42

## 2024-04-28 RX ADMIN — SENNA PLUS 2 TABLET(S): 8.6 TABLET ORAL at 22:06

## 2024-04-28 RX ADMIN — ENOXAPARIN SODIUM 40 MILLIGRAM(S): 100 INJECTION SUBCUTANEOUS at 17:19

## 2024-04-28 RX ADMIN — Medication 650 MILLIGRAM(S): at 12:32

## 2024-04-28 RX ADMIN — METHOCARBAMOL 500 MILLIGRAM(S): 500 TABLET, FILM COATED ORAL at 13:25

## 2024-04-28 RX ADMIN — METHOCARBAMOL 500 MILLIGRAM(S): 500 TABLET, FILM COATED ORAL at 06:39

## 2024-04-28 RX ADMIN — Medication 1 TABLET(S): at 17:18

## 2024-04-28 RX ADMIN — Medication 1 TABLET(S): at 06:39

## 2024-04-28 NOTE — PROGRESS NOTE ADULT - SUBJECTIVE AND OBJECTIVE BOX
Patient is a 89y old  Female who presents with a chief complaint of Traumatic thoracolumbar spine injuries and thoracic spine compression fracture (28 Apr 2024 09:54)      Subjective and overnight events:  Patient seen and examined at bedside. + left arm pain that started two days ago and pt reports that she has trouble lifting her arm due to pain. no falls/trauma. no fever, chills, sob, cp, abd pain.     ALLERGIES:  No Known Allergies    MEDICATIONS  (STANDING):  alendronate 70 milliGRAM(s) Oral <User Schedule>  atorvastatin 20 milliGRAM(s) Oral at bedtime  calcium carbonate   1250 mG (OsCal) 1 Tablet(s) Oral two times a day  cholecalciferol 1000 Unit(s) Oral daily  diclofenac sodium 1% Gel 2 Gram(s) Topical four times a day  enoxaparin Injectable 40 milliGRAM(s) SubCutaneous every 24 hours  levothyroxine 50 MICROGram(s) Oral daily  lidocaine   4% Patch 1 Patch Transdermal <User Schedule>  lidocaine   4% Patch 1 Patch Transdermal <User Schedule>  methocarbamol 500 milliGRAM(s) Oral three times a day  polyethylene glycol 3350 17 Gram(s) Oral daily  senna 2 Tablet(s) Oral at bedtime    MEDICATIONS  (PRN):  acetaminophen     Tablet .. 650 milliGRAM(s) Oral every 12 hours PRN Mild Pain (1 - 3)  bisacodyl Suppository 10 milliGRAM(s) Rectal daily PRN Constipation  ondansetron   Disintegrating Tablet 4 milliGRAM(s) Oral every 8 hours PRN Nausea and/or Vomiting    Vital Signs Last 24 Hrs  T(F): 98.4 (28 Apr 2024 08:59), Max: 98.9 (27 Apr 2024 21:44)  HR: 69 (28 Apr 2024 08:59) (65 - 69)  BP: 119/66 (28 Apr 2024 08:59) (119/66 - 129/72)  RR: 17 (28 Apr 2024 08:59) (16 - 17)  SpO2: 95% (28 Apr 2024 08:59) (95% - 96%)  I&O's Summary    PHYSICAL EXAM:  General: NAD, aaox3  ENT: MMM  Neck: Supple, No JVD  Lungs: Clear to auscultation bilaterally  Cardio: RRR, S1/S2, No murmurs  Abdomen: Soft, Nontender, Nondistended; Bowel sounds present  Extremities: No calf tenderness, No pitting edema. Left shoulder and left upper arm tenderness on palpation. pain on passive movement. pt unable to lift her arm due to pain.    LABS:                    04-19 Chol 137 mg/dL LDL -- HDL 52 mg/dL Trig 68 mg/dL                          RADIOLOGY & ADDITIONAL TESTS:    Care Discussed with Consultants/Other Providers:

## 2024-04-28 NOTE — PROGRESS NOTE ADULT - ASSESSMENT
89 YO female with PMH of HTN, Dyslipidemia, Hypothyroid, cervical fusion, parotid mass, pituitary adenoma s/p resection (pathology benign)  who presented to [facility] on 4/14 with back pain x 1 week that has been progressive. CT lumbar spine with new mild T12 compression fracture without bony retropulsion. Seen by ortho who recommended conservative management with TLSO brace    #Compression Fracture  - T12 compression fracture without bony retropulsion, no surgical intervention.  - Fracture likely 2/2 osteoporosis  - Activity with TLSO    #LUE pain  - pt unable to extend/ lift up her arm due to pain  - pain started two days. no fall or trauma that pt recalls  - pt declined Xray or other imaging test at this time and would like pain control management for now  - voltaren gel added per rehab team     #Transaminitis  - LFT stable  - Prior abd US WNL   - Limit Tylenol   - c/w statin for now     #Osteoporosis  - Calcium  BID  - Fosamax 70mg qSunday  - Vit D3      #HTN  #orthostasis   - check orthostatic vital signs  - BP has been normotensive   - Pt has been off lisinopril 10mg daily d/t orthostatic hypotension     #HLD  - Lipitor 20mg daily    #Hypothyroidism  - Synthroid 50mcg daily    DVT ppx- Lovenox

## 2024-04-28 NOTE — PROGRESS NOTE ADULT - SUBJECTIVE AND OBJECTIVE BOX
Cc: Gait dysfunction    HPI: Patient doing well. Reports pain in left shoulder with movement.  No erythema.  No trauma.  No numbness.    Has been tolerating rehabilitation program.    acetaminophen     Tablet .. 650 milliGRAM(s) Oral every 12 hours PRN  alendronate 70 milliGRAM(s) Oral <User Schedule>  atorvastatin 20 milliGRAM(s) Oral at bedtime  bisacodyl Suppository 10 milliGRAM(s) Rectal daily PRN  calcium carbonate   1250 mG (OsCal) 1 Tablet(s) Oral two times a day  cholecalciferol 1000 Unit(s) Oral daily  enoxaparin Injectable 40 milliGRAM(s) SubCutaneous every 24 hours  levothyroxine 50 MICROGram(s) Oral daily  lidocaine   4% Patch 1 Patch Transdermal <User Schedule>  lidocaine   4% Patch 1 Patch Transdermal <User Schedule>  methocarbamol 500 milliGRAM(s) Oral three times a day  ondansetron   Disintegrating Tablet 4 milliGRAM(s) Oral every 8 hours PRN  polyethylene glycol 3350 17 Gram(s) Oral daily  senna 2 Tablet(s) Oral at bedtime      T(C): 36.9 (04-28-24 @ 08:59), Max: 37.2 (04-27-24 @ 21:44)  HR: 69 (04-28-24 @ 08:59) (65 - 69)  BP: 119/66 (04-28-24 @ 08:59) (119/66 - 129/72)  RR: 17 (04-28-24 @ 08:59) (16 - 17)  SpO2: 95% (04-28-24 @ 08:59) (95% - 96%)    In NAD  HEENT- EOMI  Heart- No cyanosis   Lungs- No respiratory distress   Abd- + BS, NT  Ext- No calf pain, no erythema or increased warmth in LUE.  TTP left anterior shoulder.  Sensation and strength intact.  Painful arc., -Spurling, -hoffmans sign    Neuro- Exam unchanged          Imp: Patient with diagnosis of Traumatic thoracolumbar spine injuries and thoracic spine compression fracture admitted for comprehensive acute rehabilitation.    Plan:  -Impaired mobility - Continue PT/OT  - DVT prophylaxis - Lovenox   - Skin- Turn q2h, check skin daily  - Continue current medications; patient medically stable.   -Active issues-   -HLD-  atorvastatin  -Muscle spasms-  methocarbamol  -Hypothyroid - levothyroxine   -Left shoulder pain -  suspect rotator cuff syndrome, discussed x-ray imaging with patient, she would like to defer.  Start voltaren gel, if no improvement reconsider imaging, monitor   - Patient is stable to continue current rehabilitation program.

## 2024-04-29 LAB
ALBUMIN SERPL ELPH-MCNC: 2.7 G/DL — LOW (ref 3.3–5)
ALP SERPL-CCNC: 131 U/L — HIGH (ref 40–120)
ALT FLD-CCNC: 53 U/L — HIGH (ref 10–45)
ANION GAP SERPL CALC-SCNC: 8 MMOL/L — SIGNIFICANT CHANGE UP (ref 5–17)
AST SERPL-CCNC: 24 U/L — SIGNIFICANT CHANGE UP (ref 10–40)
BASOPHILS # BLD AUTO: 0.04 K/UL — SIGNIFICANT CHANGE UP (ref 0–0.2)
BASOPHILS NFR BLD AUTO: 0.5 % — SIGNIFICANT CHANGE UP (ref 0–2)
BILIRUB SERPL-MCNC: 0.6 MG/DL — SIGNIFICANT CHANGE UP (ref 0.2–1.2)
BUN SERPL-MCNC: 17 MG/DL — SIGNIFICANT CHANGE UP (ref 7–23)
CALCIUM SERPL-MCNC: 9.5 MG/DL — SIGNIFICANT CHANGE UP (ref 8.4–10.5)
CHLORIDE SERPL-SCNC: 101 MMOL/L — SIGNIFICANT CHANGE UP (ref 96–108)
CO2 SERPL-SCNC: 28 MMOL/L — SIGNIFICANT CHANGE UP (ref 22–31)
CREAT SERPL-MCNC: 0.52 MG/DL — SIGNIFICANT CHANGE UP (ref 0.5–1.3)
EGFR: 89 ML/MIN/1.73M2 — SIGNIFICANT CHANGE UP
EOSINOPHIL # BLD AUTO: 0.04 K/UL — SIGNIFICANT CHANGE UP (ref 0–0.5)
EOSINOPHIL NFR BLD AUTO: 0.5 % — SIGNIFICANT CHANGE UP (ref 0–6)
GLUCOSE SERPL-MCNC: 92 MG/DL — SIGNIFICANT CHANGE UP (ref 70–99)
HCT VFR BLD CALC: 36.3 % — SIGNIFICANT CHANGE UP (ref 34.5–45)
HGB BLD-MCNC: 12.2 G/DL — SIGNIFICANT CHANGE UP (ref 11.5–15.5)
IMM GRANULOCYTES NFR BLD AUTO: 0.4 % — SIGNIFICANT CHANGE UP (ref 0–0.9)
LYMPHOCYTES # BLD AUTO: 1.5 K/UL — SIGNIFICANT CHANGE UP (ref 1–3.3)
LYMPHOCYTES # BLD AUTO: 18.6 % — SIGNIFICANT CHANGE UP (ref 13–44)
MCHC RBC-ENTMCNC: 30.7 PG — SIGNIFICANT CHANGE UP (ref 27–34)
MCHC RBC-ENTMCNC: 33.6 GM/DL — SIGNIFICANT CHANGE UP (ref 32–36)
MCV RBC AUTO: 91.4 FL — SIGNIFICANT CHANGE UP (ref 80–100)
MONOCYTES # BLD AUTO: 0.84 K/UL — SIGNIFICANT CHANGE UP (ref 0–0.9)
MONOCYTES NFR BLD AUTO: 10.4 % — SIGNIFICANT CHANGE UP (ref 2–14)
NEUTROPHILS # BLD AUTO: 5.63 K/UL — SIGNIFICANT CHANGE UP (ref 1.8–7.4)
NEUTROPHILS NFR BLD AUTO: 69.6 % — SIGNIFICANT CHANGE UP (ref 43–77)
NRBC # BLD: 0 /100 WBCS — SIGNIFICANT CHANGE UP (ref 0–0)
PLATELET # BLD AUTO: 327 K/UL — SIGNIFICANT CHANGE UP (ref 150–400)
POTASSIUM SERPL-MCNC: 4.2 MMOL/L — SIGNIFICANT CHANGE UP (ref 3.5–5.3)
POTASSIUM SERPL-SCNC: 4.2 MMOL/L — SIGNIFICANT CHANGE UP (ref 3.5–5.3)
PROT SERPL-MCNC: 6.7 G/DL — SIGNIFICANT CHANGE UP (ref 6–8.3)
RBC # BLD: 3.97 M/UL — SIGNIFICANT CHANGE UP (ref 3.8–5.2)
RBC # FLD: 12.7 % — SIGNIFICANT CHANGE UP (ref 10.3–14.5)
SODIUM SERPL-SCNC: 137 MMOL/L — SIGNIFICANT CHANGE UP (ref 135–145)
WBC # BLD: 8.08 K/UL — SIGNIFICANT CHANGE UP (ref 3.8–10.5)
WBC # FLD AUTO: 8.08 K/UL — SIGNIFICANT CHANGE UP (ref 3.8–10.5)

## 2024-04-29 PROCEDURE — 99232 SBSQ HOSP IP/OBS MODERATE 35: CPT

## 2024-04-29 PROCEDURE — 73060 X-RAY EXAM OF HUMERUS: CPT | Mod: 26,LT

## 2024-04-29 PROCEDURE — 99232 SBSQ HOSP IP/OBS MODERATE 35: CPT | Mod: GC

## 2024-04-29 PROCEDURE — 73030 X-RAY EXAM OF SHOULDER: CPT | Mod: 26,LT

## 2024-04-29 RX ADMIN — DICLOFENAC SODIUM 2 GRAM(S): 30 GEL TOPICAL at 05:19

## 2024-04-29 RX ADMIN — LIDOCAINE 1 PATCH: 4 CREAM TOPICAL at 09:21

## 2024-04-29 RX ADMIN — ATORVASTATIN CALCIUM 20 MILLIGRAM(S): 80 TABLET, FILM COATED ORAL at 21:52

## 2024-04-29 RX ADMIN — Medication 1 TABLET(S): at 05:18

## 2024-04-29 RX ADMIN — Medication 1 TABLET(S): at 18:01

## 2024-04-29 RX ADMIN — Medication 50 MICROGRAM(S): at 05:19

## 2024-04-29 RX ADMIN — LIDOCAINE 1 PATCH: 4 CREAM TOPICAL at 18:03

## 2024-04-29 RX ADMIN — Medication 1000 UNIT(S): at 11:07

## 2024-04-29 RX ADMIN — DICLOFENAC SODIUM 2 GRAM(S): 30 GEL TOPICAL at 18:01

## 2024-04-29 RX ADMIN — ENOXAPARIN SODIUM 40 MILLIGRAM(S): 100 INJECTION SUBCUTANEOUS at 18:01

## 2024-04-29 RX ADMIN — METHOCARBAMOL 500 MILLIGRAM(S): 500 TABLET, FILM COATED ORAL at 05:18

## 2024-04-29 RX ADMIN — LIDOCAINE 1 PATCH: 4 CREAM TOPICAL at 05:25

## 2024-04-29 RX ADMIN — DICLOFENAC SODIUM 2 GRAM(S): 30 GEL TOPICAL at 21:52

## 2024-04-29 NOTE — PROGRESS NOTE ADULT - SUBJECTIVE AND OBJECTIVE BOX
Patient is a 89y old  Female who presents with a chief complaint of Traumatic thoracolumbar spine injuries and thoracic spine compression fracture (28 Apr 2024 15:13)      Subjective and overnight events:  Patient seen and examined at bedside. reports persistent left shoulder pain radiating down the arm and unable to list the left arm due to pain.  no fever, chills, sob, cp, abd pain.    ALLERGIES:  No Known Allergies    MEDICATIONS  (STANDING):  alendronate 70 milliGRAM(s) Oral <User Schedule>  atorvastatin 20 milliGRAM(s) Oral at bedtime  calcium carbonate   1250 mG (OsCal) 1 Tablet(s) Oral two times a day  cholecalciferol 1000 Unit(s) Oral daily  diclofenac sodium 1% Gel 2 Gram(s) Topical four times a day  enoxaparin Injectable 40 milliGRAM(s) SubCutaneous every 24 hours  levothyroxine 50 MICROGram(s) Oral daily  lidocaine   4% Patch 1 Patch Transdermal <User Schedule>  lidocaine   4% Patch 1 Patch Transdermal <User Schedule>  methocarbamol 500 milliGRAM(s) Oral three times a day  polyethylene glycol 3350 17 Gram(s) Oral daily  senna 2 Tablet(s) Oral at bedtime    MEDICATIONS  (PRN):  acetaminophen     Tablet .. 650 milliGRAM(s) Oral every 12 hours PRN Mild Pain (1 - 3)  bisacodyl Suppository 10 milliGRAM(s) Rectal daily PRN Constipation  ondansetron   Disintegrating Tablet 4 milliGRAM(s) Oral every 8 hours PRN Nausea and/or Vomiting    Vital Signs Last 24 Hrs  T(F): 98.1 (29 Apr 2024 07:57), Max: 98.1 (29 Apr 2024 07:57)  HR: 72 (29 Apr 2024 07:57) (70 - 72)  BP: 138/81 (29 Apr 2024 07:57) (120/68 - 138/81)  RR: 17 (29 Apr 2024 07:57) (17 - 18)  SpO2: 95% (29 Apr 2024 07:57) (95% - 98%)  I&O's Summary    PHYSICAL EXAM:  General: NAD, awake, alert, answering questions  ENT: MMM  Neck: Supple, No JVD  Lungs: Clear to auscultation bilaterally  Cardio: RRR, S1/S2, No murmurs  Abdomen: Soft, Nontender, Nondistended; Bowel sounds present  Extremities: No calf tenderness, No pitting edema. left shoulder tender to palpation. ROM very limited due to pain    LABS:                        12.2   8.08  )-----------( 327      ( 29 Apr 2024 06:12 )             36.3     04-29    137  |  101  |  17  ----------------------------<  92  4.2   |  28  |  0.52    Ca    9.5      29 Apr 2024 06:12    TPro  6.7  /  Alb  2.7  /  TBili  0.6  /  DBili  x   /  AST  24  /  ALT  53  /  AlkPhos  131  04-29          04-19 Chol 137 mg/dL LDL -- HDL 52 mg/dL Trig 68 mg/dL      Urinalysis Basic - ( 29 Apr 2024 06:12 )    Color: x / Appearance: x / SG: x / pH: x  Gluc: 92 mg/dL / Ketone: x  / Bili: x / Urobili: x   Blood: x / Protein: x / Nitrite: x   Leuk Esterase: x / RBC: x / WBC x   Sq Epi: x / Non Sq Epi: x / Bacteria: x            RADIOLOGY & ADDITIONAL TESTS:    Care Discussed with Consultants/Other Providers:

## 2024-04-29 NOTE — PROGRESS NOTE ADULT - ASSESSMENT
89 YO female with PMH of HTN, Dyslipidemia, Hypothyroid, cervical fusion, parotid mass, pituitary adenoma s/p resection (pathology benign)  who presented to [facility] on 4/14 with back pain x 1 week that has been progressive. CT lumbar spine with new mild T12 compression fracture without bony retropulsion. Seen by ortho who recommended conservative management with TLSO brace    #Compression Fracture  - T12 compression fracture without bony retropulsion, no surgical intervention.  - Fracture likely 2/2 osteoporosis  - Activity with TLSO    #Osteoporosis  - Calcium  BID  - Fosamax 70mg qSunday  - Vit D3      #L shoulder and L upper arm pain   - ROM very limited due to pain  - Voltaren gel added per rehab team   - check left shoulder and humerus xray    #Transaminitis  - LFT stable  - Prior abd US WNL   - Limit Tylenol   - c/w statin for now     #HTN  #orthostasis   - check orthostatic vital signs  - BP has been normotensive   - Pt has been off lisinopril 10mg daily d/t orthostatic hypotension     #HLD  - Lipitor 20mg daily    #Hypothyroidism  - Synthroid 50mcg daily    DVT ppx- Lovenox

## 2024-04-29 NOTE — PROGRESS NOTE ADULT - ATTENDING COMMENTS
Rehab Attending- Patient seen and examined by me - Case discussed, above note reviewed by me with modifications made    patient seen and evaluated bedside  back pain resolved with robaxin- no TLSO in use  developed left shoulder pain CW RTC tendinitis- on voltaren gel  x-rays pending  labs reviewed by me- glo   last BM 4/28- voiding  to continue intensive rehab program Rehab Attending- Patient seen and examined by me - Case discussed, above note reviewed by me with modifications made    patient seen and evaluated bedside  back pain resolved with robaxin- no TLSO in use  developed left shoulder pain CW RTC tendinitis- on voltaren gel  x-rays pending  some nausea/GI upset- related to alendronate?  labs reviewed by me- stable   last BM 4/28- voiding  to continue intensive rehab program

## 2024-04-29 NOTE — PROGRESS NOTE ADULT - SUBJECTIVE AND OBJECTIVE BOX
HPI:  Mrs. Nita Diaz is an 89-year-old female patient with PMH of HTN, Dyslipidemia, Hypothyroidism, and cervical fusion who presented to [facility] on 4/14 with back pain of one week from onset. The pain was progressive and located in the lower back, which occurred as she was moving cases of water at some time in preceding week, but doesn't remember a definitive sudden sensation of injury. She was seen by home service and given Tramadol at home once per day, which was helping, but still had difficulty completing ADLs because of the pain. Recent history additionally remarkable for a fall in October in which she landed on her left side. She denied any new urinary or bowel retention or incontinence. and has chronic constipation and urinary incontinence with no new worsening. Has never had a DEXA and has no previous history of fractures.    In the ED, VSS. CT lumbar spine with New mild T12 compression fracture without bony retropulsion since prior CT abdomen and pelvis 10/6/2023, correlate for point tenderness. Mild to moderate spinal canal stenosis or neural foraminal narrowing L2-3 through L4-5. Seen by orthopedics who recommended conservative management with TLSO brace and calcium/vit D supplementation.     Patient was evaluated by PM&R and therapy for functional deficits, gait/ADL impairments and acute rehabilitation was recommended. Patient was medically optimized for discharge to [facility] on 4/17/24. (17 Apr 2024 13:16)    TDD: 5/4 home  ___________________________________________________________________________    SUBJECTIVE/ROS  Patient was seen and evaluated at bedside and therapy today.  Reported no overnight or weekend events and is in no acute distress.   Describes some nausea yesterday and this AM, blames it on her breakfast. She did start alendronate Sunday 4/28. Will monitor.    Completed robaxin over the weekend, with no current back pain.   Last BM 4/28. Voiding with no issues.   Denies any CP, SOB, GONZALES, palpitations, fever, chills, body aches, cough, congestion, or any other symptoms at this time.   ___________________________________________________________________________  Vital Signs Last 24 Hrs  T(C): 36.7 (29 Apr 2024 07:57), Max: 36.7 (29 Apr 2024 07:57)  T(F): 98.1 (29 Apr 2024 07:57), Max: 98.1 (29 Apr 2024 07:57)  HR: 72 (29 Apr 2024 07:57) (70 - 72)  BP: 138/81 (29 Apr 2024 07:57) (120/68 - 138/81)  BP(mean): --  RR: 17 (29 Apr 2024 07:57) (17 - 18)  SpO2: 95% (29 Apr 2024 07:57) (95% - 98%)    Parameters below as of 29 Apr 2024 07:57  Patient On (Oxygen Delivery Method): room air    ___________________________________________________________________________  LABS  LABS:                          12.2   8.08  )-----------( 327      ( 29 Apr 2024 06:12 )             36.3     04-29    137  |  101  |  17  ----------------------------<  92  4.2   |  28  |  0.52    Ca    9.5      29 Apr 2024 06:12    TPro  6.7  /  Alb  2.7<L>  /  TBili  0.6  /  DBili  x   /  AST  24  /  ALT  53<H>  /  AlkPhos  131<H>  04-29    ___________________________________________________________________________    MEDICATIONS  (STANDING):  alendronate 70 milliGRAM(s) Oral <User Schedule>  atorvastatin 20 milliGRAM(s) Oral at bedtime  calcium carbonate   1250 mG (OsCal) 1 Tablet(s) Oral two times a day  cholecalciferol 1000 Unit(s) Oral daily  diclofenac sodium 1% Gel 2 Gram(s) Topical four times a day  enoxaparin Injectable 40 milliGRAM(s) SubCutaneous every 24 hours  levothyroxine 50 MICROGram(s) Oral daily  lidocaine   4% Patch 1 Patch Transdermal <User Schedule>  lidocaine   4% Patch 1 Patch Transdermal <User Schedule>  methocarbamol 500 milliGRAM(s) Oral three times a day  polyethylene glycol 3350 17 Gram(s) Oral daily  senna 2 Tablet(s) Oral at bedtime    MEDICATIONS  (PRN):  acetaminophen     Tablet .. 650 milliGRAM(s) Oral every 12 hours PRN Mild Pain (1 - 3)  bisacodyl Suppository 10 milliGRAM(s) Rectal daily PRN Constipation  ondansetron   Disintegrating Tablet 4 milliGRAM(s) Oral every 8 hours PRN Nausea and/or Vomiting    ___________________________________________________________________________    Gen - NAD, Comfortable  HEENT - NCAT, EOMI, MMM  Neck - Supple, No limited ROM  Pulm - CTAB, No wheeze, No rhonchi, No crackles  Cardiovascular - RRR, S1S2, + murmurs  Abdomen - Soft, NT/ND, +BS  Extremities - No clubbing, no cyanosis, no peripheral edema, no calf tenderness  Neuro-     Cognitive - Awake, Alert, Oriented  to self, place, date, year, situation. Able  to follow command     Communication - Fluent, Comprehensible     Attention: Intact      Memory: Recall 3 objects immediate and 3 min later         Cranial Nerves - CN 2-12 intact     Motor -                    LEFT    UE - 4+/5                    RIGHT UE - 4+/5                    LEFT    LE - 4/5                    RIGHT LE - 4/5        Sensory - Intact to LT     Reflexes - DTR brisk over patella Hasmukh, No primitive reflexes ? increased ext tone LEs     Coordination - FTN intact     Tone - normal  Psychiatric - Mood stable, Affect WNL  Skin:  all skin intact    ___________________________________________________________________________ HPI:  Mrs. Nita Diaz is an 89-year-old female patient with PMH of HTN, Dyslipidemia, Hypothyroidism, and cervical fusion who presented to [facility] on 4/14 with back pain of one week from onset. The pain was progressive and located in the lower back, which occurred as she was moving cases of water at some time in preceding week, but doesn't remember a definitive sudden sensation of injury. She was seen by home service and given Tramadol at home once per day, which was helping, but still had difficulty completing ADLs because of the pain. Recent history additionally remarkable for a fall in October in which she landed on her left side. She denied any new urinary or bowel retention or incontinence. and has chronic constipation and urinary incontinence with no new worsening. Has never had a DEXA and has no previous history of fractures.    In the ED, VSS. CT lumbar spine with New mild T12 compression fracture without bony retropulsion since prior CT abdomen and pelvis 10/6/2023, correlate for point tenderness. Mild to moderate spinal canal stenosis or neural foraminal narrowing L2-3 through L4-5. Seen by orthopedics who recommended conservative management with TLSO brace and calcium/vit D supplementation.     Patient was evaluated by PM&R and therapy for functional deficits, gait/ADL impairments and acute rehabilitation was recommended. Patient was medically optimized for discharge to [facility] on 4/17/24. (17 Apr 2024 13:16)    TDD: 5/4 home  ___________________________________________________________________________    SUBJECTIVE/ROS  Patient was seen and evaluated at bedside and therapy today.  Reported no overnight events and is in no acute distress.   Was started on voltaren gel for L shoulder pain over the weekend.   Describes some nausea yesterday and this AM, blames it on her breakfast. She did start alendronate Sunday 4/28. Will monitor.    Completed robaxin over the weekend, with no current back pain.   Last BM 4/28. Voiding with no issues.   Denies any CP, SOB, GONZALES, palpitations, fever, chills, body aches, cough, congestion, or any other symptoms at this time.   ___________________________________________________________________________  Vital Signs Last 24 Hrs  T(C): 36.7 (29 Apr 2024 07:57), Max: 36.7 (29 Apr 2024 07:57)  T(F): 98.1 (29 Apr 2024 07:57), Max: 98.1 (29 Apr 2024 07:57)  HR: 72 (29 Apr 2024 07:57) (70 - 72)  BP: 138/81 (29 Apr 2024 07:57) (120/68 - 138/81)  BP(mean): --  RR: 17 (29 Apr 2024 07:57) (17 - 18)  SpO2: 95% (29 Apr 2024 07:57) (95% - 98%)    Parameters below as of 29 Apr 2024 07:57  Patient On (Oxygen Delivery Method): room air    ___________________________________________________________________________  LABS  LABS:                          12.2   8.08  )-----------( 327      ( 29 Apr 2024 06:12 )             36.3     04-29    137  |  101  |  17  ----------------------------<  92  4.2   |  28  |  0.52    Ca    9.5      29 Apr 2024 06:12    TPro  6.7  /  Alb  2.7<L>  /  TBili  0.6  /  DBili  x   /  AST  24  /  ALT  53<H>  /  AlkPhos  131<H>  04-29    ___________________________________________________________________________    MEDICATIONS  (STANDING):  alendronate 70 milliGRAM(s) Oral <User Schedule>  atorvastatin 20 milliGRAM(s) Oral at bedtime  calcium carbonate   1250 mG (OsCal) 1 Tablet(s) Oral two times a day  cholecalciferol 1000 Unit(s) Oral daily  diclofenac sodium 1% Gel 2 Gram(s) Topical four times a day  enoxaparin Injectable 40 milliGRAM(s) SubCutaneous every 24 hours  levothyroxine 50 MICROGram(s) Oral daily  lidocaine   4% Patch 1 Patch Transdermal <User Schedule>  lidocaine   4% Patch 1 Patch Transdermal <User Schedule>  methocarbamol 500 milliGRAM(s) Oral three times a day  polyethylene glycol 3350 17 Gram(s) Oral daily  senna 2 Tablet(s) Oral at bedtime    MEDICATIONS  (PRN):  acetaminophen     Tablet .. 650 milliGRAM(s) Oral every 12 hours PRN Mild Pain (1 - 3)  bisacodyl Suppository 10 milliGRAM(s) Rectal daily PRN Constipation  ondansetron   Disintegrating Tablet 4 milliGRAM(s) Oral every 8 hours PRN Nausea and/or Vomiting    ___________________________________________________________________________    Gen - NAD, Comfortable  HEENT - NCAT, EOMI, MMM  Neck - Supple, No limited ROM  Pulm - CTAB, No wheeze, No rhonchi, No crackles  Cardiovascular - RRR, S1S2, + murmurs  Abdomen - Soft, NT/ND, +BS  Extremities - No clubbing, no cyanosis, no peripheral edema, no calf tenderness.   Tests- + Neer on left shoulder. Negative stewart. Has painful arc on L Sh abduction.   Neuro-     Cognitive - Awake, Alert, Oriented  to self, place, date, year, situation. Able  to follow command     Communication - Fluent, Comprehensible     Attention: Intact      Memory: Recall 3 objects immediate and 3 min later         Cranial Nerves - CN 2-12 intact     Motor -                    LEFT    UE - 4+/5                    RIGHT UE - 4+/5                    LEFT    LE - 4/5                    RIGHT LE - 4/5        Sensory - Intact to LT     Reflexes - DTR brisk over patella Hasmukh, No primitive reflexes ? increased ext tone LEs     Coordination - FTN intact     Tone - normal  Psychiatric - Mood stable, Affect WNL  Skin:  all skin intact    ___________________________________________________________________________

## 2024-04-29 NOTE — PROGRESS NOTE ADULT - ASSESSMENT
Mrs. Nita Diaz is an 89-year-old female patient with PMH of HTN, Dyslipidemia, Hypothyroidism, and cervical fusion who presented to [facility] on 4/14 with back pain of one week from onset. The pain was progressive and located in the lower back, which occurred as she was moving cases of water at some time in preceding week, but doesn't remember a definitive sudden sensation of injury. She was seen by home service and given Tramadol at home once per day, which was helping, but still had difficulty completing ADLs because of the pain. Recent history additionally remarkable for a fall in October in which she landed on her left side. She denied any new urinary or bowel retention or incontinence. and has chronic constipation and urinary incontinence with no new worsening. Has never had a DEXA and has no previous history of fractures.    In the ED, VSS. CT lumbar spine with New mild T12 compression fracture without bony retropulsion since prior CT abdomen and pelvis 10/6/2023, correlate for point tenderness. Mild to moderate spinal canal stenosis or neural foraminal narrowing L2-3 through L4-5. Seen by orthopedics who recommended conservative management with TLSO brace and calcium/vit D supplementation.   Patient was evaluated by PM&R and therapy for functional deficits, gait/ADL impairments and acute rehabilitation was recommended. Patient was medically optimized for discharge to [facility] IRF on 4/17/24.      #Traumatic thoracolumbar spine injuries and thoracic spine compression fracture  - S/P Falls  - T12 compression fracture without bony retropulsion      * TLSO when OOB  - Mild to moderate lumbar spinal canal stenosis, neural foraminal narrowing L2-3 through L4-5  - Impaired ADLs and mobility  - Need for assistance with personal care  - Comprehensive Rehab Program: PT/OT, 3hours daily and 5 days weekly      * PT: Focused on improving strength, endurance, coordination, balance, functional mobility, and transfers      * OT: Focused on improving strength, fine motor skills, coordination, posture and ADLs.      #orthostatic Hypotension  - teds/SCDs/abd binder  - OH negative, so not starting midodrine.     #KEYANA   - BUN/Cr 65/1.67 on 4/19  - IVF   - improved.       #transaminitis  - monitor  - avoid hepatoxic agents  - stable    #Osteoporosis  - calcium  BID  - Vit D3  increased to 1000iu daily.   - discussed possibility of starting bisphosphonates, patient now in agreement.   - alendronate 70mg weekly starting 4/28. Patient to be seated upright for atleast 30minutes afterwards.   - monitor for GI symptoms.   - f/u Dr. Mejia and PCP    #nausea  - zofran 4mg PRN    #HTN  - Lisinopril 10mg daily    #HLD  - Lipitor 20mg daily    #Hypothyroidism  - Synthroid 50mcg daily    #Pain management  - Tylenol PRN decreased   - lidocaine patch b/l low back    #DVT ppx  - Lovenox, SCD, TEDs    #Bowel Regimen  - Senna, miralax daily    #Bladder management  - BS on admission, and q 8 hours (SC if > 400)  - Monitor UO    #FEN   - Diet: DASH/TLC    #Skin:  - Skin on admission: Intact    #Precaution  - Fall, Aspiration, Spinal    #GOC  CODE STATUS: FULL CODE     Outpatient Follow-up (Specialty/Name of physician):    Tuyet Montgomery  Family Medicine  [address] [place] [address]  Phone: [phone]  Fax: [phone]  Follow Up Time: 1 week    John Milton  Orthopaedic Surgery  [address] [facility] [place] [facility], [address]  [place] [address]  Phone: [phone]  Fax: [phone]  Follow Up Time: 1 week    Rudy Ross  Otolaryngology  [address] [place] [address]  Phone: [phone]  Fax: [phone]  Follow Up Time:     Dat Langford  Cardiology  [address] [facility], [address]  [place] [address]  Phone: [phone]  Fax: [phone]  Follow Up Time: 2 weeks     Mrs. Nita Diaz is an 89-year-old female patient with PMH of HTN, Dyslipidemia, Hypothyroidism, and cervical fusion who presented to [facility] on 4/14 with back pain of one week from onset. The pain was progressive and located in the lower back, which occurred as she was moving cases of water at some time in preceding week, but doesn't remember a definitive sudden sensation of injury. She was seen by home service and given Tramadol at home once per day, which was helping, but still had difficulty completing ADLs because of the pain. Recent history additionally remarkable for a fall in October in which she landed on her left side. She denied any new urinary or bowel retention or incontinence. and has chronic constipation and urinary incontinence with no new worsening. Has never had a DEXA and has no previous history of fractures.    In the ED, VSS. CT lumbar spine with New mild T12 compression fracture without bony retropulsion since prior CT abdomen and pelvis 10/6/2023, correlate for point tenderness. Mild to moderate spinal canal stenosis or neural foraminal narrowing L2-3 through L4-5. Seen by orthopedics who recommended conservative management with TLSO brace and calcium/vit D supplementation.   Patient was evaluated by PM&R and therapy for functional deficits, gait/ADL impairments and acute rehabilitation was recommended. Patient was medically optimized for discharge to [facility] IRF on 4/17/24.      #Traumatic thoracolumbar spine injuries and thoracic spine compression fracture  - S/P Falls  - T12 compression fracture without bony retropulsion      * TLSO when OOB  - Mild to moderate lumbar spinal canal stenosis, neural foraminal narrowing L2-3 through L4-5  - Impaired ADLs and mobility  - Need for assistance with personal care  - Comprehensive Rehab Program: PT/OT, 3hours daily and 5 days weekly      * PT: Focused on improving strength, endurance, coordination, balance, functional mobility, and transfers      * OT: Focused on improving strength, fine motor skills, coordination, posture and ADLs.      #orthostatic Hypotension  - teds/SCDs/abd binder  - OH negative, so not starting midodrine.     #KEYANA   - BUN/Cr 65/1.67 on 4/19  - IVF   - improved.       #transaminitis  - monitor  - avoid hepatoxic agents  - stable    #Osteoporosis  - calcium  BID  - Vit D3  increased to 1000iu daily.   - discussed possibility of starting bisphosphonates, patient now in agreement.   - alendronate 70mg weekly starting 4/28. Patient to be seated upright for atleast 30minutes afterwards.   - monitor for GI symptoms.   - f/u Dr. Mejia and PCP    #nausea  - zofran 4mg PRN    #HTN  - Lisinopril 10mg daily    #HLD  - Lipitor 20mg daily    #Hypothyroidism  - Synthroid 50mcg daily    #Pain management  #Left rotator cuff tendonitis  - Tylenol PRN decreased   - lidocaine patch b/l low back  - completed robaxin for LBP. Improved.   - voltaren gel QID added over the weekend.     #DVT ppx  - Lovenox, SCD, TEDs    #Bowel Regimen  - Senna, miralax daily    #Bladder management  - BS on admission, and q 8 hours (SC if > 400)  - Monitor UO    #FEN   - Diet: DASH/TLC    #Skin:  - Skin on admission: Intact    #Precaution  - Fall, Aspiration, Spinal    #GOC  CODE STATUS: FULL CODE     Outpatient Follow-up (Specialty/Name of physician):    Tuyet Montgomery  Family Medicine  [address] [place] [address]  Phone: [phone]  Fax: [phone]  Follow Up Time: 1 week    John Milton  Orthopaedic Surgery  [address] [facility], [address]  [place] [address]  Phone: [phone]  Fax: [phone]  Follow Up Time: 1 week    Rudy Ross  Otolaryngology  [address] [place] [address]  Phone: [phone]  Fax: [phone]  Follow Up Time:     Dat Langford  Cardiology  [address] [facility], [address]  [place] [address]  Phone: [phone]  Fax: [phone]  Follow Up Time: 2 weeks     Mrs. Nita Diaz is an 89-year-old female patient with PMH of HTN, Dyslipidemia, Hypothyroidism, and cervical fusion who presented to [facility] on 4/14 with back pain of one week from onset. The pain was progressive and located in the lower back, which occurred as she was moving cases of water at some time in preceding week, but doesn't remember a definitive sudden sensation of injury. She was seen by home service and given Tramadol at home once per day, which was helping, but still had difficulty completing ADLs because of the pain. Recent history additionally remarkable for a fall in October in which she landed on her left side. She denied any new urinary or bowel retention or incontinence. and has chronic constipation and urinary incontinence with no new worsening. Has never had a DEXA and has no previous history of fractures.    In the ED, VSS. CT lumbar spine with New mild T12 compression fracture without bony retropulsion since prior CT abdomen and pelvis 10/6/2023, correlate for point tenderness. Mild to moderate spinal canal stenosis or neural foraminal narrowing L2-3 through L4-5. Seen by orthopedics who recommended conservative management with TLSO brace and calcium/vit D supplementation.   Patient was evaluated by PM&R and therapy for functional deficits, gait/ADL impairments and acute rehabilitation was recommended. Patient was medically optimized for discharge to [facility] IRF on 4/17/24.      #Traumatic thoracolumbar spine injuries and thoracic spine compression fracture  - S/P Falls  - T12 compression fracture without bony retropulsion      * TLSO when OOB  - Mild to moderate lumbar spinal canal stenosis, neural foraminal narrowing L2-3 through L4-5  - Impaired ADLs and mobility  - Need for assistance with personal care  - Comprehensive Rehab Program: PT/OT, 3hours daily and 5 days weekly      * PT: Focused on improving strength, endurance, coordination, balance, functional mobility, and transfers      * OT: Focused on improving strength, fine motor skills, coordination, posture and ADLs.      #orthostatic Hypotension  - teds/SCDs/abd binder  - OH negative, so not starting midodrine.     #KEYANA   - BUN/Cr 65/1.67 on 4/19  - IVF   - improved.       #transaminitis  - monitor  - avoid hepatoxic agents  - stable    #Osteoporosis  - calcium  BID  - Vit D3  increased to 1000iu daily.   - discussed possibility of starting bisphosphonates, patient now in agreement.   - alendronate 70mg weekly starting 4/28. Patient to be seated upright for atleast 30minutes afterwards.   - monitor for GI symptoms.   - f/u Dr. Mejia and PCP    #nausea  - zofran 4mg PRN    #HTN  - Lisinopril 10mg daily    #HLD  - Lipitor 20mg daily    #Hypothyroidism  - Synthroid 50mcg daily    #Pain management  #Left rotator cuff tendonitis  - Tylenol PRN decreased   - lidocaine patch b/l low back  - completed robaxin for LBP. Improved.   - voltaren gel QID added over the weekend.   - f/u L shoulder xray    #DVT ppx  - Lovenox, SCD, TEDs    #Bowel Regimen  - Senna, miralax daily    #Bladder management  - BS on admission, and q 8 hours (SC if > 400)  - Monitor UO    #FEN   - Diet: DASH/TLC    #Skin:  - Skin on admission: Intact    #Precaution  - Fall, Aspiration, Spinal    #GOC  CODE STATUS: FULL CODE     Outpatient Follow-up (Specialty/Name of physician):    Tuyet Montgomery  Family Medicine  [address] [place] [address]  Phone: [phone]  Fax: [phone]  Follow Up Time: 1 week    John Milton  Orthopaedic Surgery  [address] [facility], [address]  [place] [address]  Phone: [phone]  Fax: [phone]  Follow Up Time: 1 week    Rudy Ross  Otolaryngology  [address] [place] [address]  Phone: [phone]  Fax: [phone]  Follow Up Time:     Dat Langford  Cardiology  [address] [facility], [address]  [place] [address]  Phone: [phone]  Fax: [phone]  Follow Up Time: 2 weeks

## 2024-04-30 PROCEDURE — 99232 SBSQ HOSP IP/OBS MODERATE 35: CPT | Mod: GC

## 2024-04-30 RX ADMIN — Medication 50 MICROGRAM(S): at 05:25

## 2024-04-30 RX ADMIN — Medication 1 TABLET(S): at 16:15

## 2024-04-30 RX ADMIN — LIDOCAINE 1 PATCH: 4 CREAM TOPICAL at 05:32

## 2024-04-30 RX ADMIN — ATORVASTATIN CALCIUM 20 MILLIGRAM(S): 80 TABLET, FILM COATED ORAL at 21:55

## 2024-04-30 RX ADMIN — Medication 1 TABLET(S): at 05:25

## 2024-04-30 RX ADMIN — SENNA PLUS 2 TABLET(S): 8.6 TABLET ORAL at 21:55

## 2024-04-30 RX ADMIN — DICLOFENAC SODIUM 2 GRAM(S): 30 GEL TOPICAL at 16:15

## 2024-04-30 RX ADMIN — Medication 1000 UNIT(S): at 11:20

## 2024-04-30 RX ADMIN — DICLOFENAC SODIUM 2 GRAM(S): 30 GEL TOPICAL at 21:55

## 2024-04-30 RX ADMIN — LIDOCAINE 1 PATCH: 4 CREAM TOPICAL at 07:27

## 2024-04-30 RX ADMIN — DICLOFENAC SODIUM 2 GRAM(S): 30 GEL TOPICAL at 11:20

## 2024-04-30 RX ADMIN — DICLOFENAC SODIUM 2 GRAM(S): 30 GEL TOPICAL at 05:25

## 2024-04-30 RX ADMIN — ENOXAPARIN SODIUM 40 MILLIGRAM(S): 100 INJECTION SUBCUTANEOUS at 16:15

## 2024-04-30 NOTE — PROGRESS NOTE ADULT - SUBJECTIVE AND OBJECTIVE BOX
HPI:  Mrs. Nita Diaz is an 89-year-old female patient with PMH of HTN, Dyslipidemia, Hypothyroidism, and cervical fusion who presented to [facility] on 4/14 with back pain of one week from onset. The pain was progressive and located in the lower back, which occurred as she was moving cases of water at some time in preceding week, but doesn't remember a definitive sudden sensation of injury. She was seen by home service and given Tramadol at home once per day, which was helping, but still had difficulty completing ADLs because of the pain. Recent history additionally remarkable for a fall in October in which she landed on her left side. She denied any new urinary or bowel retention or incontinence. and has chronic constipation and urinary incontinence with no new worsening. Has never had a DEXA and has no previous history of fractures.    In the ED, VSS. CT lumbar spine with New mild T12 compression fracture without bony retropulsion since prior CT abdomen and pelvis 10/6/2023, correlate for point tenderness. Mild to moderate spinal canal stenosis or neural foraminal narrowing L2-3 through L4-5. Seen by orthopedics who recommended conservative management with TLSO brace and calcium/vit D supplementation.     Patient was evaluated by PM&R and therapy for functional deficits, gait/ADL impairments and acute rehabilitation was recommended. Patient was medically optimized for discharge to [facility] IRF on 4/17/24. (17 Apr 2024 13:16)    TDD: 5/4 home  ___________________________________________________________________________    SUBJECTIVE/ROS  Patient was seen and evaluated at bedside and therapy today.  Reported no overnight events and is in no acute distress.   No longer complains of L shoulder pain since starting voltaren gel. Xray unremarkable.   No longer has nausea.   Last BM today 4/30. Voiding with no issues.   Denies any CP, SOB, GONZALES, palpitations, fever, chills, body aches, cough, congestion, or any other symptoms at this time.   ___________________________________________________________________________  Vital Signs Last 24 Hrs  T(C): 36.4 (29 Apr 2024 22:18), Max: 36.4 (29 Apr 2024 22:18)  T(F): 97.6 (29 Apr 2024 22:18), Max: 97.6 (29 Apr 2024 22:18)  HR: 70 (29 Apr 2024 22:18) (70 - 70)  BP: 122/78 (29 Apr 2024 22:18) (122/78 - 122/78)  BP(mean): --  RR: 18 (29 Apr 2024 22:18) (18 - 18)  SpO2: 96% (29 Apr 2024 22:18) (96% - 96%)    Parameters below as of 29 Apr 2024 22:18  Patient On (Oxygen Delivery Method): room air    ___________________________________________________________________________  LABS                            12.2   8.08  )-----------( 327      ( 29 Apr 2024 06:12 )             36.3     04-29    137  |  101  |  17  ----------------------------<  92  4.2   |  28  |  0.52    Ca    9.5      29 Apr 2024 06:12    TPro  6.7  /  Alb  2.7<L>  /  TBili  0.6  /  DBili  x   /  AST  24  /  ALT  53<H>  /  AlkPhos  131<H>  04-29    ___________________________________________________________________________    MEDICATIONS  (STANDING):  alendronate 70 milliGRAM(s) Oral <User Schedule>  atorvastatin 20 milliGRAM(s) Oral at bedtime  calcium carbonate   1250 mG (OsCal) 1 Tablet(s) Oral two times a day  cholecalciferol 1000 Unit(s) Oral daily  diclofenac sodium 1% Gel 2 Gram(s) Topical four times a day  enoxaparin Injectable 40 milliGRAM(s) SubCutaneous every 24 hours  levothyroxine 50 MICROGram(s) Oral daily  lidocaine   4% Patch 1 Patch Transdermal <User Schedule>  lidocaine   4% Patch 1 Patch Transdermal <User Schedule>  polyethylene glycol 3350 17 Gram(s) Oral daily  senna 2 Tablet(s) Oral at bedtime    MEDICATIONS  (PRN):  acetaminophen     Tablet .. 650 milliGRAM(s) Oral every 12 hours PRN Mild Pain (1 - 3)  bisacodyl Suppository 10 milliGRAM(s) Rectal daily PRN Constipation  ondansetron   Disintegrating Tablet 4 milliGRAM(s) Oral every 8 hours PRN Nausea and/or Vomiting      ___________________________________________________________________________    Gen - NAD, Comfortable  HEENT - NCAT, EOMI, MMM  Neck - Supple, No limited ROM  Pulm - CTAB, No wheeze, No rhonchi, No crackles  Cardiovascular - RRR, S1S2, + murmurs  Abdomen - Soft, NT/ND, +BS  Extremities - No clubbing, no cyanosis, no peripheral edema, no calf tenderness.   Tests- + Neer on left shoulder. Negative stewart. Has painful arc on L Sh abduction. Improving.   Neuro-     Cognitive - Awake, Alert, Oriented  to self, place, date, year, situation. Able  to follow command     Communication - Fluent, Comprehensible     Attention: Intact      Memory: Recall 3 objects immediate and 3 min later         Cranial Nerves - CN 2-12 intact     Motor -                    LEFT    UE - 4+/5                    RIGHT UE - 4+/5                    LEFT    LE - 4/5                    RIGHT LE - 4/5        Sensory - Intact to LT     Reflexes - DTR brisk over patella Hasmukh, No primitive reflexes ? increased ext tone LEs     Coordination - FTN intact     Tone - normal  Psychiatric - Mood stable, Affect WNL  Skin:  all skin intact    ___________________________________________________________________________

## 2024-04-30 NOTE — PROGRESS NOTE ADULT - ATTENDING COMMENTS
Rehab Attending- Patient seen and examined by me - Case discussed, above note reviewed by me with modifications made    patient seen and evaluated bedside  left shoulder pain resolved   no reported back pain  discussed in team-tentative DC  home 5/4  family training on 5/3 with patient's son  to continue intensive rehab program

## 2024-04-30 NOTE — CHART NOTE - NSCHARTNOTEFT_GEN_A_CORE
Nutrition Follow Up Note  Hospital Course   (Per Electronic Medical Record)    Source:  Patient [X]  Medical Record [X]      Diet:   Regular Diet (IDDSI Level 7) w/ Thin Liquids (IDDSI Level 0)  Tolerates Diet Consistency Well  No Chewing/Swallowing Difficulties  No Recent Nausea, Vomiting, Diarrhea or Constipation (as Per Patient)  Consumes % of Meals (as Per Documentation) - States Good PO Intake/Appetite (Per Patient)  on Ensure Plus High Protein 8oz PO Daily (Provides 350kcal & 20grams of Protein)  Patient Takes Nutrition Supplement Well    Enteral/Parenteral Nutrition: Not Applicable    Current Weight: 128lb on 4/28  Obtain Weights Weekly  Weights Currently Stable @This Time     Pertinent Medications: MEDICATIONS  (STANDING):  alendronate 70 milliGRAM(s) Oral <User Schedule>  atorvastatin 20 milliGRAM(s) Oral at bedtime  calcium carbonate   1250 mG (OsCal) 1 Tablet(s) Oral two times a day  cholecalciferol 1000 Unit(s) Oral daily  diclofenac sodium 1% Gel 2 Gram(s) Topical four times a day  enoxaparin Injectable 40 milliGRAM(s) SubCutaneous every 24 hours  levothyroxine 50 MICROGram(s) Oral daily  lidocaine   4% Patch 1 Patch Transdermal <User Schedule>  lidocaine   4% Patch 1 Patch Transdermal <User Schedule>  polyethylene glycol 3350 17 Gram(s) Oral daily  senna 2 Tablet(s) Oral at bedtime    MEDICATIONS  (PRN):  acetaminophen     Tablet .. 650 milliGRAM(s) Oral every 12 hours PRN Mild Pain (1 - 3)  bisacodyl Suppository 10 milliGRAM(s) Rectal daily PRN Constipation  ondansetron   Disintegrating Tablet 4 milliGRAM(s) Oral every 8 hours PRN Nausea and/or Vomiting    Pertinent Labs:  04-29 Na137 mmol/L Glu 92 mg/dL K+ 4.2 mmol/L Cr  0.52 mg/dL BUN 17 mg/dL 04-29 Alb 2.7 g/dL<L> 04-19 Chol 137 mg/dL LDL --    HDL 52 mg/dL Trig 68 mg/dL    Skin: No Pressure Ulcers     Edema: None Noted (as Per Documentation)     Last Bowel Movement: on 4/29    Estimated Needs:   [X] No Change Since Previous Assessment    Previous Nutrition Diagnosis:   No Active Nutrition Dx @ This Present Time    Nutrition Diagnosis is [X] Not Applicable    New Nutrition Diagnosis: [X] Not Applicable    Interventions:   1. Recommend Continue Nutrition Plan of Care     Monitoring & Evaluation:   [X] Weights   [X] PO Intake   [X] Skin Integrity   [X] Follow Up (Per Protocol)  [X] Tolerance to Diet Prescription   [X] Other: Labs     Registered Dietitian/Nutritionist Remains Available.  Errol Chatman RDN, CDN    Phone# [phone]

## 2024-04-30 NOTE — PROGRESS NOTE ADULT - ASSESSMENT
Mrs. Nita Diaz is an 89-year-old female patient with PMH of HTN, Dyslipidemia, Hypothyroidism, and cervical fusion who presented to [facility] on 4/14 with back pain of one week from onset. The pain was progressive and located in the lower back, which occurred as she was moving cases of water at some time in preceding week, but doesn't remember a definitive sudden sensation of injury. She was seen by home service and given Tramadol at home once per day, which was helping, but still had difficulty completing ADLs because of the pain. Recent history additionally remarkable for a fall in October in which she landed on her left side. She denied any new urinary or bowel retention or incontinence. and has chronic constipation and urinary incontinence with no new worsening. Has never had a DEXA and has no previous history of fractures.    In the ED, VSS. CT lumbar spine with New mild T12 compression fracture without bony retropulsion since prior CT abdomen and pelvis 10/6/2023, correlate for point tenderness. Mild to moderate spinal canal stenosis or neural foraminal narrowing L2-3 through L4-5. Seen by orthopedics who recommended conservative management with TLSO brace and calcium/vit D supplementation.   Patient was evaluated by PM&R and therapy for functional deficits, gait/ADL impairments and acute rehabilitation was recommended. Patient was medically optimized for discharge to [facility] IRF on 4/17/24.      #Traumatic thoracolumbar spine injuries and thoracic spine compression fracture  - S/P Falls  - T12 compression fracture without bony retropulsion      * TLSO when OOB  - Mild to moderate lumbar spinal canal stenosis, neural foraminal narrowing L2-3 through L4-5  - Impaired ADLs and mobility  - Need for assistance with personal care  - Comprehensive Rehab Program: PT/OT, 3hours daily and 5 days weekly      * PT: Focused on improving strength, endurance, coordination, balance, functional mobility, and transfers      * OT: Focused on improving strength, fine motor skills, coordination, posture and ADLs.      #orthostatic Hypotension  - teds/SCDs/abd binder  - OH negative, so not starting midodrine.     #KEYANA   - BUN/Cr 65/1.67 on 4/19  - IVF   - improved.       #transaminitis  - monitor  - avoid hepatoxic agents  - stable    #Osteoporosis  - calcium  BID  - Vit D3  increased to 1000iu daily.   - discussed possibility of starting bisphosphonates, patient now in agreement.   - alendronate 70mg weekly starting 4/28. Patient to be seated upright for atleast 30minutes afterwards.   - monitor for GI symptoms.   - f/u Dr. Mejia and PCP    #nausea  - zofran 4mg PRN    #HTN  - Lisinopril 10mg daily    #HLD  - Lipitor 20mg daily    #Hypothyroidism  - Synthroid 50mcg daily    #Pain management  #Left rotator cuff tendonitis  - Tylenol PRN decreased   - lidocaine patch b/l low back  - completed robaxin for LBP. Improved.   - voltaren gel QID added over the weekend. Continue until DC date.  - xray left shoulder: unremarkable.     #DVT ppx  - Lovenox, SCD, TEDs    #Bowel Regimen  - Senna, miralax daily    #Bladder management  - BS on admission, and q 8 hours (SC if > 400)  - Monitor UO    #FEN   - Diet: DASH/TLC    #Skin:  - Skin on admission: Intact    #Precaution  - Fall, Aspiration, Spinal    #GOC  CODE STATUS: FULL CODE     Outpatient Follow-up (Specialty/Name of physician):    Tuyet Montgomery  Family Medicine  [address] [place] [address]  Phone: [phone]  Fax: [phone]  Follow Up Time: 1 week    John Milton  Orthopaedic Surgery  [address] [facility], [address]  [place] [address]  Phone: [phone]  Fax: [phone]  Follow Up Time: 1 week    Rudy Ross  Otolaryngology  [address] [place] [address]  Phone: [phone]  Fax: [phone]  Follow Up Time:     Dat Langford  Cardiology  [address] [facility], [address]  [place] [address]  Phone: [phone]  Fax: [phone]  Follow Up Time: 2 weeks

## 2024-05-01 PROCEDURE — 99232 SBSQ HOSP IP/OBS MODERATE 35: CPT

## 2024-05-01 RX ADMIN — DICLOFENAC SODIUM 2 GRAM(S): 30 GEL TOPICAL at 12:13

## 2024-05-01 RX ADMIN — ENOXAPARIN SODIUM 40 MILLIGRAM(S): 100 INJECTION SUBCUTANEOUS at 17:47

## 2024-05-01 RX ADMIN — LIDOCAINE 1 PATCH: 4 CREAM TOPICAL at 05:41

## 2024-05-01 RX ADMIN — DICLOFENAC SODIUM 2 GRAM(S): 30 GEL TOPICAL at 17:47

## 2024-05-01 RX ADMIN — Medication 1 TABLET(S): at 05:43

## 2024-05-01 RX ADMIN — POLYETHYLENE GLYCOL 3350 17 GRAM(S): 17 POWDER, FOR SOLUTION ORAL at 12:12

## 2024-05-01 RX ADMIN — Medication 1 TABLET(S): at 17:46

## 2024-05-01 RX ADMIN — Medication 1000 UNIT(S): at 12:11

## 2024-05-01 RX ADMIN — Medication 50 MICROGRAM(S): at 05:42

## 2024-05-01 RX ADMIN — LIDOCAINE 1 PATCH: 4 CREAM TOPICAL at 05:40

## 2024-05-01 RX ADMIN — ATORVASTATIN CALCIUM 20 MILLIGRAM(S): 80 TABLET, FILM COATED ORAL at 21:31

## 2024-05-01 RX ADMIN — SENNA PLUS 2 TABLET(S): 8.6 TABLET ORAL at 21:31

## 2024-05-01 RX ADMIN — DICLOFENAC SODIUM 2 GRAM(S): 30 GEL TOPICAL at 05:40

## 2024-05-01 NOTE — PROGRESS NOTE ADULT - ASSESSMENT
88 YO female with PMH of HTN, Dyslipidemia, Hypothyroid, cervical fusion, parotid mass, pituitary adenoma s/p resection (pathology benign)  who presented to [facility] on 4/14 with back pain x 1 week that has been progressive. CT lumbar spine with new mild T12 compression fracture without bony retropulsion. Seen by ortho who recommended conservative management with TLSO brace    #Compression Fracture  - T12 compression fracture without bony retropulsion, no surgical intervention.  - Fracture likely 2/2 osteoporosis  - Activity with TLSO    #Osteoporosis  - Calcium  BID  - Fosamax 70mg qSunday  - Vit D3      #L shoulder pain secondary to suspected rotator cuff tendonitis - pain resolved  - ROM very limited due to pain  - Voltaren gel added per rehab team   - left shoulder xray showed degenerative changes  - left humerus xray negative     #Transaminitis  - LFT stable  - Prior abd US WNL   - Limit Tylenol   - c/w statin for now     #HTN  #orthostasis   - check orthostatic vital signs  - BP has been normotensive   - Pt has been off lisinopril 10mg daily d/t orthostatic hypotension     #HLD  - Lipitor 20mg daily    #Hypothyroidism  - Synthroid 50mcg daily    DVT ppx- Lovenox

## 2024-05-01 NOTE — PROGRESS NOTE ADULT - ASSESSMENT
Mrs. Nita Diaz is an 89-year-old female patient with PMH of HTN, Dyslipidemia, Hypothyroidism, and cervical fusion who presented to [facility] on 4/14 with back pain of one week from onset. The pain was progressive and located in the lower back, which occurred as she was moving cases of water at some time in preceding week, but doesn't remember a definitive sudden sensation of injury. She was seen by home service and given Tramadol at home once per day, which was helping, but still had difficulty completing ADLs because of the pain. Recent history additionally remarkable for a fall in October in which she landed on her left side. She denied any new urinary or bowel retention or incontinence. and has chronic constipation and urinary incontinence with no new worsening. Has never had a DEXA and has no previous history of fractures.    In the ED, VSS. CT lumbar spine with New mild T12 compression fracture without bony retropulsion since prior CT abdomen and pelvis 10/6/2023, correlate for point tenderness. Mild to moderate spinal canal stenosis or neural foraminal narrowing L2-3 through L4-5. Seen by orthopedics who recommended conservative management with TLSO brace and calcium/vit D supplementation.   Patient was evaluated by PM&R and therapy for functional deficits, gait/ADL impairments and acute rehabilitation was recommended. Patient was medically optimized for discharge to [facility] IRF on 4/17/24.      #Traumatic thoracolumbar spine injuries and thoracic spine compression fracture  - S/P Falls  - T12 compression fracture without bony retropulsion      * TLSO when OOB  - Mild to moderate lumbar spinal canal stenosis, neural foraminal narrowing L2-3 through L4-5  - Impaired ADLs and mobility  - Need for assistance with personal care  - Comprehensive Rehab Program: PT/OT, 3hours daily and 5 days weekly      * PT: Focused on improving strength, endurance, coordination, balance, functional mobility, and transfers      * OT: Focused on improving strength, fine motor skills, coordination, posture and ADLs.      #orthostatic Hypotension  - teds/SCDs/abd binder  - OH negative, so not starting midodrine.     #KEYANA   - BUN/Cr 65/1.67 on 4/19  - IVF   - improved.       #transaminitis  - monitor  - avoid hepatoxic agents  - stable    #Osteoporosis  - calcium  BID  - Vit D3  increased to 1000iu daily.   - discussed possibility of starting bisphosphonates, patient now in agreement.   - alendronate 70mg weekly starting 4/28. Patient to be seated upright for atleast 30minutes afterwards.   - monitor for GI symptoms.   - f/u Dr. Mejia and PCP    #nausea  - zofran 4mg PRN    #HTN  - Lisinopril 10mg daily    #HLD  - Lipitor 20mg daily    #Hypothyroidism  - Synthroid 50mcg daily    #Pain management  #Left rotator cuff tendonitis  - Tylenol PRN decreased   - lidocaine patch b/l low back  - completed robaxin for LBP. Improved.   - voltaren gel QID added over the weekend. Continue until DC date.  - xray left shoulder: unremarkable.     #DVT ppx  - Lovenox, SCD, TEDs    #Bowel Regimen  - Senna, miralax daily    #Bladder management  - BS on admission, and q 8 hours (SC if > 400)  - Monitor UO    #FEN   - Diet: DASH/TLC    #Skin:  - Skin on admission: Intact    #Precaution  - Fall, Aspiration, Spinal    #GOC  CODE STATUS: FULL CODE     Outpatient Follow-up (Specialty/Name of physician):    Tuyet Montgomery  Family Medicine  [address] [place] [id number]  Phone: [phone]  Fax: [phone]  Follow Up Time: 1 week    John Milton  Orthopaedic Surgery  [address], [address]  [place] [address]  Phone: [phone]  Fax: [phone]  Follow Up Time: 1 week    Rudy Ross  Otolaryngology  [address] [place] [address]  Phone: [phone]  Fax: [phone]  Follow Up Time:     Dat Langford  Cardiology  [address], [address]  [place] [address]  Phone: [phone]  Fax: [phone]  Follow Up Time: 2 weeks

## 2024-05-01 NOTE — PROGRESS NOTE ADULT - SUBJECTIVE AND OBJECTIVE BOX
Patient is a 89y old  Female who presents with a chief complaint of Traumatic thoracolumbar spine injuries and thoracic spine compression fracture (01 May 2024 11:51)      Subjective and overnight events:   Patient seen and examined at bedside. no complaints. no fever, chills, sob, cp, abd pain. reports left shoulder pain resolved.     ALLERGIES:  No Known Allergies    MEDICATIONS  (STANDING):  alendronate 70 milliGRAM(s) Oral <User Schedule>  atorvastatin 20 milliGRAM(s) Oral at bedtime  calcium carbonate   1250 mG (OsCal) 1 Tablet(s) Oral two times a day  cholecalciferol 1000 Unit(s) Oral daily  diclofenac sodium 1% Gel 2 Gram(s) Topical four times a day  enoxaparin Injectable 40 milliGRAM(s) SubCutaneous every 24 hours  levothyroxine 50 MICROGram(s) Oral daily  lidocaine   4% Patch 1 Patch Transdermal <User Schedule>  lidocaine   4% Patch 1 Patch Transdermal <User Schedule>  polyethylene glycol 3350 17 Gram(s) Oral daily  senna 2 Tablet(s) Oral at bedtime    MEDICATIONS  (PRN):  acetaminophen     Tablet .. 650 milliGRAM(s) Oral every 12 hours PRN Mild Pain (1 - 3)  bisacodyl Suppository 10 milliGRAM(s) Rectal daily PRN Constipation  ondansetron   Disintegrating Tablet 4 milliGRAM(s) Oral every 8 hours PRN Nausea and/or Vomiting    Vital Signs Last 24 Hrs  T(F): 97.8 (01 May 2024 08:57), Max: 97.8 (30 Apr 2024 23:26)  HR: 61 (01 May 2024 08:57) (61 - 74)  BP: 112/67 (01 May 2024 08:57) (99/56 - 130/76)  RR: 16 (01 May 2024 08:57) (16 - 18)  SpO2: 96% (01 May 2024 08:57) (96% - 96%)  I&O's Summary    PHYSICAL EXAM:  General: NAD, A/O x 3  ENT: MMM  Neck: Supple, No JVD  Lungs: Clear to auscultation bilaterally  Cardio: RRR, S1/S2, + murmurs  Abdomen: Soft, Nontender, Nondistended; Bowel sounds present  Extremities: No calf tenderness, No pitting edema    LABS:                        12.2   8.08  )-----------( 327      ( 29 Apr 2024 06:12 )             36.3     04-29    137  |  101  |  17  ----------------------------<  92  4.2   |  28  |  0.52    Ca    9.5      29 Apr 2024 06:12    TPro  6.7  /  Alb  2.7  /  TBili  0.6  /  DBili  x   /  AST  24  /  ALT  53  /  AlkPhos  131  04-29          04-19 Chol 137 mg/dL LDL -- HDL 52 mg/dL Trig 68 mg/dL          Urinalysis Basic - ( 29 Apr 2024 06:12 )    Color: x / Appearance: x / SG: x / pH: x  Gluc: 92 mg/dL / Ketone: x  / Bili: x / Urobili: x   Blood: x / Protein: x / Nitrite: x   Leuk Esterase: x / RBC: x / WBC x   Sq Epi: x / Non Sq Epi: x / Bacteria: x            RADIOLOGY & ADDITIONAL TESTS:    Care Discussed with Consultants/Other Providers:

## 2024-05-01 NOTE — PROGRESS NOTE ADULT - SUBJECTIVE AND OBJECTIVE BOX
HPI:  Mrs. Nita Diaz is an 89-year-old female patient with PMH of HTN, Dyslipidemia, Hypothyroidism, and cervical fusion who presented to [facility] on 4/14 with back pain of one week from onset. The pain was progressive and located in the lower back, which occurred as she was moving cases of water at some time in preceding week, but doesn't remember a definitive sudden sensation of injury. She was seen by home service and given Tramadol at home once per day, which was helping, but still had difficulty completing ADLs because of the pain. Recent history additionally remarkable for a fall in October in which she landed on her left side. She denied any new urinary or bowel retention or incontinence. and has chronic constipation and urinary incontinence with no new worsening. Has never had a DEXA and has no previous history of fractures.    In the ED, VSS. CT lumbar spine with New mild T12 compression fracture without bony retropulsion since prior CT abdomen and pelvis 10/6/2023, correlate for point tenderness. Mild to moderate spinal canal stenosis or neural foraminal narrowing L2-3 through L4-5. Seen by orthopedics who recommended conservative management with TLSO brace and calcium/vit D supplementation.     Patient was evaluated by PM&R and therapy for functional deficits, gait/ADL impairments and acute rehabilitation was recommended. Patient was medically optimized for discharge to [facility] IRF on 4/17/24. (17 Apr 2024 13:16)    TDD: 5/4 home  ___________________________________________________________________________    SUBJECTIVE/ROS  Patient was seen and evaluated at bedside and therapy today.  Reported no overnight events and is in no acute distress.   Notes no recurrence of left shoulder pain nor of any nausea.   GI: Last BM yesterday 4/30  : Voiding independently with no issues.   Case was discussed at Interdisciplinary Team meeting yesterday.  A tentative discharge date is outlined above.  Patient is motivated to continue participation on the recommended rehabilitation program.  Denies any CP, SOB, GONZALES, palpitations, fever, chills, body aches, cough, congestion, or any other symptoms at this time.   ___________________________________________________________________________    Vital Signs Last 24 Hrs  T(C): 36.6 (01 May 2024 08:57), Max: 36.6 (30 Apr 2024 23:26)  T(F): 97.8 (01 May 2024 08:57), Max: 97.8 (30 Apr 2024 23:26)  HR: 61 (01 May 2024 08:57) (61 - 74)  BP: 112/67 (01 May 2024 08:57) (99/56 - 130/76)  RR: 16 (01 May 2024 08:57) (16 - 18)  SpO2: 96% (01 May 2024 08:57) (96% - 96%)    ___________________________________________________________________________    LABS                        12.2   8.08  )-----------( 327      ( 29 Apr 2024 06:12 )             36.3     04-29    137  |  101  |  17  ----------------------------<  92  4.2   |  28  |  0.52    Ca    9.5      29 Apr 2024 06:12    TPro  6.7  /  Alb  2.7<L>  /  TBili  0.6  /  DBili  x   /  AST  24  /  ALT  53<H>  /  AlkPhos  131<H>  04-29    ___________________________________________________________________________    MEDICATIONS  (STANDING):  alendronate 70 milliGRAM(s) Oral <User Schedule>  atorvastatin 20 milliGRAM(s) Oral at bedtime  calcium carbonate   1250 mG (OsCal) 1 Tablet(s) Oral two times a day  cholecalciferol 1000 Unit(s) Oral daily  diclofenac sodium 1% Gel 2 Gram(s) Topical four times a day  enoxaparin Injectable 40 milliGRAM(s) SubCutaneous every 24 hours  levothyroxine 50 MICROGram(s) Oral daily  lidocaine   4% Patch 1 Patch Transdermal <User Schedule>  lidocaine   4% Patch 1 Patch Transdermal <User Schedule>  polyethylene glycol 3350 17 Gram(s) Oral daily  senna 2 Tablet(s) Oral at bedtime    MEDICATIONS  (PRN):  acetaminophen     Tablet .. 650 milliGRAM(s) Oral every 12 hours PRN Mild Pain (1 - 3)  bisacodyl Suppository 10 milliGRAM(s) Rectal daily PRN Constipation  ondansetron   Disintegrating Tablet 4 milliGRAM(s) Oral every 8 hours PRN Nausea and/or Vomiting    ___________________________________________________________________________    Gen - NAD, Comfortable  HEENT - NCAT, EOMI, MMM  Neck - Supple, No limited ROM  Pulm - CTAB, No wheeze, No rhonchi, No crackles  Cardiovascular - RRR, S1S2, + murmurs  Abdomen - Soft, NT/ND, +BS  Extremities - No clubbing, no cyanosis, no peripheral edema, no calf tenderness.   Tests- + Neer on left shoulder. Negative Pérez Has painful arc on L Sh abduction. Improving.   Neuro-     Cognitive - Awake, Alert, Oriented  to self, place, date, year, situation. Able  to follow command     Communication - Fluent, Comprehensible     Attention: Intact      Memory: Recall 3 objects immediate and 3 min later         Cranial Nerves - CN 2-12 intact     Motor -                    LEFT    UE - 4+/5                    RIGHT UE - 4+/5                    LEFT    LE - 4/5                    RIGHT LE - 4/5        Sensory - Intact to LT     Reflexes - DTR brisk over patella Hasmukh, No primitive reflexes ? increased ext tone LEs     Coordination - FTN intact     Tone - normal  Psychiatric - Mood stable, Affect WNL  Skin:  all skin intact    ___________________________________________________________________________

## 2024-05-02 LAB
ALBUMIN SERPL ELPH-MCNC: 2.6 G/DL — LOW (ref 3.3–5)
ALP SERPL-CCNC: 120 U/L — SIGNIFICANT CHANGE UP (ref 40–120)
ALT FLD-CCNC: 36 U/L — SIGNIFICANT CHANGE UP (ref 10–45)
ANION GAP SERPL CALC-SCNC: 3 MMOL/L — LOW (ref 5–17)
AST SERPL-CCNC: 22 U/L — SIGNIFICANT CHANGE UP (ref 10–40)
BILIRUB SERPL-MCNC: 0.5 MG/DL — SIGNIFICANT CHANGE UP (ref 0.2–1.2)
BUN SERPL-MCNC: 33 MG/DL — HIGH (ref 7–23)
CALCIUM SERPL-MCNC: 9.1 MG/DL — SIGNIFICANT CHANGE UP (ref 8.4–10.5)
CHLORIDE SERPL-SCNC: 104 MMOL/L — SIGNIFICANT CHANGE UP (ref 96–108)
CO2 SERPL-SCNC: 32 MMOL/L — HIGH (ref 22–31)
CREAT SERPL-MCNC: 0.73 MG/DL — SIGNIFICANT CHANGE UP (ref 0.5–1.3)
EGFR: 78 ML/MIN/1.73M2 — SIGNIFICANT CHANGE UP
FLUAV AG NPH QL: SIGNIFICANT CHANGE UP
FLUBV AG NPH QL: SIGNIFICANT CHANGE UP
GLUCOSE SERPL-MCNC: 89 MG/DL — SIGNIFICANT CHANGE UP (ref 70–99)
POTASSIUM SERPL-MCNC: 4.4 MMOL/L — SIGNIFICANT CHANGE UP (ref 3.5–5.3)
POTASSIUM SERPL-SCNC: 4.4 MMOL/L — SIGNIFICANT CHANGE UP (ref 3.5–5.3)
PROT SERPL-MCNC: 6.4 G/DL — SIGNIFICANT CHANGE UP (ref 6–8.3)
RSV RNA NPH QL NAA+NON-PROBE: SIGNIFICANT CHANGE UP
SARS-COV-2 RNA SPEC QL NAA+PROBE: SIGNIFICANT CHANGE UP
SODIUM SERPL-SCNC: 139 MMOL/L — SIGNIFICANT CHANGE UP (ref 135–145)

## 2024-05-02 RX ADMIN — DICLOFENAC SODIUM 2 GRAM(S): 30 GEL TOPICAL at 22:04

## 2024-05-02 RX ADMIN — LIDOCAINE 1 PATCH: 4 CREAM TOPICAL at 05:18

## 2024-05-02 RX ADMIN — ATORVASTATIN CALCIUM 20 MILLIGRAM(S): 80 TABLET, FILM COATED ORAL at 22:05

## 2024-05-02 RX ADMIN — Medication 50 MICROGRAM(S): at 05:17

## 2024-05-02 RX ADMIN — SENNA PLUS 2 TABLET(S): 8.6 TABLET ORAL at 22:04

## 2024-05-02 RX ADMIN — ENOXAPARIN SODIUM 40 MILLIGRAM(S): 100 INJECTION SUBCUTANEOUS at 17:13

## 2024-05-02 RX ADMIN — Medication 1 TABLET(S): at 17:13

## 2024-05-02 RX ADMIN — Medication 1000 UNIT(S): at 11:56

## 2024-05-02 RX ADMIN — DICLOFENAC SODIUM 2 GRAM(S): 30 GEL TOPICAL at 05:18

## 2024-05-02 RX ADMIN — DICLOFENAC SODIUM 2 GRAM(S): 30 GEL TOPICAL at 17:17

## 2024-05-02 RX ADMIN — Medication 1 TABLET(S): at 05:17

## 2024-05-02 NOTE — PROGRESS NOTE ADULT - SUBJECTIVE AND OBJECTIVE BOX
HPI:  Mrs. Nita Diaz is an 89-year-old female patient with PMH of HTN, Dyslipidemia, Hypothyroidism, and cervical fusion who presented to [facility] on 4/14 with back pain of one week from onset. The pain was progressive and located in the lower back, which occurred as she was moving cases of water at some time in preceding week, but doesn't remember a definitive sudden sensation of injury. She was seen by home service and given Tramadol at home once per day, which was helping, but still had difficulty completing ADLs because of the pain. Recent history additionally remarkable for a fall in October in which she landed on her left side. She denied any new urinary or bowel retention or incontinence. and has chronic constipation and urinary incontinence with no new worsening. Has never had a DEXA and has no previous history of fractures.    In the ED, VSS. CT lumbar spine with New mild T12 compression fracture without bony retropulsion since prior CT abdomen and pelvis 10/6/2023, correlate for point tenderness. Mild to moderate spinal canal stenosis or neural foraminal narrowing L2-3 through L4-5. Seen by orthopedics who recommended conservative management with TLSO brace and calcium/vit D supplementation.     Patient was evaluated by PM&R and therapy for functional deficits, gait/ADL impairments and acute rehabilitation was recommended. Patient was medically optimized for discharge to [facility] on 4/17/24. (17 Apr 2024 13:16)    TDD: 5/4 home  ___________________________________________________________________________    SUBJECTIVE/ROS  Patient was seen and evaluated at bedside and therapy today.  Was exposed to COVID overnight events. She is asymptomatic and swab was negative. She is in no acute distress.   Notes no recurrence of left shoulder pain nor of any nausea.   GI: Last BM yesterday 5/1  : Voiding independently with no issues.   A tentative discharge date is outlined above.  Patient is motivated to continue participation on the recommended rehabilitation program.  Denies any CP, SOB, GONZALES, palpitations, fever, chills, body aches, cough, congestion, or any other symptoms at this time.   ___________________________________________________________________________    Vital Signs Last 24 Hrs  T(C): 36.8 (02 May 2024 08:27), Max: 36.8 (02 May 2024 08:27)  T(F): 98.2 (02 May 2024 08:27), Max: 98.2 (02 May 2024 08:27)  HR: 64 (02 May 2024 08:27) (64 - 66)  BP: 128/72 (02 May 2024 08:27) (116/71 - 128/72)  BP(mean): --  RR: 16 (02 May 2024 08:27) (16 - 16)  SpO2: 95% (02 May 2024 08:27) (95% - 97%)    Parameters below as of 02 May 2024 08:27  Patient On (Oxygen Delivery Method): room air    ___________________________________________________________________________    LABS      05-02    139  |  104  |  33<H>  ----------------------------<  89  4.4   |  32<H>  |  0.73    Ca    9.1      02 May 2024 06:11    TPro  6.4  /  Alb  2.6<L>  /  TBili  0.5  /  DBili  x   /  AST  22  /  ALT  36  /  AlkPhos  120  05-02    ___________________________________________________________________________    MEDICATIONS  (STANDING):  alendronate 70 milliGRAM(s) Oral <User Schedule>  atorvastatin 20 milliGRAM(s) Oral at bedtime  calcium carbonate   1250 mG (OsCal) 1 Tablet(s) Oral two times a day  cholecalciferol 1000 Unit(s) Oral daily  diclofenac sodium 1% Gel 2 Gram(s) Topical four times a day  enoxaparin Injectable 40 milliGRAM(s) SubCutaneous every 24 hours  levothyroxine 50 MICROGram(s) Oral daily  lidocaine   4% Patch 1 Patch Transdermal <User Schedule>  lidocaine   4% Patch 1 Patch Transdermal <User Schedule>  polyethylene glycol 3350 17 Gram(s) Oral daily  senna 2 Tablet(s) Oral at bedtime    MEDICATIONS  (PRN):  acetaminophen     Tablet .. 650 milliGRAM(s) Oral every 12 hours PRN Mild Pain (1 - 3)  bisacodyl Suppository 10 milliGRAM(s) Rectal daily PRN Constipation  ondansetron   Disintegrating Tablet 4 milliGRAM(s) Oral every 8 hours PRN Nausea and/or Vomiting    ___________________________________________________________________________    Gen - NAD, Comfortable  HEENT - NCAT, EOMI, MMM  Neck - Supple, No limited ROM  Pulm - CTAB, No wheeze, No rhonchi, No crackles  Cardiovascular - RRR, S1S2, + murmurs  Abdomen - Soft, NT/ND, +BS  Extremities - No clubbing, no cyanosis, no peripheral edema, no calf tenderness.   Tests- + Neer on left shoulder. Negative Pérez Has painful arc on L Sh abduction. Improving.   Neuro-     Cognitive - Awake, Alert, Oriented  to self, place, date, year, situation. Able  to follow command     Communication - Fluent, Comprehensible     Attention: Intact      Memory: Recall 3 objects immediate and 3 min later         Cranial Nerves - CN 2-12 intact     Motor -                    LEFT    UE - 4+/5                    RIGHT UE - 4+/5                    LEFT    LE - 4/5                    RIGHT LE - 4/5        Sensory - Intact to LT     Reflexes - DTR brisk over patella Hasmukh, No primitive reflexes ? increased ext tone LEs     Coordination - FTN intact     Tone - normal  Psychiatric - Mood stable, Affect WNL  Skin:  all skin intact    ___________________________________________________________________________ HPI:  Mrs. Nita Diaz is an 89-year-old female patient with PMH of HTN, Dyslipidemia, Hypothyroidism, and cervical fusion who presented to [facility] on 4/14 with back pain of one week from onset. The pain was progressive and located in the lower back, which occurred as she was moving cases of water at some time in preceding week, but doesn't remember a definitive sudden sensation of injury. She was seen by home service and given Tramadol at home once per day, which was helping, but still had difficulty completing ADLs because of the pain. Recent history additionally remarkable for a fall in October in which she landed on her left side. She denied any new urinary or bowel retention or incontinence. and has chronic constipation and urinary incontinence with no new worsening. Has never had a DEXA and has no previous history of fractures.    In the ED, VSS. CT lumbar spine with New mild T12 compression fracture without bony retropulsion since prior CT abdomen and pelvis 10/6/2023, correlate for point tenderness. Mild to moderate spinal canal stenosis or neural foraminal narrowing L2-3 through L4-5. Seen by orthopedics who recommended conservative management with TLSO brace and calcium/vit D supplementation.     Patient was evaluated by PM&R and therapy for functional deficits, gait/ADL impairments and acute rehabilitation was recommended. Patient was medically optimized for discharge to [facility] IRF on 4/17/24. (17 Apr 2024 13:16)    TDD: 5/4 home  ___________________________________________________________________________    SUBJECTIVE/ROS  Patient was seen and evaluated at bedside and therapy today.  Was exposed to COVID overnight events. She is asymptomatic and swab was negative. She is in no acute distress.   Notes no recurrence of left shoulder pain nor of any nausea.   GI: Last BM yesterday 5/1  : Voiding independently with no issues.   A tentative discharge date is outlined above.  Patient is motivated to continue participation on the recommended rehabilitation program.  Denies any CP, SOB, GONZALES, palpitations, fever, chills, body aches, cough, congestion, or any other symptoms at this time.   ___________________________________________________________________________    Vital Signs Last 24 Hrs  T(C): 36.8 (02 May 2024 08:27), Max: 36.8 (02 May 2024 08:27)  T(F): 98.2 (02 May 2024 08:27), Max: 98.2 (02 May 2024 08:27)  HR: 64 (02 May 2024 08:27) (64 - 66)  BP: 128/72 (02 May 2024 08:27) (116/71 - 128/72)  RR: 16 (02 May 2024 08:27) (16 - 16)  SpO2: 95% (02 May 2024 08:27) (95% - 97%)    ___________________________________________________________________________    LAB    05-02    139  |  104  |  33<H>  ----------------------------<  89  4.4   |  32<H>  |  0.73    Ca    9.1      02 May 2024 06:11    TPro  6.4  /  Alb  2.6<L>  /  TBili  0.5  /  DBili  x   /  AST  22  /  ALT  36  /  AlkPhos  120  05-02    LIVER FUNCTIONS - ( 02 May 2024 06:11 )  Alb: 2.6 g/dL / Pro: 6.4 g/dL / ALK PHOS: 120 U/L / ALT: 36 U/L / AST: 22 U/L / GGT: x             ___________________________________________________________________________    MEDICATIONS  (STANDING):  alendronate 70 milliGRAM(s) Oral <User Schedule>  atorvastatin 20 milliGRAM(s) Oral at bedtime  calcium carbonate   1250 mG (OsCal) 1 Tablet(s) Oral two times a day  cholecalciferol 1000 Unit(s) Oral daily  diclofenac sodium 1% Gel 2 Gram(s) Topical four times a day  enoxaparin Injectable 40 milliGRAM(s) SubCutaneous every 24 hours  levothyroxine 50 MICROGram(s) Oral daily  lidocaine   4% Patch 1 Patch Transdermal <User Schedule>  lidocaine   4% Patch 1 Patch Transdermal <User Schedule>  polyethylene glycol 3350 17 Gram(s) Oral daily  senna 2 Tablet(s) Oral at bedtime    MEDICATIONS  (PRN):  acetaminophen     Tablet .. 650 milliGRAM(s) Oral every 12 hours PRN Mild Pain (1 - 3)  bisacodyl Suppository 10 milliGRAM(s) Rectal daily PRN Constipation  ondansetron   Disintegrating Tablet 4 milliGRAM(s) Oral every 8 hours PRN Nausea and/or Vomiting    ___________________________________________________________________________    Gen - NAD, Comfortable  HEENT - NCAT, EOMI, MMM  Neck - Supple, No limited ROM  Pulm - CTAB, No wheeze, No rhonchi, No crackles  Cardiovascular - RRR, S1S2, + murmurs  Abdomen - Soft, NT/ND, +BS  Extremities - No clubbing, no cyanosis, no peripheral edema, no calf tenderness.   Tests- + Neer on left shoulder. Negative Pérez Has painful arc on L Sh abduction. Improving.   Neuro-     Cognitive - Awake, Alert, Oriented  to self, place, date, year, situation. Able  to follow command     Communication - Fluent, Comprehensible     Attention: Intact      Memory: Recall 3 objects immediate and 3 min later         Cranial Nerves - CN 2-12 intact     Motor -                    LEFT    UE - 4+/5                    RIGHT UE - 4+/5                    LEFT    LE - 4/5                    RIGHT LE - 4/5        Sensory - Intact to LT     Reflexes - DTR brisk over patella Hasmukh, No primitive reflexes ? increased ext tone LEs     Coordination - FTN intact     Tone - normal  Psychiatric - Mood stable, Affect WNL  Skin:  all skin intact    ___________________________________________________________________________

## 2024-05-02 NOTE — CHART NOTE - NSCHARTNOTEFT_GEN_A_CORE
Patient was notified of a COVID exposure in her room.  Patient denies any respiratory symptoms including cough, SOB, and congestion.  Explain plan to reswab the patient and place on droplet precautions.  Will continue to monitor for symptoms.  Patient agreeably to plan

## 2024-05-02 NOTE — PROGRESS NOTE ADULT - ASSESSMENT
Mrs. Nita Diaz is an 89-year-old female patient with PMH of HTN, Dyslipidemia, Hypothyroidism, and cervical fusion who presented to [facility] on 4/14 with back pain of one week from onset. The pain was progressive and located in the lower back, which occurred as she was moving cases of water at some time in preceding week, but doesn't remember a definitive sudden sensation of injury. She was seen by home service and given Tramadol at home once per day, which was helping, but still had difficulty completing ADLs because of the pain. Recent history additionally remarkable for a fall in October in which she landed on her left side. She denied any new urinary or bowel retention or incontinence. and has chronic constipation and urinary incontinence with no new worsening. Has never had a DEXA and has no previous history of fractures.    In the ED, VSS. CT lumbar spine with New mild T12 compression fracture without bony retropulsion since prior CT abdomen and pelvis 10/6/2023, correlate for point tenderness. Mild to moderate spinal canal stenosis or neural foraminal narrowing L2-3 through L4-5. Seen by orthopedics who recommended conservative management with TLSO brace and calcium/vit D supplementation.   Patient was evaluated by PM&R and therapy for functional deficits, gait/ADL impairments and acute rehabilitation was recommended. Patient was medically optimized for discharge to [facility] IRF on 4/17/24.      #Traumatic thoracolumbar spine injuries and thoracic spine compression fracture  - S/P Falls  - T12 compression fracture without bony retropulsion      * TLSO when OOB  - Mild to moderate lumbar spinal canal stenosis, neural foraminal narrowing L2-3 through L4-5  - Impaired ADLs and mobility  - Need for assistance with personal care  - Comprehensive Rehab Program: PT/OT, 3hours daily and 5 days weekly      * PT: Focused on improving strength, endurance, coordination, balance, functional mobility, and transfers      * OT: Focused on improving strength, fine motor skills, coordination, posture and ADLs.      #COVID exposure 5/1  - tested negative on 5/1.   - f/u swab in day 5 (5/6).     #orthostatic Hypotension  - teds/SCDs/abd binder  - OH negative, so not starting midodrine.     #KEYANA   - BUN/Cr 65/1.67 on 4/19  - IVF   - improved.       #transaminitis  - monitor  - avoid hepatoxic agents  - stable    #Osteoporosis  - calcium  BID  - Vit D3  increased to 1000iu daily.   - discussed possibility of starting bisphosphonates, patient now in agreement.   - alendronate 70mg weekly starting 4/28. Patient to be seated upright for atleast 30minutes afterwards.   - monitor for GI symptoms.   - f/u Dr. Mejia and PCP    #nausea  - zofran 4mg PRN    #HTN  - Lisinopril 10mg daily    #HLD  - Lipitor 20mg daily    #Hypothyroidism  - Synthroid 50mcg daily    #Pain management  #Left rotator cuff tendonitis  - Tylenol PRN decreased   - lidocaine patch b/l low back  - completed robaxin for LBP. Improved.   - voltaren gel QID added over the weekend. Continue until DC date.  - xray left shoulder: unremarkable.     #DVT ppx  - Lovenox, SCD, TEDs    #Bowel Regimen  - Senna, miralax daily    #Bladder management  - BS on admission, and q 8 hours (SC if > 400)  - Monitor UO    #FEN   - Diet: DASH/TLC    #Skin:  - Skin on admission: Intact    #Precaution  - Fall, Aspiration, Spinal    #GOC  CODE STATUS: FULL CODE     Outpatient Follow-up (Specialty/Name of physician):    Tuyet Montgomery  Family Medicine  [address] [place] [address]  Phone: [phone]  Fax: [phone]  Follow Up Time: 1 week    John Milton  Orthopaedic Surgery  [address] [facility], [address]  [place] [address]  Phone: [phone]  Fax: [phone]  Follow Up Time: 1 week    Rudy Ross  Otolaryngology  [address] [place] [address]  Phone: [phone]  Fax: [phone]  Follow Up Time:     Dat Langford  Cardiology  [address] [facility], [address]  [place] [address]  Phone: [phone]  Fax: [phone]  Follow Up Time: 2 weeks     Mrs. Nita Diaz is an 89-year-old female patient with PMH of HTN, Dyslipidemia, Hypothyroidism, and cervical fusion who presented to [facility] on 4/14 with back pain of one week from onset. The pain was progressive and located in the lower back, which occurred as she was moving cases of water at some time in preceding week, but doesn't remember a definitive sudden sensation of injury. She was seen by home service and given Tramadol at home once per day, which was helping, but still had difficulty completing ADLs because of the pain. Recent history additionally remarkable for a fall in October in which she landed on her left side. She denied any new urinary or bowel retention or incontinence. and has chronic constipation and urinary incontinence with no new worsening. Has never had a DEXA and has no previous history of fractures.    In the ED, VSS. CT lumbar spine with New mild T12 compression fracture without bony retropulsion since prior CT abdomen and pelvis 10/6/2023, correlate for point tenderness. Mild to moderate spinal canal stenosis or neural foraminal narrowing L2-3 through L4-5. Seen by orthopedics who recommended conservative management with TLSO brace and calcium/vit D supplementation.   Patient was evaluated by PM&R and therapy for functional deficits, gait/ADL impairments and acute rehabilitation was recommended. Patient was medically optimized for discharge to [facility] IRF on 4/17/24.      #Traumatic thoracolumbar spine injuries and thoracic spine compression fracture  - S/P Falls  - T12 compression fracture without bony retropulsion      * TLSO when OOB  - Mild to moderate lumbar spinal canal stenosis, neural foraminal narrowing L2-3 through L4-5  - Impaired ADLs and mobility  - Need for assistance with personal care  - Comprehensive Rehab Program: PT/OT, 3hours daily and 5 days weekly      * PT: Focused on improving strength, endurance, coordination, balance, functional mobility, and transfers      * OT: Focused on improving strength, fine motor skills, coordination, posture and ADLs.      #COVID exposure 5/1  - tested negative on 5/1.   - supposed to swab in day 5 but she is being DC prior.     #orthostatic Hypotension  - teds/SCDs/abd binder  - OH negative, so not starting midodrine.     #KEYANA   - BUN/Cr 65/1.67 on 4/19  - IVF   - improved.       #transaminitis  - monitor  - avoid hepatoxic agents  - stable    #Osteoporosis  - calcium  BID  - Vit D3  increased to 1000iu daily.   - discussed possibility of starting bisphosphonates, patient now in agreement.   - alendronate 70mg weekly starting 4/28. Patient to be seated upright for atleast 30minutes afterwards.   - monitor for GI symptoms.   - f/u Dr. Mejia and PCP    #nausea  - zofran 4mg PRN    #HTN  - Lisinopril 10mg daily    #HLD  - Lipitor 20mg daily    #Hypothyroidism  - Synthroid 50mcg daily    #Pain management  #Left rotator cuff tendonitis  - Tylenol PRN decreased   - lidocaine patch b/l low back  - completed robaxin for LBP. Improved.   - voltaren gel QID added over the weekend. Continue until DC date.  - xray left shoulder: unremarkable.     #DVT ppx  - Lovenox, SCD, TEDs    #Bowel Regimen  - Senna, miralax daily    #Bladder management  - BS on admission, and q 8 hours (SC if > 400)  - Monitor UO    #FEN   - Diet: DASH/TLC    #Skin:  - Skin on admission: Intact    #Precaution  - Fall, Aspiration, Spinal    #GOC  CODE STATUS: FULL CODE     Outpatient Follow-up (Specialty/Name of physician):    Tuyet Montgomery  Family Medicine  [address] [place] [address]  Phone: [phone]  Fax: [phone]  Follow Up Time: 1 week    John Milton  Orthopaedic Surgery  [address] [facility], [address]  [place] [address]  Phone: [phone]  Fax: [phone]  Follow Up Time: 1 week    Rudy Ross  Otolaryngology  [address] [place] [address]  Phone: [phone]  Fax: [phone]  Follow Up Time:     Dat Langford  Cardiology  [address] [facility], [address]  [place] [address]  Phone: [phone]  Fax: [phone]  Follow Up Time: 2 weeks     Mrs. Nita Diaz is an 89-year-old female patient with PMH of HTN, Dyslipidemia, Hypothyroidism, and cervical fusion who presented to [facility] on 4/14 with back pain of one week from onset. The pain was progressive and located in the lower back, which occurred as she was moving cases of water at some time in preceding week, but doesn't remember a definitive sudden sensation of injury. She was seen by home service and given Tramadol at home once per day, which was helping, but still had difficulty completing ADLs because of the pain. Recent history additionally remarkable for a fall in October in which she landed on her left side. She denied any new urinary or bowel retention or incontinence. and has chronic constipation and urinary incontinence with no new worsening. Has never had a DEXA and has no previous history of fractures.    In the ED, VSS. CT lumbar spine with New mild T12 compression fracture without bony retropulsion since prior CT abdomen and pelvis 10/6/2023, correlate for point tenderness. Mild to moderate spinal canal stenosis or neural foraminal narrowing L2-3 through L4-5. Seen by orthopedics who recommended conservative management with TLSO brace and calcium/vit D supplementation.   Patient was evaluated by PM&R and therapy for functional deficits, gait/ADL impairments and acute rehabilitation was recommended. Patient was medically optimized for discharge to [facility] IRF on 4/17/24.    #Traumatic thoracolumbar spine injuries and thoracic spine compression fracture  - S/P Falls  - T12 compression fracture without bony retropulsion      * TLSO when OOB  - Mild to moderate lumbar spinal canal stenosis, neural foraminal narrowing L2-3 through L4-5  - Impaired ADLs and mobility  - Need for assistance with personal care  - Comprehensive Rehab Program: PT/OT, 3hours daily and 5 days weekly      * PT: Focused on improving strength, endurance, coordination, balance, functional mobility, and transfers      * OT: Focused on improving strength, fine motor skills, coordination, posture and ADLs.      #COVID exposure 5/1  - tested negative on 5/1.   - supposed to swab in day 5 but she is being DC prior.     #orthostatic Hypotension  - teds/SCDs/abd binder  - OH negative, so not starting midodrine.     #KEYANA   - BUN/Cr 65/1.67 on 4/19  - IVF   - improved.       #transaminitis  - monitor  - avoid hepatoxic agents  - stable    #Osteoporosis  - calcium  BID  - Vit D3  increased to 1000iu daily.   - discussed possibility of starting bisphosphonates, patient now in agreement.   - alendronate 70mg weekly starting 4/28. Patient to be seated upright for atleast 30minutes afterwards.   - monitor for GI symptoms.   - f/u Dr. Mejia and PCP    #nausea  - zofran 4mg PRN    #HTN  - Lisinopril 10mg daily    #HLD  - Lipitor 20mg daily    #Hypothyroidism  - Synthroid 50mcg daily    #Pain management  #Left rotator cuff tendonitis  - Tylenol PRN decreased   - lidocaine patch b/l low back  - completed robaxin for LBP. Improved.   - voltaren gel QID added over the weekend. Continue until DC date.  - xray left shoulder: unremarkable.     #DVT ppx  - Lovenox, SCD, TEDs    #Bowel Regimen  - Senna, miralax daily    #Bladder management  - BS on admission, and q 8 hours (SC if > 400)  - Monitor UO    #FEN   - Diet: DASH/TLC    #Skin:  - Skin on admission: Intact    #Precaution  - Fall, Aspiration, Spinal    #GOC  CODE STATUS: FULL CODE     Outpatient Follow-up (Specialty/Name of physician):    Tuyet Montgomery  Family Medicine  [address] [place] [address]  Phone: [phone]  Fax: [phone]  Follow Up Time: 1 week    John Milton  Orthopaedic Surgery  [address] [facility], [address]  [place] [address]  Phone: [phone]  Fax: [phone]  Follow Up Time: 1 week    Rudy Ross  Otolaryngology  [address] [place] [address]  Phone: [phone]  Fax: [phone]  Follow Up Time:     Dat Langford  Cardiology  [address] [facility], [address]  [place] [address]  Phone: [phone]  Fax: [phone]  Follow Up Time: 2 weeks

## 2024-05-03 ENCOUNTER — TRANSCRIPTION ENCOUNTER (OUTPATIENT)
Age: 89
End: 2024-05-03

## 2024-05-03 PROCEDURE — 99232 SBSQ HOSP IP/OBS MODERATE 35: CPT

## 2024-05-03 RX ORDER — LEVOTHYROXINE SODIUM 125 MCG
1 TABLET ORAL
Qty: 30 | Refills: 0
Start: 2024-05-03 | End: 2024-06-01

## 2024-05-03 RX ORDER — ATORVASTATIN CALCIUM 80 MG/1
1 TABLET, FILM COATED ORAL
Qty: 30 | Refills: 0
Start: 2024-05-03 | End: 2024-06-01

## 2024-05-03 RX ORDER — CALCIUM CARBONATE 500(1250)
1 TABLET ORAL
Qty: 60 | Refills: 0
Start: 2024-05-03 | End: 2024-06-01

## 2024-05-03 RX ORDER — ACETAMINOPHEN 500 MG
2 TABLET ORAL
Qty: 0 | Refills: 0 | DISCHARGE
Start: 2024-05-03

## 2024-05-03 RX ORDER — ONDANSETRON 8 MG/1
1 TABLET, FILM COATED ORAL
Qty: 21 | Refills: 0
Start: 2024-05-03 | End: 2024-05-09

## 2024-05-03 RX ORDER — POLYETHYLENE GLYCOL 3350 17 G/17G
17 POWDER, FOR SOLUTION ORAL
Qty: 0 | Refills: 0 | DISCHARGE
Start: 2024-05-03

## 2024-05-03 RX ORDER — ALENDRONATE SODIUM 70 MG/1
1 TABLET ORAL
Qty: 1 | Refills: 0
Start: 2024-05-03 | End: 2024-05-06

## 2024-05-03 RX ORDER — SENNA PLUS 8.6 MG/1
2 TABLET ORAL
Qty: 0 | Refills: 0 | DISCHARGE
Start: 2024-05-03

## 2024-05-03 RX ORDER — DICLOFENAC SODIUM 30 MG/G
2 GEL TOPICAL
Qty: 1 | Refills: 1
Start: 2024-05-03 | End: 2024-05-16

## 2024-05-03 RX ORDER — CHOLECALCIFEROL (VITAMIN D3) 125 MCG
1000 CAPSULE ORAL
Qty: 30 | Refills: 1
Start: 2024-05-03 | End: 2024-07-01

## 2024-05-03 RX ORDER — LIDOCAINE 4 G/100G
1 CREAM TOPICAL
Qty: 5 | Refills: 0
Start: 2024-05-03 | End: 2024-06-01

## 2024-05-03 RX ORDER — LEVOTHYROXINE SODIUM 125 MCG
1 TABLET ORAL
Qty: 0 | Refills: 0 | DISCHARGE

## 2024-05-03 RX ORDER — FOSINOPRIL SODIUM 10 MG/1
1 TABLET ORAL
Qty: 0 | Refills: 0 | DISCHARGE

## 2024-05-03 RX ORDER — ATORVASTATIN CALCIUM 80 MG/1
1 TABLET, FILM COATED ORAL
Qty: 0 | Refills: 0 | DISCHARGE

## 2024-05-03 RX ADMIN — LIDOCAINE 1 PATCH: 4 CREAM TOPICAL at 20:10

## 2024-05-03 RX ADMIN — DICLOFENAC SODIUM 2 GRAM(S): 30 GEL TOPICAL at 12:48

## 2024-05-03 RX ADMIN — LIDOCAINE 1 PATCH: 4 CREAM TOPICAL at 07:36

## 2024-05-03 RX ADMIN — Medication 50 MICROGRAM(S): at 05:53

## 2024-05-03 RX ADMIN — ENOXAPARIN SODIUM 40 MILLIGRAM(S): 100 INJECTION SUBCUTANEOUS at 17:15

## 2024-05-03 RX ADMIN — Medication 1 TABLET(S): at 17:15

## 2024-05-03 RX ADMIN — Medication 1 TABLET(S): at 05:52

## 2024-05-03 RX ADMIN — Medication 1000 UNIT(S): at 12:48

## 2024-05-03 RX ADMIN — DICLOFENAC SODIUM 2 GRAM(S): 30 GEL TOPICAL at 05:53

## 2024-05-03 RX ADMIN — DICLOFENAC SODIUM 2 GRAM(S): 30 GEL TOPICAL at 22:53

## 2024-05-03 RX ADMIN — DICLOFENAC SODIUM 2 GRAM(S): 30 GEL TOPICAL at 17:15

## 2024-05-03 RX ADMIN — ATORVASTATIN CALCIUM 20 MILLIGRAM(S): 80 TABLET, FILM COATED ORAL at 22:53

## 2024-05-03 RX ADMIN — LIDOCAINE 1 PATCH: 4 CREAM TOPICAL at 07:35

## 2024-05-03 NOTE — PROGRESS NOTE ADULT - ASSESSMENT
88 YO female with PMH of HTN, Dyslipidemia, Hypothyroid, cervical fusion, parotid mass, pituitary adenoma s/p resection (pathology benign)  who presented to [facility] on 4/14 with back pain x 1 week that has been progressive. CT lumbar spine with new mild T12 compression fracture without bony retropulsion. Seen by ortho who recommended conservative management with TLSO brace    #Compression Fracture  - T12 compression fracture without bony retropulsion, no surgical intervention.  - Fracture likely 2/2 osteoporosis  - Activity with TLSO    #Osteoporosis  - Calcium  BID  - Fosamax 70mg qSunday  - Vit D3      #L shoulder pain secondary to suspected rotator cuff tendonitis - pain resolved  - ROM very limited due to pain  - Voltaren gel added per rehab team   - left shoulder xray showed degenerative changes  - left humerus xray negative     #Transaminitis  - LFT stable  - Prior abd US WNL   - Limit Tylenol   - c/w statin for now     #HTN  #orthostasis   - check orthostatic vital signs  - BP has been normotensive   - Pt has been off lisinopril 10mg daily d/t orthostatic hypotension     #HLD  - Lipitor 20mg daily    #Hypothyroidism  - Synthroid 50mcg daily    DVT ppx- Lovenox

## 2024-05-03 NOTE — DISCHARGE NOTE PROVIDER - NSDCMRMEDTOKEN_GEN_ALL_CORE_FT
acetaminophen 325 mg oral tablet: 2 tab(s) orally every 12 hours As needed Mild Pain (1 - 3)  atorvastatin 20 mg oral tablet: 1 tab(s) orally once a day (at bedtime)  bisacodyl 10 mg rectal suppository: 1 suppository(ies) rectal once a day as needed for Constipation  calcium carbonate 1250 mg (500 mg elemental calcium) oral tablet: 1 tab(s) orally 2 times a day  Calcium Oyster Shell 1250 mg (500 mg elemental calcium) oral tablet: 1 tab(s) orally 2 times a day  cholecalciferol oral tablet: 800 unit(s) orally once a day  diclofenac 1% topical gel: Apply topically to affected area 2 times a day as needed for  shoulder pain  Euthyrox 50 mcg (0.05 mg) oral tablet: 1 tab(s) orally once a day  Lidocare Pain Relief Patch 4% topical film: Apply topically to affected area once a day on for 12 hours. Off for 12 hours. Apply to tender point on your low back  Lipitor 20 mg oral tablet: 1 tab(s) orally once a day (at bedtime)  ondansetron 4 mg oral tablet, disintegratin tab(s) orally every 8 hours as needed for Nausea and/or Vomiting  polyethylene glycol 3350 oral powder for reconstitution: 17 gram(s) orally once a day  senna leaf extract oral tablet: 2 tab(s) orally once a day (at bedtime)  Synthroid 50 mcg (0.05 mg) oral tablet: 1 tab(s) orally once a day  Thera-D Rapid Repletion oral tablet: 1,000 international unit(s) orally once a day 1000 unit(s) orally once a day  TLSO for T12 compression fracture : Please provide patient with TLSO brace   acetaminophen 325 mg oral tablet: 2 tab(s) orally every 12 hours As needed Mild Pain (1 - 3)  alendronate 70 mg oral tablet: 1 tab(s) orally once a week Every  (May 5th, , , ). Take on empty stomach. Remain upright (not lie down) for 30 minutes.  bisacodyl 10 mg rectal suppository: 1 suppository(ies) rectal once a day as needed for Constipation  Calcium Oyster Shell 1250 mg (500 mg elemental calcium) oral tablet: 1 tab(s) orally 2 times a day  diclofenac 1% topical gel: Apply topically to affected area 2 times a day as needed for  shoulder pain  Euthyrox 50 mcg (0.05 mg) oral tablet: 1 tab(s) orally once a day  Lidocare Pain Relief Patch 4% topical film: Apply topically to affected area once a day on for 12 hours. Off for 12 hours. Apply to tender point on your low back  Lipitor 20 mg oral tablet: 1 tab(s) orally once a day (at bedtime)  ondansetron 4 mg oral tablet, disintegratin tab(s) orally every 8 hours as needed for Nausea and/or Vomiting  polyethylene glycol 3350 oral powder for reconstitution: 17 gram(s) orally once a day  senna leaf extract oral tablet: 2 tab(s) orally once a day (at bedtime)  Thera-D Rapid Repletion oral tablet: 1,000 international unit(s) orally once a day 1000 unit(s) orally once a day  TLSO for T12 compression fracture : Please provide patient with TLSO brace

## 2024-05-03 NOTE — DISCHARGE NOTE PROVIDER - CARE PROVIDER_API CALL
Tuyet Montgomery  Family Medicine  [address] [place] [address]  Phone: [phone]  Fax: [phone]  Follow Up Time: 1 week    John Milton  Orthopaedic Surgery  [address] [facility] [address]  [place] [address]  Phone: [phone]  Fax: [phone]  Follow Up Time: 1 week    Rudy Ross  Otolaryngology  [address] [place] [address]  Phone: [phone]  Fax: [phone]  Follow Up Time: 1 month    Dat Langford  Cardiology  [address] [facility], [address]  [place] [address]  Phone: [phone]  Fax: [phone]  Follow Up Time: 1 week    Yogi Quiroga  Physical/Rehab Medicine  [address] [place] [address]  Phone: [phone]  Fax: [phone]  Follow Up Time: 2 months    Ese Mejia  Rheumatology  [address] [place] [address]  Phone: [phone]  Fax: [phone]  Follow Up Time: 1 week

## 2024-05-03 NOTE — PROGRESS NOTE ADULT - SUBJECTIVE AND OBJECTIVE BOX
Patient is a 89y old  Female who presents with a chief complaint of Traumatic thoracolumbar spine injuries and thoracic spine compression fracture (02 May 2024 11:34)    Patient seen and examined at bedside. No acute overnight events. Denies pain/discomfort.     ALLERGIES:  No Known Allergies    MEDICATIONS  (STANDING):  alendronate 70 milliGRAM(s) Oral <User Schedule>  atorvastatin 20 milliGRAM(s) Oral at bedtime  calcium carbonate   1250 mG (OsCal) 1 Tablet(s) Oral two times a day  cholecalciferol 1000 Unit(s) Oral daily  diclofenac sodium 1% Gel 2 Gram(s) Topical four times a day  enoxaparin Injectable 40 milliGRAM(s) SubCutaneous every 24 hours  levothyroxine 50 MICROGram(s) Oral daily  lidocaine   4% Patch 1 Patch Transdermal <User Schedule>  lidocaine   4% Patch 1 Patch Transdermal <User Schedule>  polyethylene glycol 3350 17 Gram(s) Oral daily  senna 2 Tablet(s) Oral at bedtime    MEDICATIONS  (PRN):  acetaminophen     Tablet .. 650 milliGRAM(s) Oral every 12 hours PRN Mild Pain (1 - 3)  bisacodyl Suppository 10 milliGRAM(s) Rectal daily PRN Constipation  ondansetron   Disintegrating Tablet 4 milliGRAM(s) Oral every 8 hours PRN Nausea and/or Vomiting    Vital Signs Last 24 Hrs  T(F): 97.8 (03 May 2024 07:39), Max: 97.9 (02 May 2024 22:13)  HR: 75 (03 May 2024 07:39) (64 - 75)  BP: 130/75 (03 May 2024 07:39) (107/68 - 130/75)  RR: 16 (03 May 2024 07:39) (16 - 16)  SpO2: 96% (03 May 2024 07:39) (96% - 96%)  I&O's Summary    PHYSICAL EXAM:  General: NAD, awake, alert   ENT: MMM, no tonsilar exudate  Neck: Supple, No JVD  Lungs: Clear to auscultation bilaterally, no wheezes. Good air entry bilaterally   Cardio: RRR, S1/S2, No murmurs  Abdomen: Soft, Nontender, Nondistended; Bowel sounds present  Extremities: No calf tenderness, No pitting edema    LABS:      05-02    139  |  104  |  33  ----------------------------<  89  4.4   |  32  |  0.73    Ca    9.1      02 May 2024 06:11    TPro  6.4  /  Alb  2.6  /  TBili  0.5  /  DBili  x   /  AST  22  /  ALT  36  /  AlkPhos  120  05-02     04-19 Chol 137 mg/dL LDL -- HDL 52 mg/dL Trig 68 mg/dL    Urinalysis Basic - ( 02 May 2024 06:11 )    Color: x / Appearance: x / SG: x / pH: x  Gluc: 89 mg/dL / Ketone: x  / Bili: x / Urobili: x   Blood: x / Protein: x / Nitrite: x   Leuk Esterase: x / RBC: x / WBC x   Sq Epi: x / Non Sq Epi: x / Bacteria: x    RADIOLOGY & ADDITIONAL TESTS:     Care Discussed with Consultants/Other Providers:

## 2024-05-03 NOTE — DISCHARGE NOTE PROVIDER - HOSPITAL COURSE
HPI:  Mrs. Nita Diaz is an 89-year-old female patient with PMH of HTN, Dyslipidemia, Hypothyroidism, and cervical fusion who presented to [facility] on 4/14 with back pain of one week from onset. The pain was progressive and located in the lower back, which occurred as she was moving cases of water at some time in preceding week, but doesn't remember a definitive sudden sensation of injury. She was seen by home service and given Tramadol at home once per day, which was helping, but still had difficulty completing ADLs because of the pain. Recent history additionally remarkable for a fall in October in which she landed on her left side. She denied any new urinary or bowel retention or incontinence. and has chronic constipation and urinary incontinence with no new worsening. Has never had a DEXA and has no previous history of fractures.    In the ED, VSS. CT lumbar spine with New mild T12 compression fracture without bony retropulsion since prior CT abdomen and pelvis 10/6/2023, correlate for point tenderness. Mild to moderate spinal canal stenosis or neural foraminal narrowing L2-3 through L4-5. Seen by orthopedics who recommended conservative management with TLSO brace and calcium/vit D supplementation.     Patient was evaluated by PM&R and therapy for functional deficits, gait/ADL impairments and acute rehabilitation was recommended. Patient was medically optimized for discharge to [facility] IRF on 4/17/24.     Pt was stable upon rehab admission to  Inpatient [facility] Facility. Admitted with gait instability, ADL, and functional impairments.     Rehab Course significant for hypotension after therapy and KEYANA. She was treated with IVF which improved her renal function and BP. Patent also was started on alendronate 70mg weekly every Sunday. First dose 4/28th. She was nauseous for 1-2 days, which needs to be monitored outpatient follow up with PCP and Dr. Mejia. Had low back pain that improved with lidocaine patches. Had left rotator cuff pain that improved with Voltaren gel QID. Exposed to covid-19 on 5/1 but tested negative. All other medical co-morbidities were stable. Pt tolerated course of inpatient PT/OT/SLP rehab with significant functional improvements and met rehab goals prior to discharge.    Pt was medically cleared on 5/3 for discharge to home.     Pt will follow up with the following:  Dr. Mejia  Rheumatologist  for Osteoporosis Study    Tuyet Montgomery  Family Medicine  [address] [place] [address]  Phone: [phone]  Fax: [phone]  Follow Up Time: 1 week    John Milton  Orthopaedic Surgery  [address] [facility], [address]  [place] [address]  Phone: [phone]  Fax: [phone]  Follow Up Time: 1 week    Rudy Ross  Otolaryngology  [address] [place] [address]  Phone: [phone]  Fax: [phone]  Follow Up Time:     Dat Langford  Cardiology  [address] [facility], [address]  [place] [address]  Phone: [phone]  Fax: [phone]  Follow Up Time: 2 weeks

## 2024-05-03 NOTE — DISCHARGE NOTE PROVIDER - NSDCACTIVITY_GEN_ALL_CORE
Do not drive or operate machinery/Do not make important decisions/No heavy lifting/straining/Follow Instructions Provided by your Surgical Team/Activity as tolerated

## 2024-05-03 NOTE — DISCHARGE NOTE NURSING/CASE MANAGEMENT/SOCIAL WORK - PATIENT PORTAL LINK FT
You can access the FollowMyHealth Patient Portal offered by [facility] by registering at the following website: [email or web address]. By joining [facility]’s FollowMyHealth portal, you will also be able to view your health information using other applications (apps) compatible with our system.

## 2024-05-03 NOTE — PROGRESS NOTE ADULT - ASSESSMENT
Mrs. Nita Diaz is an 89-year-old female patient with PMH of HTN, Dyslipidemia, Hypothyroidism, and cervical fusion who presented to [facility] on 4/14 with back pain of one week from onset. The pain was progressive and located in the lower back, which occurred as she was moving cases of water at some time in preceding week, but doesn't remember a definitive sudden sensation of injury. She was seen by home service and given Tramadol at home once per day, which was helping, but still had difficulty completing ADLs because of the pain. Recent history additionally remarkable for a fall in October in which she landed on her left side. She denied any new urinary or bowel retention or incontinence. and has chronic constipation and urinary incontinence with no new worsening. Has never had a DEXA and has no previous history of fractures.    In the ED, VSS. CT lumbar spine with New mild T12 compression fracture without bony retropulsion since prior CT abdomen and pelvis 10/6/2023, correlate for point tenderness. Mild to moderate spinal canal stenosis or neural foraminal narrowing L2-3 through L4-5. Seen by orthopedics who recommended conservative management with TLSO brace and calcium/vit D supplementation.   Patient was evaluated by PM&R and therapy for functional deficits, gait/ADL impairments and acute rehabilitation was recommended. Patient was medically optimized for discharge to [facility] IRF on 4/17/24.    #Traumatic thoracolumbar spine injuries and thoracic spine compression fracture  - S/P Falls  - T12 compression fracture without bony retropulsion      * TLSO when OOB  - Mild to moderate lumbar spinal canal stenosis, neural foraminal narrowing L2-3 through L4-5  - Impaired ADLs and mobility  - Need for assistance with personal care  - Completes Comprehensive Rehab Program today  - Discharge home tomorrow    #COVID exposure 5/1  - tested negative on 5/1.   - supposed to swab in day 5 but she is being DC prior.     #orthostatic Hypotension  - teds/SCDs/abd binder  - OH negative, so not starting midodrine.     #KEYANA   - BUN/Cr 65/1.67 on 4/19  - IVF   - improved.       #transaminitis  - monitor  - avoid hepatoxic agents  - stable    #Osteoporosis  - calcium  BID  - Vit D3  increased to 1000iu daily.   - discussed possibility of starting bisphosphonates, patient now in agreement.   - alendronate 70mg weekly starting 4/28. Patient to be seated upright for atleast 30minutes afterwards.   - monitor for GI symptoms.   - f/u Dr. Mejia and PCP    #nausea  - zofran 4mg PRN    #HTN  - Lisinopril 10mg daily    #HLD  - Lipitor 20mg daily    #Hypothyroidism  - Synthroid 50mcg daily    #Pain management  #Left rotator cuff tendonitis  - Tylenol PRN decreased   - lidocaine patch b/l low back  - completed robaxin for LBP. Improved.   - voltaren gel QID added over the weekend. Continue until DC date.  - xray left shoulder: unremarkable.     #DVT ppx  - Lovenox, SCD, TEDs    #Bowel Regimen  - Senna, miralax daily    #Bladder management  - BS on admission, and q 8 hours (SC if > 400)  - Monitor UO    #FEN   - Diet: DASH/TLC    #Skin:  - Skin on admission: Intact    #Precaution  - Fall, Aspiration, Spinal    #GOC  CODE STATUS: FULL CODE     Outpatient Follow-up (Specialty/Name of physician):    Tuyet Montgomery  Family Medicine  [address] [place] [address]  Phone: [phone]  Fax: [phone]  Follow Up Time: 1 week    John Milton  Orthopaedic Surgery  [address], [address]  [place] [address]  Phone: [phone]  Fax: [phone]  Follow Up Time: 1 week    Rudy Ross  Otolaryngology  [address] [place] [address]  Phone: [phone]  Fax: [phone]  Follow Up Time:     Dat Langford  Cardiology  [address], [address]  [place] [address]  Phone: [phone]  Fax: [phone]  Follow Up Time: 2 weeks

## 2024-05-03 NOTE — PROGRESS NOTE ADULT - SUBJECTIVE AND OBJECTIVE BOX
HPI:  Mrs. Nita Diaz is an 89-year-old female patient with PMH of HTN, Dyslipidemia, Hypothyroidism, and cervical fusion who presented to [facility] on 4/14 with back pain of one week from onset. The pain was progressive and located in the lower back, which occurred as she was moving cases of water at some time in preceding week, but doesn't remember a definitive sudden sensation of injury. She was seen by home service and given Tramadol at home once per day, which was helping, but still had difficulty completing ADLs because of the pain. Recent history additionally remarkable for a fall in October in which she landed on her left side. She denied any new urinary or bowel retention or incontinence. and has chronic constipation and urinary incontinence with no new worsening. Has never had a DEXA and has no previous history of fractures.    In the ED, VSS. CT lumbar spine with New mild T12 compression fracture without bony retropulsion since prior CT abdomen and pelvis 10/6/2023, correlate for point tenderness. Mild to moderate spinal canal stenosis or neural foraminal narrowing L2-3 through L4-5. Seen by orthopedics who recommended conservative management with TLSO brace and calcium/vit D supplementation.     Patient was evaluated by PM&R and therapy for functional deficits, gait/ADL impairments and acute rehabilitation was recommended. Patient was medically optimized for discharge to [facility] IRF on 4/17/24. (17 Apr 2024 13:16)    TDD: 5/4 home  ___________________________________________________________________________    SUBJECTIVE/ROS  Patient was seen and evaluated at bedside and therapy today.  Continues on exposure to COVID isolation protocol.  Notes no recurrence of left shoulder pain nor of any nausea.   GI: Last BM yesterday 5/2  : Voiding independently with no issues.   Scheduled for discharge home tomorrow.  Patient is motivated to continue participation on the recommended rehabilitation program as outpatient.  Denies any CP, SOB, GONZALES, palpitations, fever, chills, body aches, cough, congestion, or any other symptoms at this time.   ___________________________________________________________________________    Vital Signs Last 24 Hrs  T(C): 36.6 (03 May 2024 07:39), Max: 36.6 (02 May 2024 22:13)  T(F): 97.8 (03 May 2024 07:39), Max: 97.9 (02 May 2024 22:13)  HR: 75 (03 May 2024 07:39) (64 - 75)  BP: 130/75 (03 May 2024 07:39) (107/68 - 130/75)  RR: 16 (03 May 2024 07:39) (16 - 16)  SpO2: 96% (03 May 2024 07:39) (96% - 96%)    ___________________________________________________________________________    LAB    05-02    139  |  104  |  33<H>  ----------------------------<  89  4.4   |  32<H>  |  0.73    Ca    9.1      02 May 2024 06:11    TPro  6.4  /  Alb  2.6<L>  /  TBili  0.5  /  DBili  x   /  AST  22  /  ALT  36  /  AlkPhos  120  05-02    LIVER FUNCTIONS - ( 02 May 2024 06:11 )  Alb: 2.6 g/dL / Pro: 6.4 g/dL / ALK PHOS: 120 U/L / ALT: 36 U/L / AST: 22 U/L / GGT: x           ___________________________________________________________________________    MEDICATIONS  (STANDING):  alendronate 70 milliGRAM(s) Oral <User Schedule>  atorvastatin 20 milliGRAM(s) Oral at bedtime  calcium carbonate   1250 mG (OsCal) 1 Tablet(s) Oral two times a day  cholecalciferol 1000 Unit(s) Oral daily  diclofenac sodium 1% Gel 2 Gram(s) Topical four times a day  enoxaparin Injectable 40 milliGRAM(s) SubCutaneous every 24 hours  levothyroxine 50 MICROGram(s) Oral daily  lidocaine   4% Patch 1 Patch Transdermal <User Schedule>  lidocaine   4% Patch 1 Patch Transdermal <User Schedule>  polyethylene glycol 3350 17 Gram(s) Oral daily  senna 2 Tablet(s) Oral at bedtime    MEDICATIONS  (PRN):  acetaminophen     Tablet .. 650 milliGRAM(s) Oral every 12 hours PRN Mild Pain (1 - 3)  bisacodyl Suppository 10 milliGRAM(s) Rectal daily PRN Constipation  ondansetron   Disintegrating Tablet 4 milliGRAM(s) Oral every 8 hours PRN Nausea and/or Vomiting    ___________________________________________________________________________    Gen - NAD, Comfortable  HEENT - NCAT, EOMI, MMM  Neck - Supple, No limited ROM  Pulm - CTAB, No wheeze, No rhonchi, No crackles  Cardiovascular - RRR, S1S2, + murmurs  Abdomen - Soft, NT/ND, +BS  Extremities - No clubbing, no cyanosis, no peripheral edema, no calf tenderness.   Tests- + Neer on left shoulder. Negative Pérez Has painful arc on L Sh abduction. Improving.   Neuro-     Cognitive - Awake, Alert, Oriented  to self, place, date, year, situation. Able  to follow command     Communication - Fluent, Comprehensible     Attention: Intact      Memory: Recall 3 objects immediate and 3 min later         Cranial Nerves - CN 2-12 intact     Motor -                    LEFT    UE - 4+/5                    RIGHT UE - 4+/5                    LEFT    LE - 4/5                    RIGHT LE - 4/5        Sensory - Intact to LT     Reflexes - DTR brisk over patella Hasmukh, No primitive reflexes ? increased ext tone LEs     Coordination - FTN intact     Tone - normal  Psychiatric - Mood stable, Affect WNL  Skin:  all skin intact    ___________________________________________________________________________

## 2024-05-03 NOTE — DISCHARGE NOTE PROVIDER - PROVIDER TOKENS
PROVIDER:[TOKEN:[[id number]],FOLLOWUP:[1 week]],PROVIDER:[TOKEN:[[id number]],FOLLOWUP:[1 week]],PROVIDER:[TOKEN:[[id number]],FOLLOWUP:[1 month]],PROVIDER:[TOKEN:[[id number]],FOLLOWUP:[1 week]],PROVIDER:[TOKEN:[[id number]],FOLLOWUP:[2 months]],PROVIDER:[TOKEN:[[id number]],FOLLOWUP:[1 week]] Patient unable to complete

## 2024-05-03 NOTE — DISCHARGE NOTE PROVIDER - NSDCCPCAREPLAN_GEN_ALL_CORE_FT
PRINCIPAL DISCHARGE DIAGNOSIS  Diagnosis: Thoracic compression fracture  Assessment and Plan of Treatment: Please follow up with your surgeon. Wear your TLSO brace for comfort as needed. Take tylenol for pain as needed. Use lidocaine patches on your low back daily if you have pain.      SECONDARY DISCHARGE DIAGNOSES  Diagnosis: Exposure to COVID-19 virus  Assessment and Plan of Treatment: You were exposed on 5/1 and tested negative. If you become symptomatic (fevers, chills, SOB, etc) then please go to the ER.    Diagnosis: Low blood pressure  Assessment and Plan of Treatment: Your blood pressure was low and we  managed it with oral and IV hydration. Please stay hydrated. If you start to have diarrhea then visit your PCP    Diagnosis: KEYANA (acute kidney injury)  Assessment and Plan of Treatment: Your low blood pressure caused an injury to your kidney, which improved with IV fluids.    Diagnosis: Transaminitis  Assessment and Plan of Treatment: Follow up with your PCP to monitor. Caution with daily tylenol intake.    Diagnosis: Osteoporosis  Assessment and Plan of Treatment: You were starting on alendronate 70mg weekly to be taken every Sunday on an empty stomach in the morning. Please be seated upright for atleast 30minutes afterwards (do not lie down). Your First dose was 4/28th.   Take calcium and vit-D as prescribed.   Follow up with Dr. Mejia and your PCP to renew your alendronate prescription.   ***If you become nauseous or having GI upset then please stop taking it and follow up with Dr. Mejia or your PCP****    Diagnosis: HTN (hypertension)  Assessment and Plan of Treatment: f/u with you PCP    Diagnosis: HLD (hyperlipidemia)  Assessment and Plan of Treatment: Lipitor daily. F/u with you PCP    Diagnosis: Hypothyroid  Assessment and Plan of Treatment: SYnthroid as prescribed.    Diagnosis: Rotator cuff tendinitis, left  Assessment and Plan of Treatment: Use voltaren gel on your left shoulder as needed twice a day for pain

## 2024-05-04 VITALS
SYSTOLIC BLOOD PRESSURE: 113 MMHG | DIASTOLIC BLOOD PRESSURE: 72 MMHG | OXYGEN SATURATION: 97 % | HEART RATE: 60 BPM | RESPIRATION RATE: 16 BRPM | TEMPERATURE: 97 F

## 2024-05-04 PROCEDURE — 99232 SBSQ HOSP IP/OBS MODERATE 35: CPT

## 2024-05-04 RX ADMIN — Medication 1000 UNIT(S): at 12:15

## 2024-05-04 RX ADMIN — Medication 50 MICROGRAM(S): at 06:36

## 2024-05-04 RX ADMIN — LIDOCAINE 1 PATCH: 4 CREAM TOPICAL at 06:37

## 2024-05-04 RX ADMIN — DICLOFENAC SODIUM 2 GRAM(S): 30 GEL TOPICAL at 12:15

## 2024-05-04 RX ADMIN — Medication 1 TABLET(S): at 06:35

## 2024-05-04 RX ADMIN — DICLOFENAC SODIUM 2 GRAM(S): 30 GEL TOPICAL at 06:38

## 2024-05-04 NOTE — PROGRESS NOTE ADULT - SUBJECTIVE AND OBJECTIVE BOX
Patient is a 89y old  Female who presents with a chief complaint of Traumatic thoracolumbar spine injuries and thoracic spine compression fracture (03 May 2024 11:39)    Patient seen and examined at bedside. No acute overnight events. No complaints at time of evaluation. Excited for discharge today     ALLERGIES:  No Known Allergies    MEDICATIONS  (STANDING):  alendronate 70 milliGRAM(s) Oral <User Schedule>  atorvastatin 20 milliGRAM(s) Oral at bedtime  calcium carbonate   1250 mG (OsCal) 1 Tablet(s) Oral two times a day  cholecalciferol 1000 Unit(s) Oral daily  diclofenac sodium 1% Gel 2 Gram(s) Topical four times a day  enoxaparin Injectable 40 milliGRAM(s) SubCutaneous every 24 hours  levothyroxine 50 MICROGram(s) Oral daily  lidocaine   4% Patch 1 Patch Transdermal <User Schedule>  lidocaine   4% Patch 1 Patch Transdermal <User Schedule>  polyethylene glycol 3350 17 Gram(s) Oral daily  senna 2 Tablet(s) Oral at bedtime    MEDICATIONS  (PRN):  acetaminophen     Tablet .. 650 milliGRAM(s) Oral every 12 hours PRN Mild Pain (1 - 3)  bisacodyl Suppository 10 milliGRAM(s) Rectal daily PRN Constipation  ondansetron   Disintegrating Tablet 4 milliGRAM(s) Oral every 8 hours PRN Nausea and/or Vomiting    Vital Signs Last 24 Hrs  T(F): 97.2 (04 May 2024 08:17), Max: 97.8 (03 May 2024 22:59)  HR: 60 (04 May 2024 08:17) (57 - 60)  BP: 113/72 (04 May 2024 08:17) (113/72 - 119/63)  RR: 16 (04 May 2024 08:17) (16 - 16)  SpO2: 97% (04 May 2024 08:17) (96% - 97%)  I&O's Summary    PHYSICAL EXAM:  General: NAD, A/O x 3  ENT: MMM, no tonsilar exudate  Neck: Supple, No JVD  Lungs: Clear to auscultation bilaterally, no wheezes. Good air entry bilaterally   Cardio: RRR, S1/S2, No murmurs  Abdomen: Soft, Nontender, Nondistended; Bowel sounds present  Extremities: No calf tenderness, No pitting edema    LABS:      05-02    139  |  104  |  33  ----------------------------<  89  4.4   |  32  |  0.73    Ca    9.1      02 May 2024 06:11    TPro  6.4  /  Alb  2.6  /  TBili  0.5  /  DBili  x   /  AST  22  /  ALT  36  /  AlkPhos  120  05-02     04-19 Chol 137 mg/dL LDL -- HDL 52 mg/dL Trig 68 mg/dL    Urinalysis Basic - ( 02 May 2024 06:11 )    Color: x / Appearance: x / SG: x / pH: x  Gluc: 89 mg/dL / Ketone: x  / Bili: x / Urobili: x   Blood: x / Protein: x / Nitrite: x   Leuk Esterase: x / RBC: x / WBC x   Sq Epi: x / Non Sq Epi: x / Bacteria: x    RADIOLOGY & ADDITIONAL TESTS:     Care Discussed with Consultants/Other Providers:

## 2024-05-04 NOTE — PROGRESS NOTE ADULT - ASSESSMENT
Imp: Patient with diagnosis of     Traumatic thoracolumbar spine injuries and thoracic spine compression fracture     admitted for comprehensive acute rehabilitation.    Plan:  - Continue PT/OT/SLP  - DVT prophylaxis  - Skin- Turn q2h, check skin daily  - Continue current medications; patient medically stable.   -Active issues-   - Patient is stable to discharge from acute in patient rehabilitation program  - vital signs and clinical condition stable without signs of acute viral illness  - patient advised to adhere to planned close outpatient specialist follow up

## 2024-05-04 NOTE — PROGRESS NOTE ADULT - PROVIDER SPECIALTY LIST ADULT
Hospitalist
Physiatry
Rehab Medicine
Hospitalist
Physiatry
Physiatry
Rehab Medicine
Rehab Medicine
Hospitalist
Physiatry
Rehab Medicine
Rehab Medicine
Hospitalist
Hospitalist
Physiatry
Physiatry

## 2024-05-04 NOTE — PROGRESS NOTE ADULT - ASSESSMENT
88 YO female with PMH of HTN, Dyslipidemia, Hypothyroid, cervical fusion, parotid mass, pituitary adenoma s/p resection (pathology benign)  who presented to [facility] on 4/14 with back pain x 1 week that has been progressive. CT lumbar spine with new mild T12 compression fracture without bony retropulsion. Seen by ortho who recommended conservative management with TLSO brace    #Compression Fracture  - T12 compression fracture without bony retropulsion, no surgical intervention.  - Fracture likely 2/2 osteoporosis  - Activity with TLSO    #Osteoporosis  - Calcium  BID  - Fosamax 70mg qSunday  - Vit D3      #L shoulder pain secondary to suspected rotator cuff tendonitis - pain resolved  - ROM very limited due to pain  - Voltaren gel added per rehab team   - left shoulder xray showed degenerative changes  - left humerus xray negative     #Transaminitis  - LFT stable  - Prior abd US WNL   - Limit Tylenol   - c/w statin for now     #HTN  #orthostasis   - check orthostatic vital signs  - BP has been normotensive   - Pt has been off lisinopril 10mg daily d/t orthostatic hypotension     #HLD  - Lipitor 20mg daily    #Hypothyroidism  - Synthroid 50mcg daily    DVT ppx- Lovenox

## 2024-05-04 NOTE — PROGRESS NOTE ADULT - SUBJECTIVE AND OBJECTIVE BOX
Cc: Gait dysfunction    HPI: Patient with no new medical issues overnight.  Pain controlled, no chest pain, no N/V, no Fevers/Chills.   No other new ROS  Has been tolerating rehabilitation program.    acetaminophen     Tablet .. 650 milliGRAM(s) Oral every 12 hours PRN  alendronate 70 milliGRAM(s) Oral <User Schedule>  atorvastatin 20 milliGRAM(s) Oral at bedtime  bisacodyl Suppository 10 milliGRAM(s) Rectal daily PRN  calcium carbonate   1250 mG (OsCal) 1 Tablet(s) Oral two times a day  cholecalciferol 1000 Unit(s) Oral daily  diclofenac sodium 1% Gel 2 Gram(s) Topical four times a day  enoxaparin Injectable 40 milliGRAM(s) SubCutaneous every 24 hours  levothyroxine 50 MICROGram(s) Oral daily  lidocaine   4% Patch 1 Patch Transdermal <User Schedule>  lidocaine   4% Patch 1 Patch Transdermal <User Schedule>  ondansetron   Disintegrating Tablet 4 milliGRAM(s) Oral every 8 hours PRN  polyethylene glycol 3350 17 Gram(s) Oral daily  senna 2 Tablet(s) Oral at bedtime      T(C): 36.2 (05-04-24 @ 08:17), Max: 36.6 (05-03-24 @ 22:59)  HR: 60 (05-04-24 @ 08:17) (57 - 60)  BP: 113/72 (05-04-24 @ 08:17) (113/72 - 119/63)  RR: 16 (05-04-24 @ 08:17) (16 - 16)  SpO2: 97% (05-04-24 @ 08:17) (96% - 97%)    In NAD  HEENT- EOMI  Heart- RRR, S1S2  Lungs- CTA bl.  Abd- + BS, NT  Ext- No calf pain  Neuro- Exam unchanged

## 2024-05-04 NOTE — PROGRESS NOTE ADULT - REASON FOR ADMISSION
Traumatic thoracolumbar spine injuries and thoracic spine compression fracture

## 2024-05-05 ENCOUNTER — NON-APPOINTMENT (OUTPATIENT)
Age: 89
End: 2024-05-05

## 2024-05-06 ENCOUNTER — APPOINTMENT (OUTPATIENT)
Dept: HOME HEALTH SERVICES | Facility: HOME HEALTH | Age: 89
End: 2024-05-06

## 2024-05-06 RX ORDER — MECOBALAMIN 1000 MCG
1000 TABLET,DISINTEGRATING SUBLINGUAL DAILY
Qty: 90 | Refills: 3 | Status: ACTIVE | COMMUNITY
Start: 2024-01-14

## 2024-05-06 RX ORDER — LEVOTHYROXINE SODIUM 50 UG/1
50 CAPSULE ORAL DAILY
Qty: 90 | Refills: 1 | Status: ACTIVE | COMMUNITY
Start: 2021-11-09

## 2024-05-06 RX ORDER — FOSINOPRIL SODIUM 10 MG/1
10 TABLET ORAL
Qty: 90 | Refills: 3 | Status: ACTIVE | COMMUNITY
Start: 2021-10-19

## 2024-05-06 RX ORDER — ADHESIVE TAPE 3"X 2.3 YD
50 MCG TAPE, NON-MEDICATED TOPICAL
Qty: 90 | Refills: 3 | Status: ACTIVE | COMMUNITY
Start: 2024-01-14

## 2024-05-06 RX ORDER — DICLOFENAC SODIUM 1% 10 MG/G
1 GEL TOPICAL
Refills: 0 | Status: ACTIVE | COMMUNITY
Start: 2024-05-06

## 2024-05-06 RX ORDER — LIDOCAINE 40 MG/G
4 PATCH TOPICAL
Refills: 0 | Status: ACTIVE | COMMUNITY
Start: 2024-05-06

## 2024-05-06 RX ORDER — ALENDRONATE SODIUM 70 MG/1
70 TABLET ORAL
Refills: 0 | Status: ACTIVE | COMMUNITY
Start: 2024-05-06

## 2024-05-06 RX ORDER — ONDANSETRON 4 MG/1
4 TABLET, ORALLY DISINTEGRATING ORAL
Refills: 0 | Status: ACTIVE | COMMUNITY
Start: 2024-05-06

## 2024-05-06 RX ORDER — CHOLECALCIFEROL (VITAMIN D3) 1250 MCG
1.25 MG CAPSULE ORAL
Qty: 8 | Refills: 2 | Status: ACTIVE | COMMUNITY
Start: 2024-01-21

## 2024-05-06 RX ORDER — LOSARTAN POTASSIUM 50 MG/1
50 TABLET, FILM COATED ORAL
Qty: 90 | Refills: 0 | Status: ACTIVE | COMMUNITY
Start: 2018-03-09

## 2024-05-06 RX ORDER — ATORVASTATIN CALCIUM 20 MG/1
20 TABLET, FILM COATED ORAL
Qty: 90 | Refills: 3 | Status: ACTIVE | COMMUNITY
Start: 2022-01-21

## 2024-05-07 ENCOUNTER — NON-APPOINTMENT (OUTPATIENT)
Age: 89
End: 2024-05-07

## 2024-05-08 ENCOUNTER — NON-APPOINTMENT (OUTPATIENT)
Age: 89
End: 2024-05-08

## 2024-05-09 ENCOUNTER — APPOINTMENT (OUTPATIENT)
Dept: HOME HEALTH SERVICES | Facility: HOME HEALTH | Age: 89
End: 2024-05-09
Payer: MEDICARE

## 2024-05-09 VITALS
HEART RATE: 56 BPM | SYSTOLIC BLOOD PRESSURE: 110 MMHG | OXYGEN SATURATION: 98 % | TEMPERATURE: 97.6 F | DIASTOLIC BLOOD PRESSURE: 70 MMHG

## 2024-05-09 DIAGNOSIS — R26.89 OTHER ABNORMALITIES OF GAIT AND MOBILITY: ICD-10-CM

## 2024-05-09 DIAGNOSIS — I70.0 ATHEROSCLEROSIS OF AORTA: ICD-10-CM

## 2024-05-09 DIAGNOSIS — S22.000G WEDGE COMPRESSION FRACTURE OF UNSPECIFIED THORACIC VERTEBRA, SUBSEQUENT ENCOUNTER FOR FRACTURE WITH DELAYED HEALING: ICD-10-CM

## 2024-05-09 DIAGNOSIS — S22.080A WEDGE COMPRESSION FRACTURE OF T11-T12 VERTEBRA, INITIAL ENCOUNTER FOR CLOSED FRACTURE: ICD-10-CM

## 2024-05-09 DIAGNOSIS — S22.080S WEDGE COMPRESSION FRACTURE OF T11-T12 VERTEBRA, SEQUELA: ICD-10-CM

## 2024-05-09 DIAGNOSIS — M80.00XD AGE-RELATED OSTEOPOROSIS WITH CURRENT PATHOLOGICAL FRACTURE, UNSPECIFIED SITE, SUBSEQUENT ENCOUNTER FOR FRACTURE WITH ROUTINE HEALING: ICD-10-CM

## 2024-05-09 PROCEDURE — 99349 HOME/RES VST EST MOD MDM 40: CPT

## 2024-05-09 RX ORDER — CALCITONIN SALMON 200 [IU]/1
200 SPRAY, METERED NASAL
Qty: 1 | Refills: 3 | Status: ACTIVE | COMMUNITY
Start: 2024-05-09 | End: 1900-01-01

## 2024-05-09 RX ORDER — TRAMADOL HYDROCHLORIDE AND ACETAMINOPHEN 37.5; 325 MG/1; MG/1
37.5-325 TABLET, FILM COATED ORAL
Qty: 14 | Refills: 0 | Status: ACTIVE | COMMUNITY
Start: 2024-05-09 | End: 1900-01-01

## 2024-05-09 NOTE — REVIEW OF SYSTEMS
[Vision Problems] : vision problems [Constipation] : constipation [Incontinence] : incontinence [Joint Stiffness] : joint stiffness [Joint Swelling] : joint swelling [Nail Changes] : nail changes [Unsteady Walking] : ataxia [Negative] : Heme/Lymph [Shortness Of Breath] : no shortness of breath [Abdominal Pain] : no abdominal pain [Muscle Weakness] : no muscle weakness [Memory Loss] : no memory loss [FreeTextEntry3] : macular degeneration

## 2024-05-09 NOTE — HISTORY OF PRESENT ILLNESS
[Patient] : patient [FreeTextEntry1] : gait instablity  [FreeTextEntry2] : patient  is seen today for a follow  up    unchanged chronic  condition  interval events  reviewed and addressed  discharge home from [facility]. after  04/18/2024 for traumatic thoracolumbar spine injuries and thoracic spine compression fracture and discharged home on 05/04/2024. Discharge medications from the discharge summary were reviewed and reconciled . Medication changes: started on Alendronate 70mg weekly, Ondansetron 4mg, Diclofenac 1% gel, and Lidocaine 4%. patient is doing well  but sitll have back pains   getting PT  2 a week   denies  any chest pains shortness of breath  weight changes     good appetite  Past medical history include   HTN falls   patient  still follows with   specialists  cardiologist  last hospitalization  diet regular  appetite   good   dysphagia none  Weight stable  constipation episodic   incontinence none  pressure/bed sores none  Ambulates with rolling walker  falls yes none recently  Behavior good  Mood  ok   memory  good   sleep   poor  melatonin did  not work  Advised on good sleeping habits  Use of  tryptophan sleepy time times   Quiet routine before sleep time Calming teas  sensory deficits  has macular  degeneration  pain denies now  Medication refills done and reconciliation  done  Goals of care discussed and completed

## 2024-05-16 ENCOUNTER — NON-APPOINTMENT (OUTPATIENT)
Age: 89
End: 2024-05-16

## 2024-05-17 RX ORDER — TRAMADOL HYDROCHLORIDE 50 MG/1
50 TABLET, COATED ORAL TWICE DAILY
Qty: 28 | Refills: 0 | Status: ACTIVE | COMMUNITY
Start: 2023-10-13 | End: 1900-01-01

## 2024-06-20 PROCEDURE — 97110 THERAPEUTIC EXERCISES: CPT | Mod: GO

## 2024-06-20 PROCEDURE — 97116 GAIT TRAINING THERAPY: CPT | Mod: GP

## 2024-06-20 PROCEDURE — 97165 OT EVAL LOW COMPLEX 30 MIN: CPT | Mod: GO

## 2024-06-20 PROCEDURE — 84443 ASSAY THYROID STIM HORMONE: CPT

## 2024-06-20 PROCEDURE — 87637 SARSCOV2&INF A&B&RSV AMP PRB: CPT

## 2024-06-20 PROCEDURE — 97530 THERAPEUTIC ACTIVITIES: CPT | Mod: GP

## 2024-06-20 PROCEDURE — 0225U NFCT DS DNA&RNA 21 SARSCOV2: CPT

## 2024-06-20 PROCEDURE — 97535 SELF CARE MNGMENT TRAINING: CPT | Mod: GO

## 2024-06-20 PROCEDURE — 36415 COLL VENOUS BLD VENIPUNCTURE: CPT

## 2024-06-20 PROCEDURE — 80061 LIPID PANEL: CPT

## 2024-06-20 PROCEDURE — 80048 BASIC METABOLIC PNL TOTAL CA: CPT

## 2024-06-20 PROCEDURE — 73060 X-RAY EXAM OF HUMERUS: CPT

## 2024-06-20 PROCEDURE — 85025 COMPLETE CBC W/AUTO DIFF WBC: CPT

## 2024-06-20 PROCEDURE — 97161 PT EVAL LOW COMPLEX 20 MIN: CPT | Mod: GP

## 2024-06-20 PROCEDURE — 82248 BILIRUBIN DIRECT: CPT

## 2024-06-20 PROCEDURE — 76700 US EXAM ABDOM COMPLETE: CPT

## 2024-06-20 PROCEDURE — 82977 ASSAY OF GGT: CPT

## 2024-06-20 PROCEDURE — 73030 X-RAY EXAM OF SHOULDER: CPT

## 2024-06-20 PROCEDURE — 83970 ASSAY OF PARATHORMONE: CPT

## 2024-06-20 PROCEDURE — 82306 VITAMIN D 25 HYDROXY: CPT

## 2024-06-20 PROCEDURE — 80074 ACUTE HEPATITIS PANEL: CPT

## 2024-06-20 PROCEDURE — 80053 COMPREHEN METABOLIC PANEL: CPT

## 2024-06-20 PROCEDURE — 82310 ASSAY OF CALCIUM: CPT

## 2024-06-20 PROCEDURE — 82652 VIT D 1 25-DIHYDROXY: CPT

## 2024-06-20 PROCEDURE — 97112 NEUROMUSCULAR REEDUCATION: CPT | Mod: GP

## 2024-06-26 ENCOUNTER — NON-APPOINTMENT (OUTPATIENT)
Age: 89
End: 2024-06-26

## 2024-07-17 ENCOUNTER — NON-APPOINTMENT (OUTPATIENT)
Age: 89
End: 2024-07-17

## 2024-07-22 DIAGNOSIS — N17.9 ACUTE KIDNEY FAILURE, UNSPECIFIED: ICD-10-CM

## 2024-08-07 NOTE — ED PROVIDER NOTE - PRINCIPAL DIAGNOSIS
Case Management Discharge Note      Final Note: SNF ([facility])    Provided Post Acute Provider List?: N/A  Provided Post Acute Provider Quality & Resource List?: N/A        Selected Continued Care - Discharged on 8/6/2024 Admission date: 7/31/2024 - Discharge disposition: Skilled Nursing Facility (DC - External)      Destination Coordination complete.      Service Provider Selected Services Address Phone Fax Patient Preferred    [facility] [address] [place] [address] [phone] --                         Transportation Services  W/C Van: Rodrigo Hammonds    Final Discharge Disposition Code: 03 - skilled nursing facility (SNF)   Chest pain due to CAD Tumor Depth: Less than 6mm from granular layer and no invasion beyond the subcutaneous fat

## 2024-12-22 NOTE — H&P PST ADULT - REASON FOR ADMISSION
gallbladder removal You can access the FollowMyHealth Patient Portal offered by [facility] by registering at the following website: [email or web address]. By joining [facility]’s FollowMyHealth portal, you will also be able to view your health information using other applications (apps) compatible with our system.

## 2025-01-24 ENCOUNTER — RX RENEWAL (OUTPATIENT)
Age: 89
End: 2025-01-24

## 2025-02-07 ENCOUNTER — NON-APPOINTMENT (OUTPATIENT)
Age: 89
End: 2025-02-07

## 2025-02-12 ENCOUNTER — APPOINTMENT (OUTPATIENT)
Dept: HOME HEALTH SERVICES | Facility: HOME HEALTH | Age: 89
End: 2025-02-12
Payer: MEDICARE

## 2025-02-12 VITALS
SYSTOLIC BLOOD PRESSURE: 116 MMHG | HEART RATE: 66 BPM | TEMPERATURE: 98.1 F | OXYGEN SATURATION: 98 % | DIASTOLIC BLOOD PRESSURE: 70 MMHG

## 2025-02-12 DIAGNOSIS — S32.010A WEDGE COMPRESSION FRACTURE OF T11-T12 VERTEBRA, INITIAL ENCOUNTER FOR CLOSED FRACTURE: ICD-10-CM

## 2025-02-12 DIAGNOSIS — N17.9 ACUTE KIDNEY FAILURE, UNSPECIFIED: ICD-10-CM

## 2025-02-12 DIAGNOSIS — S22.080A WEDGE COMPRESSION FRACTURE OF T11-T12 VERTEBRA, INITIAL ENCOUNTER FOR CLOSED FRACTURE: ICD-10-CM

## 2025-02-12 DIAGNOSIS — I70.0 ATHEROSCLEROSIS OF AORTA: ICD-10-CM

## 2025-02-12 DIAGNOSIS — R26.81 UNSTEADINESS ON FEET: ICD-10-CM

## 2025-02-12 PROCEDURE — 99349 HOME/RES VST EST MOD MDM 40: CPT

## 2025-03-09 NOTE — DIETITIAN INITIAL EVALUATION ADULT - OTHER INFO
admitted for abdominal pain found to have abd wall seroma drain placed by IR
Initial Nutrition Assessment   89yr Old Female   Denies Food Allergy/Intolerance  Tolerates Diet Well - No Chewing/Swallowing Complications (Per Patient)  No Pressure Ulcers (as Per Nursing Flow Sheets)  No Edema Noted (as Per Nursing Flow Sheets)  No Recent Nausea/Vomiting/Diarrhea & Some Recent Constipation (as Per Patient)

## 2025-03-27 ENCOUNTER — TRANSCRIPTION ENCOUNTER (OUTPATIENT)
Age: 89
End: 2025-03-27

## 2025-07-31 NOTE — ED ADULT TRIAGE NOTE - HEIGHT IN FEET
Subjective   Patient ID: Carmen Perez is a 58 y.o. female. They present today with a chief complaint of Diarrhea and Abdominal Pain.    History of Present Illness  Carmen is a pleasant 58-year-old female who presents to the urgent care for evaluation of suspected food poisoning that occurred on Tuesday after eating a [employer] fish sandwich.  Patient states she has vague abdominal discomfort and had several episodes of diarrhea since Tuesday.  Patient states she missed work yesterday and today due to not feeling well.  Patient states initially on Wednesday at the time of worse symptoms felt dizzy when she got up and went and had diarrhea.  Patient denies vomiting or bloody stools.  Patient states intermittent dizziness has since resolved.  Patient denies numbness or weakness or visual disturbances.  Patient seeking evaluation reassurance and work excuse.  No other symptoms or concerns otherwise reported.    Past Medical History  Allergies as of 07/31/2025    (No Known Allergies)       Prescriptions Prior to Admission[1]     Medical History[2]    Surgical History[3]     reports that she has never smoked. She has never used smokeless tobacco. She reports current alcohol use. She reports that she does not use drugs.    Review of Systems  A 10-point review of systems was performed, otherwise unremarkable unless stated in the history of present illness.              Objective    Vitals:    07/31/25 1524   BP: 166/89   Pulse: 79   Resp: 20   Temp: 36.3 °C (97.3 °F)   TempSrc: Oral   SpO2: 99%     No LMP recorded. Patient is postmenopausal.    Gen: Vitals noted and reviewed, no evidence of acute distress, well developed and afebrile.   Psych: Mood and affect appropriate for setting.  Head/Face: Atraumatic and normocephalic.   Neuro: No focal deficits noted.  Cardiac: Regular rate and rhythm no murmur.   Lungs: Clear to auscultation throughout, No evidence of wheezing, rhonchi or crackles. Good aeration throughout.    Abdomen: Soft, non-tender throughout. Normoactive bowel sounds. No evidence of palpable masses, No CVA tenderness noted b/l.    (FEMALE): Deferred by the patient     Extremities: Symmetrical, No peripheral edema  Skin: Without evidence of ecchymosis, wounds, or rashes.      Point of Care Test & Imaging Results from this visit  Results for orders placed or performed in visit on 07/31/25   POCT pregnancy, urine manually resulted   Result Value Ref Range    Preg Test, Ur Negative Negative   POCT UA Automated manually resulted   Result Value Ref Range    POC Color, Urine Yellow Straw, Yellow, Light-Yellow    POC Appearance, Urine Hazy (A) Clear    POC Glucose, Urine NEGATIVE NEGATIVE mg/dl    POC Bilirubin, Urine NEGATIVE NEGATIVE    POC Ketones, Urine NEGATIVE NEGATIVE mg/dl    POC Specific Gravity, Urine 1.020 1.005 - 1.035    POC Blood, Urine SMALL (1+) (A) NEGATIVE    POC PH, Urine 6.0 No Reference Range Established PH    POC Protein, Urine NEGATIVE NEGATIVE mg/dl    POC Urobilinogen, Urine 0.2 0.2, 1.0 EU/DL    Poc Nitrite, Urine NEGATIVE NEGATIVE    POC Leukocytes, Urine NEGATIVE NEGATIVE      Imaging  No results found.    Cardiology, Vascular, and Other Imaging  No other imaging results found for the past 2 days      Diagnostic study results (if any) were reviewed by Julian Maier DO.    Assessment/Plan   Allergies, medications, history, and pertinent labs/EKGs/Imaging reviewed by Julian Maier DO.     Medical Decision Making  Discussed with the patient symptoms and clinical presentation findings suggestive of viral gastroenteritis versus food poisoning of unclear etiology in the setting of hematuria of unclear etiology that may or may not be related to this.  We advise close symptom monitoring supportive treatment use of over-the-counter remedies to aid in symptom management.  We reviewed red flag symptoms of dizziness and advise calling 911 or go to the ER if this develops or persist.  In the meantime we  sent urine out for culture given hematuria. Adhere to a BRAT diet. Follow up with Primary Care Provider. We advised seeking immediate emergency medical attention if symptoms fail to improve, worsen or any concerning symptoms arise. Patient voiced full understanding and agreement to plan.    We discussed with the patient our clinical thoughts at this time given the above findings and clinical assessment and we had a shared decision-making conversation in a patient-centered decision-making model on how to proceed forward. The patient was instructed on the importance of a close follow-up with Primary Care Provider and other care providers. The patient was also advised that an Urgent care diagnosis is often a preliminary impression and that definitive care is often not able to be given completely in the Urgent care setting.      Orders and Diagnoses  Diagnoses and all orders for this visit:  Stomach ache  -     POCT pregnancy, urine manually resulted  -     POCT UA Automated manually resulted  Food poisoning  Hematuria, unspecified type  -     Urine Culture      Medical Admin Record      Patient disposition: Home    Electronically signed by Julian Maier DO  3:40 PM           [1] (Not in a hospital admission)   [2]   Past Medical History:  Diagnosis Date    Allergic rhinitis, unspecified 07/23/2013    Allergic rhinitis    Carpal tunnel syndrome, unspecified upper limb 07/23/2013    Carpal tunnel syndrome   [3]   Past Surgical History:  Procedure Laterality Date    CARPAL TUNNEL RELEASE  10/06/2014    Neuroplasty Decompression Median Nerve At Carpal Tunnel    OTHER SURGICAL HISTORY  10/06/2014    Urethra Surgery      5

## 2025-08-07 ENCOUNTER — APPOINTMENT (OUTPATIENT)
Dept: HOME HEALTH SERVICES | Facility: HOME HEALTH | Age: 89
End: 2025-08-07
Payer: MEDICARE

## 2025-08-07 VITALS
SYSTOLIC BLOOD PRESSURE: 120 MMHG | OXYGEN SATURATION: 98 % | DIASTOLIC BLOOD PRESSURE: 70 MMHG | HEART RATE: 66 BPM | TEMPERATURE: 97 F

## 2025-08-07 DIAGNOSIS — N17.9 ACUTE KIDNEY FAILURE, UNSPECIFIED: ICD-10-CM

## 2025-08-07 DIAGNOSIS — S22.080A WEDGE COMPRESSION FRACTURE OF T11-T12 VERTEBRA, INITIAL ENCOUNTER FOR CLOSED FRACTURE: ICD-10-CM

## 2025-08-07 DIAGNOSIS — S32.010A WEDGE COMPRESSION FRACTURE OF T11-T12 VERTEBRA, INITIAL ENCOUNTER FOR CLOSED FRACTURE: ICD-10-CM

## 2025-08-07 DIAGNOSIS — Z71.89 OTHER SPECIFIED COUNSELING: ICD-10-CM

## 2025-08-07 DIAGNOSIS — I70.0 ATHEROSCLEROSIS OF AORTA: ICD-10-CM

## 2025-08-07 PROCEDURE — 99497 ADVNCD CARE PLAN 30 MIN: CPT

## 2025-08-07 PROCEDURE — 99348 HOME/RES VST EST LOW MDM 30: CPT | Mod: 25
